# Patient Record
Sex: FEMALE | Race: WHITE | Employment: OTHER | URBAN - METROPOLITAN AREA
[De-identification: names, ages, dates, MRNs, and addresses within clinical notes are randomized per-mention and may not be internally consistent; named-entity substitution may affect disease eponyms.]

---

## 2017-03-26 PROBLEM — M19.011 PRIMARY OSTEOARTHRITIS OF RIGHT SHOULDER: Status: ACTIVE | Noted: 2017-03-26

## 2018-08-22 ENCOUNTER — TELEPHONE (OUTPATIENT)
Dept: ADMINISTRATIVE | Age: 78
End: 2018-08-22

## 2018-09-24 ENCOUNTER — HOSPITAL ENCOUNTER (OUTPATIENT)
Age: 78
Discharge: HOME | End: 2018-09-26
Payer: MEDICARE

## 2018-09-24 ENCOUNTER — OFFICE VISIT (OUTPATIENT)
Dept: FAMILY MEDICINE CLINIC | Age: 78
End: 2018-09-24
Payer: MEDICARE

## 2018-09-24 VITALS
HEIGHT: 66 IN | BODY MASS INDEX: 21.05 KG/M2 | SYSTOLIC BLOOD PRESSURE: 136 MMHG | RESPIRATION RATE: 20 BRPM | WEIGHT: 131 LBS | OXYGEN SATURATION: 99 % | DIASTOLIC BLOOD PRESSURE: 84 MMHG | HEART RATE: 74 BPM

## 2018-09-24 DIAGNOSIS — R20.2 LEFT LEG PARESTHESIAS: ICD-10-CM

## 2018-09-24 DIAGNOSIS — E55.9 VITAMIN D DEFICIENCY: ICD-10-CM

## 2018-09-24 DIAGNOSIS — R20.2 LEFT LEG PARESTHESIAS: Primary | ICD-10-CM

## 2018-09-24 DIAGNOSIS — Z23 NEED FOR IMMUNIZATION AGAINST INFLUENZA: ICD-10-CM

## 2018-09-24 LAB
FOLATE: 8.1 NG/ML (ref 4.8–24.2)
TSH SERPL DL<=0.05 MIU/L-ACNC: 4.24 UIU/ML (ref 0.27–4.2)
VITAMIN B-12: 339 PG/ML (ref 211–946)
VITAMIN D 25-HYDROXY: 46 NG/ML (ref 30–100)

## 2018-09-24 PROCEDURE — G0008 ADMIN INFLUENZA VIRUS VAC: HCPCS | Performed by: FAMILY MEDICINE

## 2018-09-24 PROCEDURE — 1123F ACP DISCUSS/DSCN MKR DOCD: CPT | Performed by: FAMILY MEDICINE

## 2018-09-24 PROCEDURE — 1101F PT FALLS ASSESS-DOCD LE1/YR: CPT | Performed by: FAMILY MEDICINE

## 2018-09-24 PROCEDURE — 82607 VITAMIN B-12: CPT

## 2018-09-24 PROCEDURE — 1036F TOBACCO NON-USER: CPT | Performed by: FAMILY MEDICINE

## 2018-09-24 PROCEDURE — 99213 OFFICE O/P EST LOW 20 MIN: CPT | Performed by: FAMILY MEDICINE

## 2018-09-24 PROCEDURE — 84443 ASSAY THYROID STIM HORMONE: CPT

## 2018-09-24 PROCEDURE — G8420 CALC BMI NORM PARAMETERS: HCPCS | Performed by: FAMILY MEDICINE

## 2018-09-24 PROCEDURE — 82746 ASSAY OF FOLIC ACID SERUM: CPT

## 2018-09-24 PROCEDURE — 4040F PNEUMOC VAC/ADMIN/RCVD: CPT | Performed by: FAMILY MEDICINE

## 2018-09-24 PROCEDURE — G8427 DOCREV CUR MEDS BY ELIG CLIN: HCPCS | Performed by: FAMILY MEDICINE

## 2018-09-24 PROCEDURE — 90662 IIV NO PRSV INCREASED AG IM: CPT | Performed by: FAMILY MEDICINE

## 2018-09-24 PROCEDURE — 82306 VITAMIN D 25 HYDROXY: CPT

## 2018-09-24 PROCEDURE — G8399 PT W/DXA RESULTS DOCUMENT: HCPCS | Performed by: FAMILY MEDICINE

## 2018-09-24 PROCEDURE — 1090F PRES/ABSN URINE INCON ASSESS: CPT | Performed by: FAMILY MEDICINE

## 2018-09-24 RX ORDER — GABAPENTIN 100 MG/1
100 CAPSULE ORAL 3 TIMES DAILY
Qty: 270 CAPSULE | Refills: 1 | Status: SHIPPED | OUTPATIENT
Start: 2018-09-24 | End: 2018-10-02 | Stop reason: SDUPTHER

## 2018-09-24 ASSESSMENT — ENCOUNTER SYMPTOMS
NAUSEA: 0
SHORTNESS OF BREATH: 0
VOMITING: 0
DIARRHEA: 0

## 2018-09-24 ASSESSMENT — PATIENT HEALTH QUESTIONNAIRE - PHQ9
2. FEELING DOWN, DEPRESSED OR HOPELESS: 0
SUM OF ALL RESPONSES TO PHQ QUESTIONS 1-9: 0
1. LITTLE INTEREST OR PLEASURE IN DOING THINGS: 0
SUM OF ALL RESPONSES TO PHQ9 QUESTIONS 1 & 2: 0
SUM OF ALL RESPONSES TO PHQ QUESTIONS 1-9: 0

## 2018-09-24 NOTE — PATIENT INSTRUCTIONS
Patient Education        Neuropathic Pain: Care Instructions  Your Care Instructions    Neuropathic pain is caused by pressure on or damage to your nerves. It's often simply called nerve pain. Some people feel this type of pain all the time. For others, it comes and goes. Diabetes, shingles, or an injury can cause nerve pain. Many people say the pain feels sharp, burning, or stabbing. But some people feel it as a dull ache. In some cases, it makes your skin very sensitive. So touch, pressure, and other sensations that did not hurt before may now cause pain. It's important to know that this kind of pain is real and can affect your quality of life. It's also important to know that treatment can help. Treatment includes pain medicines, exercise, and physical therapy. Medicines can help reduce the number of pain signals that travel over the nerves. This can make the painful areas less sensitive. It can also help you sleep better and improve your mood. But medicines are only one part of successful treatment. Most people do best with more than one kind of treatment. Your doctor may recommend that you try cognitive-behavioral therapy and stress management. Or, if needed, you may decide to try to quit smoking, lower your blood pressure, or better control blood sugar. These kinds of healthy changes can also make a difference. If you feel that your treatment is not working, talk to your doctor. And be sure to tell your doctor if you think you might be depressed or anxious. These are common problems that can also be treated. Follow-up care is a key part of your treatment and safety. Be sure to make and go to all appointments, and call your doctor if you are having problems. It's also a good idea to know your test results and keep a list of the medicines you take. How can you care for yourself at home? · Be safe with medicines. Read and follow all instructions on the label.   ¨ If the doctor gave you a prescription medicine for pain, take it as prescribed. ¨ If you are not taking a prescription pain medicine, ask your doctor if you can take an over-the-counter medicine. · Save hard tasks for days when you have less pain. Follow a hard task with an easy task. And remember to take breaks. · Relax, and reduce stress. You may want to try deep breathing or meditation. These can help. · Keep moving. Gentle, daily exercise can help reduce pain. Your doctor or physical therapist can tell you what type of exercise is best for you. This may include walking, swimming, and stationary biking. It may also include stretches and range-of-motion exercises. · Try heat, cold packs, and massage. · Get enough sleep. Constant pain can make you more tired. If the pain makes it hard to sleep, talk with your doctor. · Think positively. Your thoughts can affect your pain. Do fun things to distract yourself from the pain. See a movie, read a book, listen to music, or spend time with a friend. · Keep a pain diary. Try to write down how strong your pain is and what it feels like. Also try to notice and write down how your moods, thoughts, sleep, activities, and medicine affect your pain. These notes can help you and your doctor find the best ways to treat your pain. Reducing constipation caused by pain medicine  Pain medicines often cause constipation. To reduce constipation:  · Include fruits, vegetables, beans, and whole grains in your diet each day. These foods are high in fiber. · Drink plenty of fluids, enough so that your urine is light yellow or clear like water. If you have kidney, heart, or liver disease and have to limit fluids, talk with your doctor before you increase the amount of fluids you drink. · Get some exercise every day. Build up slowly to 30 to 60 minutes a day on 5 or more days of the week. · Take a fiber supplement, such as Citrucel or Metamucil, every day if needed.  Read and follow all instructions on the

## 2018-09-28 ENCOUNTER — TELEPHONE (OUTPATIENT)
Dept: FAMILY MEDICINE CLINIC | Age: 78
End: 2018-09-28

## 2018-09-28 NOTE — TELEPHONE ENCOUNTER
Patient notified of lab results and change to medication. She was instructed to stop the 50,000 units weekly and to start taking the 100 units daily. Patient had a question regarding the gabapentin prescription that was sent in. She states it was $109 and she felt that was too much for the medication.  Is there anything else you can send in?

## 2018-10-02 DIAGNOSIS — R20.2 LEFT LEG PARESTHESIAS: ICD-10-CM

## 2018-10-03 RX ORDER — GABAPENTIN 100 MG/1
100 CAPSULE ORAL 3 TIMES DAILY
Qty: 90 CAPSULE | Refills: 3 | Status: SHIPPED | OUTPATIENT
Start: 2018-10-03 | End: 2019-02-05 | Stop reason: DRUGHIGH

## 2018-10-04 ENCOUNTER — TELEPHONE (OUTPATIENT)
Dept: FAMILY MEDICINE CLINIC | Age: 78
End: 2018-10-04

## 2018-10-17 ENCOUNTER — OFFICE VISIT (OUTPATIENT)
Dept: PHYSICAL MEDICINE AND REHAB | Age: 78
End: 2018-10-17
Payer: MEDICARE

## 2018-10-17 VITALS — HEIGHT: 64 IN | BODY MASS INDEX: 23.22 KG/M2 | WEIGHT: 136 LBS

## 2018-10-17 DIAGNOSIS — M54.17 RADICULOPATHY, LUMBOSACRAL REGION: Primary | ICD-10-CM

## 2018-10-17 PROCEDURE — 95886 MUSC TEST DONE W/N TEST COMP: CPT | Performed by: PHYSICAL MEDICINE & REHABILITATION

## 2018-10-17 PROCEDURE — 95909 NRV CNDJ TST 5-6 STUDIES: CPT | Performed by: PHYSICAL MEDICINE & REHABILITATION

## 2018-10-17 PROCEDURE — 99202 OFFICE O/P NEW SF 15 MIN: CPT | Performed by: PHYSICAL MEDICINE & REHABILITATION

## 2018-10-17 PROCEDURE — 1090F PRES/ABSN URINE INCON ASSESS: CPT | Performed by: PHYSICAL MEDICINE & REHABILITATION

## 2018-10-17 PROCEDURE — G8427 DOCREV CUR MEDS BY ELIG CLIN: HCPCS | Performed by: PHYSICAL MEDICINE & REHABILITATION

## 2018-10-17 PROCEDURE — 1123F ACP DISCUSS/DSCN MKR DOCD: CPT | Performed by: PHYSICAL MEDICINE & REHABILITATION

## 2018-10-17 PROCEDURE — G8420 CALC BMI NORM PARAMETERS: HCPCS | Performed by: PHYSICAL MEDICINE & REHABILITATION

## 2018-10-17 PROCEDURE — 1101F PT FALLS ASSESS-DOCD LE1/YR: CPT | Performed by: PHYSICAL MEDICINE & REHABILITATION

## 2018-10-17 PROCEDURE — G8399 PT W/DXA RESULTS DOCUMENT: HCPCS | Performed by: PHYSICAL MEDICINE & REHABILITATION

## 2018-10-17 PROCEDURE — G8482 FLU IMMUNIZE ORDER/ADMIN: HCPCS | Performed by: PHYSICAL MEDICINE & REHABILITATION

## 2018-10-17 PROCEDURE — 4040F PNEUMOC VAC/ADMIN/RCVD: CPT | Performed by: PHYSICAL MEDICINE & REHABILITATION

## 2018-10-17 PROCEDURE — 1036F TOBACCO NON-USER: CPT | Performed by: PHYSICAL MEDICINE & REHABILITATION

## 2018-10-17 NOTE — PATIENT INSTRUCTIONS
Electrodiagnotic Laboratory  Accredited by the [facility] with Exemplary status  [facility]  [address] [place]  Phone: [phone]  Fax: [phone]        Today you had an electrodiagnostic exam which included nerve conduction studies (NCS) and electromyography (EMG). This test evaluated the electrical activity of your nerves and muscles to help determine if you have a nerve or muscle disease. This test can help determine the location and type of a nerve or muscle problem. This will help your referring doctor diagnose your condition and determine the appropriate next step in your treatment plan. After your test:    1. There are no long lasting side effects of the test.     2. You may resume your normal activities without restrictions. 3.  Resume any medications that were stopped for the test.     4  If you have sore areas or bruising in your muscles where the needle was placed, apply a cold pack to the sore area for 15-20 minutes three to four times a day as needed for pain. The soreness should go away in about 1-2 days. 5. Your results were provided  Briefly at the end of your test and the final detailed report will be provided to your referring physician, and/or primary care physician and any other parties you requested within 1-2 days of the examination. You may wish to contact your referring provider after a few days to determine what they would like you to do next. 6.  Please call [phone] with any questions or concerns and if you develop increased body temperature/fever, swelling, tenderness, increased pain and/or drainage from the sites where the needle was placed. Thank you for choosing us for your health care needs.

## 2018-10-17 NOTE — PROGRESS NOTES
lumbrical/interossei)  0.4     Combined Sensory Index:   Study Latency Difference (ms)</=   Median to Ulnar Palmar Orthodromic mixed nerve comparison 0.3   Median to Ulnar sensory Ring finger comparison 0.4       Median: Radial sensory digit 1 comparison 0.5     Combined Sensory Index (CSI)  0.9       Motor Nerves Baseline to Peak Amplitude  (mV) Conduction Velocity (m/s) Distal Latency (ms)   Median motor APB  All ages  Men Age23 to 44  Men Age 36 to 52  Men Age 48 to 61  Men Age 61 to 71  Men age 79 to 78  Women Age 23 to 44  Women Age 36 to 52  Women Age 48 to 61  Women Age 61 to 71  Women Age 79 to 78   4.1  5.9  4.2   4.2   3.8  3.8  5.9  4.2  4.2  3.8  3.8   49  49  47  47  47  47  53  51  51  51  51   4.5  4.6  4.6  4.7  4.7  4.7  4.4  4.4  4.4  4.4  4.4   Ulnar motor ADM  All ages  Below elbow  Across elbow  Above elbow  CV drop across elbow  % CV drop across elbow   7.9     52  43  50  15 m/s  23%   3.7   Fibular (peroneal) motor EDB  All ages  Age 23 to 44 <170 cm tall   Age 23 to 44 >170 cm tall  Age 36 to 78 <170 cm tall  Age 36 to 78 >170 cm tall  CV across fibular head  CV drop across fibular head  % CV drop across fibular head  % amplitude drop ankle to below fibula  % amplitude drop across fibular head   1.3  2.6  2.6  1.1  1.1        32%  25%   38  43  37  39  36  42  6m/s  12%     6.5  6.5  6.5  6.5  6.5   Tibial motor AH  All ages  Age 23 to 34 <160 cm tall  Age 30-49 <160 cm tall  Age 48 to 61 <160 cm tall  Age 61 to 78 <160 cm tall  Age 23 to 34, 160-170 cm tall  Age 30-49 160-170 cm tall  Age 48 to 61 160-170 cm tall  Age 61 to 78 160-170 cm tall  Age 23 to 34 >/=170 cm tall  Age 30-49 >/=170 cm tall  Age 48 to 61 >/=170 cm tall  Age 61 to 78 >/=170 cm tall  Amplitude drop from ankle to knee  % amplitude drop ankle to knee   4.4  5.8  5.3  5.3  1.1  5.8  5.3  5.3  1.1  5.8  5.3  5.3  1.1  10.3  71%   39  44  44  40  40  42  42  34  34  37  37  34  34 6.1  6.1  6.1  6.1  6.1  6.1  6.1  6.1  6.1  6.1  6.1  6.1  6.1     Ulnar motor (FDI)  Age <9  Age 7-34  Age 35-46  Age 52-63  Age >57   >8  >8  >7  >7  >7   >51  >51  >50  >50  >50   <3.8  <3.8  <4.3  <4.5  <4.5     Radial motor (EDC)  Age <9  Age 7-34  Age 35-46  Age 52-63  Age >57   >6  >6  >6  >5  >5   >47  >47  >50  >50  >50   <3.0  <3.0  <3.1  <3.1  <3.1   Musculocutaneous motor (Biceps)   Age <9  Age 7-34  Age 35-46  Age 52-63  Age >57   >4  >4  >4  >4  >3    <3.5  <3.5  <3.5  <3.5  <3.8   Axillary motor (Deltoid)  Age <9  Age 7-34  Age 35-46  Age 52-63  Age >57   >4  >4  >4  >4  >3    <4.8  <4.8  <4.8  <4.8  <5     Tibial motor (ADQP)  Age <9  Age 7-34  Age 35-46  Age 52-63  Age >57   >4  >4  >4  >3  >3   >41  >41  >41  >40  >40   <6.0  <6.0  <6.5  <6.5  <6.5   Peroneal motor (TA)  Age <9  Age 7-32>4  Age 35-46  Age 52-63  Age >57   >4  >4  >4  >3  >3   >41  >41  >40  >40  >40   <4  <4  <4  <4.5  <4.5     Femoral motor (RF)  Age <9  Age 7-34  Age 35-46  Age 52-63  Age >57   >4  >4  >4  >3  >3      <6.0  <6.0  <6.5  <6.5  <6.5         Nerve F  Minimal latency (ms)   Median (APB) 32   Ulnar (ADM)  32   Peroneal motor (EDB) <56   Tibial motor (AH) <56     Nerve  H-reflex latency (ms) H reflex- side to side latency  difference (ms)   Tibial  (gatroc-soleus) <34  <1.5       Needle EMG:  Fibrillations, positive sharp waves and fasciculations are graded from none (0) to continuous (4+). The configuration and recruitment pattern of motor unit action potentials under voluntary control are described below.      Cc: Irma Spicer MD

## 2018-10-18 ENCOUNTER — TELEPHONE (OUTPATIENT)
Dept: ADMINISTRATIVE | Age: 78
End: 2018-10-18

## 2018-10-18 NOTE — TELEPHONE ENCOUNTER
Pt's  called to cancel and re schedule pt's Medicare AWV. Pre Service was unable to find an appt  With time frame that pt wanted,  was told office will be contacted, and he will receive a call from office to help schedule an appt. Call was received from [phone].

## 2018-10-18 NOTE — PROGRESS NOTES
Date of Examination: 10/18/18  Patient Name: Omar Varghese  is a 69y.o. year old female who was seen due to complaints of   Chief Complaint   Patient presents with    Leg Pain     Left lower extremity \"burning pain\"     Numbness     Left lower extremity numbness     Extremity Weakness     Left lower extremity weakness    that has been present for several months and started after no injury. Physical Exam: MSK: There is no joint effusion, deformity, instability, swelling, erythema or warmth. AROM is full in the spine and extremities. + SLR left. Neurologic:  No focal sensorimotor deficit. Reflexes 2+ and symmetric. Gait is normal.    Impression:     1. Radiculopathy, lumbosacral region        Plan:   · EMG is indicated to evaluate the above diagnosis. Orders Placed This Encounter   Procedures    FL NEEDLE EMG EA EXTREMTY W/PARASPINL AREA COMPLETE    FL MOTOR &/SENS 5-6 NRV CNDJ PRECONF ELTRODE LIMB     · EMG was done today and showed L5 radiculopathy. The patient was educated about the diagnosis and the prognosis. · Recommend correlation with lumbar MRI to evaluate for cause of radiculopathy  · Advised patient to follow up with referring provider. Thank you for allowing me to participate in the care of your patient.       Sincerely,     Nadine Franklyn

## 2018-10-23 DIAGNOSIS — R20.2 LEFT LEG PARESTHESIAS: ICD-10-CM

## 2018-10-25 ENCOUNTER — OFFICE VISIT (OUTPATIENT)
Dept: FAMILY MEDICINE CLINIC | Age: 78
End: 2018-10-25
Payer: MEDICARE

## 2018-10-25 VITALS
WEIGHT: 133 LBS | HEART RATE: 76 BPM | OXYGEN SATURATION: 95 % | SYSTOLIC BLOOD PRESSURE: 122 MMHG | BODY MASS INDEX: 22.16 KG/M2 | RESPIRATION RATE: 20 BRPM | HEIGHT: 65 IN | DIASTOLIC BLOOD PRESSURE: 64 MMHG

## 2018-10-25 DIAGNOSIS — Z00.00 ROUTINE GENERAL MEDICAL EXAMINATION AT A HEALTH CARE FACILITY: Primary | ICD-10-CM

## 2018-10-25 DIAGNOSIS — Z12.31 ENCOUNTER FOR SCREENING MAMMOGRAM FOR BREAST CANCER: ICD-10-CM

## 2018-10-25 PROCEDURE — G0438 PPPS, INITIAL VISIT: HCPCS | Performed by: FAMILY MEDICINE

## 2018-10-25 PROCEDURE — 4040F PNEUMOC VAC/ADMIN/RCVD: CPT | Performed by: FAMILY MEDICINE

## 2018-10-25 PROCEDURE — G8482 FLU IMMUNIZE ORDER/ADMIN: HCPCS | Performed by: FAMILY MEDICINE

## 2018-10-25 ASSESSMENT — PATIENT HEALTH QUESTIONNAIRE - PHQ9
SUM OF ALL RESPONSES TO PHQ QUESTIONS 1-9: 1
SUM OF ALL RESPONSES TO PHQ QUESTIONS 1-9: 1

## 2018-10-25 NOTE — PROGRESS NOTES
Medicare Annual Wellness Visit  Name: August Puga Date: 10/25/2018   MRN: <[id number]> Sex: Female   Age: 69 y.o. Ethnicity: Non-/Non    : 1940 Race: Mayra Guido is here for Medicare AWV    Screenings for behavioral, psychosocial and functional/safety risks, and cognitive dysfunction are all negative except as indicated below. These results, as well as other patient data from the [address] form, are documented in Flowsheets linked to this Encounter. Allergies   Allergen Reactions    Penicillins Anaphylaxis and Swelling    Dextromethorphan Hives    Factive [Gemifloxacin Mesylate]     Food Hives     shrimp    Mucinex Dm [Dm-Guaifenesin Er] Hives    Other      THORPHAN       Prior to Visit Medications    Medication Sig Taking? Authorizing Provider   gabapentin (NEURONTIN) 100 MG capsule Take 1 capsule by mouth 3 times daily. Yesenia Pappas MD   vitamin D (CHOLECALCIFEROL) 1000 UNIT TABS tablet Take 1,000 Units by mouth daily Yes Historical Provider, MD   diazepam (VALIUM) 5 MG tablet Take 1 tablet by mouth nightly as needed for Sleep Yes Danial Pappas MD   primidone (MYSOLINE) 250 MG tablet Take 250 mg by mouth 3 times daily. Yes Historical Provider, MD   lamotrigine (LAMICTAL) 100 MG tablet Take 100 mg by mouth 3 times daily. Yes Historical Provider, MD       Past Medical History:   Diagnosis Date    Diverticulosis     Hemorrhoids, internal     Irritable bowel syndrome     Osteoarthritis     rt knee    Osteoporosis     Seizures ([address]     complex partial  last seizure ?     Urticaria, chronic      Past Surgical History:   Procedure Laterality Date    APPENDECTOMY      CARPAL TUNNEL RELEASE Left     CHOLECYSTECTOMY      COLONOSCOPY      FINGER TRIGGER RELEASE Left     HERNIA REPAIR Right 10/31/2014    Laparoscopic right inguinal hernia repair with mesh     HYSTERECTOMY      vaginal    KNEE ARTHROSCOPY right    TONSILLECTOMY AND ADENOIDECTOMY         Family History   Problem Relation Age of Onset    Coronary Art Dis Mother     Coronary Art Dis Father     Coronary Art Dis Brother     Cancer Brother         lymph nodes and lung    Diabetes Maternal Aunt        CareTeam (Including outside providers/suppliers regularly involved in providing care):   Patient Care Team:  Maria Isabel Gee MD as PCP - General  Maria Isabel Gee MD as PCP - S Attributed Provider  Karmen Mclaughlin MD as Referring Physician  Veronica Navarro MD as Consulting Physician (Neurology)  Ayesha Dakin, MD as Consulting Physician (Cardiology)    Wt Readings from Last 3 Encounters:   10/25/18 133 lb (60.3 kg)   10/17/18 136 lb (61.7 kg)   09/24/18 131 lb (59.4 kg)     Vitals:    10/25/18 1445   BP: 122/64   Pulse: 76   Resp: 20   SpO2: 95%   Weight: 133 lb (60.3 kg)   Height: 5' 5\" (1.651 m)     Body mass index is 22.13 kg/m². Physical Exam   Constitutional: She is oriented to person, place, and time. She appears well-developed and well-nourished. Cardiovascular: Normal rate, regular rhythm and normal heart sounds. Exam reveals no friction rub. No murmur heard. Pulmonary/Chest: Effort normal and breath sounds normal. No respiratory distress. She has no wheezes. She has no rales. Abdominal: Soft. Bowel sounds are normal. She exhibits no distension. There is no tenderness. There is no rebound and no guarding. Neurological: She is alert and oriented to person, place, and time. Skin: Skin is warm and dry. Vitals reviewed. Patient's complete Health Risk Assessment and screening values have been reviewed and are found in Flowsheets. The following problems were reviewed today and where indicated follow up appointments were made and/or referrals ordered.     Positive Risk Factor Screenings with Interventions:     General Health:  General  In the past 7 days, have you experienced any of the

## 2018-10-25 NOTE — PATIENT INSTRUCTIONS
cholesterol, this type of cholesterol actually carries cholesterol away from your arteries and may, therefore, help lower your risk of having a heart attack. You want this level to be high (ideally greater than 60). It is a risk to have a level less than 40. You can raise this good cholesterol by eating olive oil, canola oil, avocados, or nuts. Exercise raises this level, too. Fat    Fat is calorie dense and packs a lot of calories into a small amount of food. Even though fats should be limited due to their high calorie content, not all fats are bad. In fact, some fats are quite healthful. Fat can be broken down into four main types. The good-for-you fats are:   Monounsaturated fat  found in oils such as olive and canola, avocados, and nuts and natural nut butters; can decrease cholesterol levels, while keeping levels of HDL cholesterol high   Polyunsaturated fat  found in oils such as safflower, sunflower, soybean, corn, and sesame; can decrease total cholesterol and LDL cholesterol   Omega-3 fatty acids  particularly those found in fatty fish (such as salmon, trout, tuna, mackerel, herring, and sardines); can decrease risk of arrhythmias, decrease triglyceride levels, and slightly lower blood pressure   The fats that you want to limit are:   Saturated fat  found in animal products, many fast foods, and a few vegetables; increases total blood cholesterol, including LDL levels   Animal fats that are saturated include: butter, lard, whole-milk dairy products, meat fat, and poultry skin   Vegetable fats that are saturated include: hydrogenated shortening, palm oil, coconut oil, cocoa butter   Hydrogenated or trans fat  found in margarine and vegetable shortening, most shelf stable snack foods, and fried foods; increases LDL and decreases HDL     It is generally recommended that you limit your total fat for the day to less than 30% of your total calories.  If you follow an 1800-calorie heart healthy diet, for memory aids can help:   Calendars and day planners   Electronic organizers to store all sorts of helpful informationThese devices can \"beep\" to remind you of appointments. A book of days to record birthdays, anniversaries, and other occasions that occur on the same date every year   Detailed \"to-do\" lists and strategically placed sticky notes   Quick \"study\" sessionsBefore a gathering, review who will be there so their names will be fresh in your mind. Establish routinesFor example, keep your keys, wallet, and umbrella in the same place all the time or take medicine with your 8:00 AM glass of juice   Live a Healthy Life   Many actions that will keep your body strong will do the same for your mind. For example:   Talk to Your Doctor About Herbs and Supplements    Malnutrition and vitamin deficiencies can impair your mental function. For example, vitamin B12 deficiency can cause a range of symptoms, including confusion. But, what if your nutritional needs are being met? Can herbs and supplements still offer a benefit? Researchers have investigated a range of natural remedies, such as ginkgo , ginseng , and the supplement phosphatidylserine (PS). So far, though, the evidence is inconsistent as to whether these products can improve memory or thinking. If you are interested in taking herbs and supplements, talk to your doctor first because they may interact with other medicines that you are taking. Exercise Regularly    Among the many benefits of regular exercise are increased blood flow to the brain and decreased risk of certain diseases that can interfere with memory function. One study found that even moderate exercise has a beneficial effect. Examples of \"moderate\" exercise include:   Playing 18 holes of golf once a week, without a cart   Playing tennis twice a week   Walking one mile per day   Manage Stress    It can be tough to remember what is important when your mind is cluttered.  Make time for

## 2018-11-19 ENCOUNTER — HOSPITAL ENCOUNTER (OUTPATIENT)
Dept: MAMMOGRAPHY | Age: 78
Discharge: HOME | End: 2018-11-21
Payer: MEDICARE

## 2018-11-19 DIAGNOSIS — Z12.31 ENCOUNTER FOR SCREENING MAMMOGRAM FOR BREAST CANCER: ICD-10-CM

## 2018-11-19 PROCEDURE — 77067 SCR MAMMO BI INCL CAD: CPT

## 2019-01-24 ENCOUNTER — OFFICE VISIT (OUTPATIENT)
Dept: FAMILY MEDICINE CLINIC | Age: 79
End: 2019-01-24
Payer: MEDICARE

## 2019-01-24 VITALS
WEIGHT: 130 LBS | DIASTOLIC BLOOD PRESSURE: 80 MMHG | OXYGEN SATURATION: 98 % | HEIGHT: 66 IN | HEART RATE: 76 BPM | SYSTOLIC BLOOD PRESSURE: 122 MMHG | BODY MASS INDEX: 20.89 KG/M2 | RESPIRATION RATE: 20 BRPM

## 2019-01-24 DIAGNOSIS — M54.16 LUMBAR RADICULOPATHY: Primary | ICD-10-CM

## 2019-01-24 PROCEDURE — 99214 OFFICE O/P EST MOD 30 MIN: CPT | Performed by: FAMILY MEDICINE

## 2019-01-24 PROCEDURE — 1101F PT FALLS ASSESS-DOCD LE1/YR: CPT | Performed by: FAMILY MEDICINE

## 2019-01-24 PROCEDURE — G8427 DOCREV CUR MEDS BY ELIG CLIN: HCPCS | Performed by: FAMILY MEDICINE

## 2019-01-24 PROCEDURE — G8482 FLU IMMUNIZE ORDER/ADMIN: HCPCS | Performed by: FAMILY MEDICINE

## 2019-01-24 PROCEDURE — G8420 CALC BMI NORM PARAMETERS: HCPCS | Performed by: FAMILY MEDICINE

## 2019-01-24 PROCEDURE — G8399 PT W/DXA RESULTS DOCUMENT: HCPCS | Performed by: FAMILY MEDICINE

## 2019-01-24 PROCEDURE — 1123F ACP DISCUSS/DSCN MKR DOCD: CPT | Performed by: FAMILY MEDICINE

## 2019-01-24 PROCEDURE — 1090F PRES/ABSN URINE INCON ASSESS: CPT | Performed by: FAMILY MEDICINE

## 2019-01-24 PROCEDURE — 1036F TOBACCO NON-USER: CPT | Performed by: FAMILY MEDICINE

## 2019-01-24 PROCEDURE — 4040F PNEUMOC VAC/ADMIN/RCVD: CPT | Performed by: FAMILY MEDICINE

## 2019-02-05 ENCOUNTER — OFFICE VISIT (OUTPATIENT)
Dept: PHYSICAL MEDICINE AND REHAB | Age: 79
End: 2019-02-05
Payer: MEDICARE

## 2019-02-05 VITALS
HEIGHT: 64 IN | SYSTOLIC BLOOD PRESSURE: 142 MMHG | BODY MASS INDEX: 21.85 KG/M2 | HEART RATE: 86 BPM | DIASTOLIC BLOOD PRESSURE: 76 MMHG | WEIGHT: 128 LBS

## 2019-02-05 DIAGNOSIS — M54.16 LUMBAR RADICULOPATHY: Primary | ICD-10-CM

## 2019-02-05 DIAGNOSIS — M79.2 NEUROPATHIC PAIN: ICD-10-CM

## 2019-02-05 PROCEDURE — G8399 PT W/DXA RESULTS DOCUMENT: HCPCS | Performed by: PHYSICAL MEDICINE & REHABILITATION

## 2019-02-05 PROCEDURE — 4040F PNEUMOC VAC/ADMIN/RCVD: CPT | Performed by: PHYSICAL MEDICINE & REHABILITATION

## 2019-02-05 PROCEDURE — 1101F PT FALLS ASSESS-DOCD LE1/YR: CPT | Performed by: PHYSICAL MEDICINE & REHABILITATION

## 2019-02-05 PROCEDURE — 1123F ACP DISCUSS/DSCN MKR DOCD: CPT | Performed by: PHYSICAL MEDICINE & REHABILITATION

## 2019-02-05 PROCEDURE — 1036F TOBACCO NON-USER: CPT | Performed by: PHYSICAL MEDICINE & REHABILITATION

## 2019-02-05 PROCEDURE — G8427 DOCREV CUR MEDS BY ELIG CLIN: HCPCS | Performed by: PHYSICAL MEDICINE & REHABILITATION

## 2019-02-05 PROCEDURE — G8420 CALC BMI NORM PARAMETERS: HCPCS | Performed by: PHYSICAL MEDICINE & REHABILITATION

## 2019-02-05 PROCEDURE — 1090F PRES/ABSN URINE INCON ASSESS: CPT | Performed by: PHYSICAL MEDICINE & REHABILITATION

## 2019-02-05 PROCEDURE — G8482 FLU IMMUNIZE ORDER/ADMIN: HCPCS | Performed by: PHYSICAL MEDICINE & REHABILITATION

## 2019-02-05 PROCEDURE — 99214 OFFICE O/P EST MOD 30 MIN: CPT | Performed by: PHYSICAL MEDICINE & REHABILITATION

## 2019-02-05 RX ORDER — PREGABALIN 75 MG/1
75 CAPSULE ORAL 2 TIMES DAILY
Qty: 60 CAPSULE | Refills: 2 | Status: SHIPPED | OUTPATIENT
Start: 2019-02-05 | End: 2019-02-27 | Stop reason: SINTOL

## 2019-02-14 ENCOUNTER — HOSPITAL ENCOUNTER (OUTPATIENT)
Dept: MRI IMAGING | Age: 79
Discharge: HOME | End: 2019-02-16
Payer: MEDICARE

## 2019-02-14 DIAGNOSIS — M54.16 LUMBAR RADICULOPATHY: ICD-10-CM

## 2019-02-14 PROCEDURE — 72148 MRI LUMBAR SPINE W/O DYE: CPT

## 2019-02-18 ENCOUNTER — TELEPHONE (OUTPATIENT)
Dept: PHYSICAL MEDICINE AND REHAB | Age: 79
End: 2019-02-18

## 2019-02-27 ENCOUNTER — OFFICE VISIT (OUTPATIENT)
Dept: PHYSICAL MEDICINE AND REHAB | Age: 79
End: 2019-02-27
Payer: MEDICARE

## 2019-02-27 VITALS
BODY MASS INDEX: 21.83 KG/M2 | HEART RATE: 68 BPM | WEIGHT: 131 LBS | HEIGHT: 65 IN | DIASTOLIC BLOOD PRESSURE: 76 MMHG | SYSTOLIC BLOOD PRESSURE: 142 MMHG

## 2019-02-27 DIAGNOSIS — G89.29 CHRONIC PAIN OF LEFT ANKLE: ICD-10-CM

## 2019-02-27 DIAGNOSIS — M25.572 CHRONIC PAIN OF LEFT ANKLE: ICD-10-CM

## 2019-02-27 DIAGNOSIS — M79.605 LEFT LEG PAIN: Primary | ICD-10-CM

## 2019-02-27 PROCEDURE — 1090F PRES/ABSN URINE INCON ASSESS: CPT | Performed by: PHYSICAL MEDICINE & REHABILITATION

## 2019-02-27 PROCEDURE — 99214 OFFICE O/P EST MOD 30 MIN: CPT | Performed by: PHYSICAL MEDICINE & REHABILITATION

## 2019-02-27 PROCEDURE — G8427 DOCREV CUR MEDS BY ELIG CLIN: HCPCS | Performed by: PHYSICAL MEDICINE & REHABILITATION

## 2019-02-27 PROCEDURE — G8482 FLU IMMUNIZE ORDER/ADMIN: HCPCS | Performed by: PHYSICAL MEDICINE & REHABILITATION

## 2019-02-27 PROCEDURE — 1101F PT FALLS ASSESS-DOCD LE1/YR: CPT | Performed by: PHYSICAL MEDICINE & REHABILITATION

## 2019-02-27 PROCEDURE — 1123F ACP DISCUSS/DSCN MKR DOCD: CPT | Performed by: PHYSICAL MEDICINE & REHABILITATION

## 2019-02-27 PROCEDURE — G8420 CALC BMI NORM PARAMETERS: HCPCS | Performed by: PHYSICAL MEDICINE & REHABILITATION

## 2019-02-27 PROCEDURE — 4040F PNEUMOC VAC/ADMIN/RCVD: CPT | Performed by: PHYSICAL MEDICINE & REHABILITATION

## 2019-02-27 PROCEDURE — G8399 PT W/DXA RESULTS DOCUMENT: HCPCS | Performed by: PHYSICAL MEDICINE & REHABILITATION

## 2019-02-27 PROCEDURE — 1036F TOBACCO NON-USER: CPT | Performed by: PHYSICAL MEDICINE & REHABILITATION

## 2019-03-26 ENCOUNTER — OFFICE VISIT (OUTPATIENT)
Dept: FAMILY MEDICINE CLINIC | Age: 79
End: 2019-03-26
Payer: MEDICARE

## 2019-03-26 VITALS
HEART RATE: 78 BPM | DIASTOLIC BLOOD PRESSURE: 72 MMHG | WEIGHT: 129 LBS | HEIGHT: 64 IN | SYSTOLIC BLOOD PRESSURE: 124 MMHG | OXYGEN SATURATION: 98 % | RESPIRATION RATE: 20 BRPM | BODY MASS INDEX: 22.02 KG/M2

## 2019-03-26 DIAGNOSIS — F51.01 PRIMARY INSOMNIA: ICD-10-CM

## 2019-03-26 DIAGNOSIS — M54.16 LUMBAR RADICULOPATHY: ICD-10-CM

## 2019-03-26 DIAGNOSIS — R00.2 PALPITATIONS: Primary | ICD-10-CM

## 2019-03-26 PROCEDURE — G8420 CALC BMI NORM PARAMETERS: HCPCS | Performed by: FAMILY MEDICINE

## 2019-03-26 PROCEDURE — G8482 FLU IMMUNIZE ORDER/ADMIN: HCPCS | Performed by: FAMILY MEDICINE

## 2019-03-26 PROCEDURE — G8427 DOCREV CUR MEDS BY ELIG CLIN: HCPCS | Performed by: FAMILY MEDICINE

## 2019-03-26 PROCEDURE — 1036F TOBACCO NON-USER: CPT | Performed by: FAMILY MEDICINE

## 2019-03-26 PROCEDURE — 99214 OFFICE O/P EST MOD 30 MIN: CPT | Performed by: FAMILY MEDICINE

## 2019-03-26 PROCEDURE — G8399 PT W/DXA RESULTS DOCUMENT: HCPCS | Performed by: FAMILY MEDICINE

## 2019-03-26 PROCEDURE — 4040F PNEUMOC VAC/ADMIN/RCVD: CPT | Performed by: FAMILY MEDICINE

## 2019-03-26 PROCEDURE — 1123F ACP DISCUSS/DSCN MKR DOCD: CPT | Performed by: FAMILY MEDICINE

## 2019-03-26 PROCEDURE — 93000 ELECTROCARDIOGRAM COMPLETE: CPT | Performed by: FAMILY MEDICINE

## 2019-03-26 PROCEDURE — 1090F PRES/ABSN URINE INCON ASSESS: CPT | Performed by: FAMILY MEDICINE

## 2019-03-26 RX ORDER — TRAZODONE HYDROCHLORIDE 50 MG/1
50 TABLET ORAL NIGHTLY
Qty: 30 TABLET | Refills: 3 | Status: SHIPPED | OUTPATIENT
Start: 2019-03-26 | End: 2019-05-06 | Stop reason: SINTOL

## 2019-03-26 ASSESSMENT — PATIENT HEALTH QUESTIONNAIRE - PHQ9
SUM OF ALL RESPONSES TO PHQ QUESTIONS 1-9: 0
2. FEELING DOWN, DEPRESSED OR HOPELESS: 0
1. LITTLE INTEREST OR PLEASURE IN DOING THINGS: 0
SUM OF ALL RESPONSES TO PHQ9 QUESTIONS 1 & 2: 0
SUM OF ALL RESPONSES TO PHQ QUESTIONS 1-9: 0

## 2019-03-26 ASSESSMENT — ENCOUNTER SYMPTOMS
NAUSEA: 0
VOMITING: 0
ABDOMINAL PAIN: 1
BACK PAIN: 1
WHEEZING: 0
CONSTIPATION: 0
DIARRHEA: 0
SHORTNESS OF BREATH: 0
COUGH: 0

## 2019-04-15 ENCOUNTER — OFFICE VISIT (OUTPATIENT)
Dept: PHYSICAL MEDICINE AND REHAB | Age: 79
End: 2019-04-15
Payer: MEDICARE

## 2019-04-15 VITALS
SYSTOLIC BLOOD PRESSURE: 154 MMHG | HEIGHT: 64 IN | WEIGHT: 130 LBS | HEART RATE: 77 BPM | DIASTOLIC BLOOD PRESSURE: 76 MMHG | BODY MASS INDEX: 22.2 KG/M2

## 2019-04-15 DIAGNOSIS — M79.605 LEFT LEG PAIN: ICD-10-CM

## 2019-04-15 DIAGNOSIS — M19.072 ARTHRITIS OF LEFT ANKLE: Primary | ICD-10-CM

## 2019-04-15 PROCEDURE — 99214 OFFICE O/P EST MOD 30 MIN: CPT | Performed by: PHYSICAL MEDICINE & REHABILITATION

## 2019-04-15 PROCEDURE — G8420 CALC BMI NORM PARAMETERS: HCPCS | Performed by: PHYSICAL MEDICINE & REHABILITATION

## 2019-04-15 PROCEDURE — 1090F PRES/ABSN URINE INCON ASSESS: CPT | Performed by: PHYSICAL MEDICINE & REHABILITATION

## 2019-04-15 PROCEDURE — 4040F PNEUMOC VAC/ADMIN/RCVD: CPT | Performed by: PHYSICAL MEDICINE & REHABILITATION

## 2019-04-15 PROCEDURE — 1123F ACP DISCUSS/DSCN MKR DOCD: CPT | Performed by: PHYSICAL MEDICINE & REHABILITATION

## 2019-04-15 PROCEDURE — G8427 DOCREV CUR MEDS BY ELIG CLIN: HCPCS | Performed by: PHYSICAL MEDICINE & REHABILITATION

## 2019-04-15 PROCEDURE — G8399 PT W/DXA RESULTS DOCUMENT: HCPCS | Performed by: PHYSICAL MEDICINE & REHABILITATION

## 2019-04-15 PROCEDURE — 1036F TOBACCO NON-USER: CPT | Performed by: PHYSICAL MEDICINE & REHABILITATION

## 2019-04-15 PROCEDURE — 20605 DRAIN/INJ JOINT/BURSA W/O US: CPT | Performed by: PHYSICAL MEDICINE & REHABILITATION

## 2019-04-15 RX ORDER — LIDOCAINE HYDROCHLORIDE 10 MG/ML
2 INJECTION, SOLUTION INFILTRATION; PERINEURAL ONCE
Status: COMPLETED | OUTPATIENT
Start: 2019-04-15 | End: 2019-04-15

## 2019-04-15 RX ORDER — TRIAMCINOLONE ACETONIDE 40 MG/ML
40 INJECTION, SUSPENSION INTRA-ARTICULAR; INTRAMUSCULAR ONCE
Status: COMPLETED | OUTPATIENT
Start: 2019-04-15 | End: 2019-04-15

## 2019-04-15 RX ADMIN — LIDOCAINE HYDROCHLORIDE 2 ML: 10 INJECTION, SOLUTION INFILTRATION; PERINEURAL at 11:35

## 2019-04-15 RX ADMIN — TRIAMCINOLONE ACETONIDE 40 MG: 40 INJECTION, SUSPENSION INTRA-ARTICULAR; INTRAMUSCULAR at 11:36

## 2019-04-15 NOTE — PROGRESS NOTES
lamotrigine (LAMICTAL) 100 MG tablet Take 100 mg by mouth 3 times daily. No current facility-administered medications for this visit. Past Medical History:   Diagnosis Date    Diverticulosis     Hemorrhoids, internal     Irritable bowel syndrome     Osteoarthritis     rt knee    Osteoporosis     Seizures ([address]     complex partial  last seizure 2008?  Urticaria, chronic        Past Surgical History:   Procedure Laterality Date    APPENDECTOMY      CARPAL TUNNEL RELEASE Left     CHOLECYSTECTOMY      COLONOSCOPY      FINGER TRIGGER RELEASE Left     HERNIA REPAIR Right 10/31/2014    Laparoscopic right inguinal hernia repair with mesh     HYSTERECTOMY      vaginal    KNEE ARTHROSCOPY      right    TONSILLECTOMY AND ADENOIDECTOMY         Family History   Problem Relation Age of Onset    Coronary Art Dis Mother     Coronary Art Dis Father     Coronary Art Dis Brother     Cancer Brother         lymph nodes and lung    Diabetes Maternal Aunt      Social History     Tobacco Use    Smoking status: Never Smoker    Smokeless tobacco: Never Used   Substance Use Topics    Alcohol use: No     Comment: rarely    Drug use: No       Functional Status: The patient is able to ambulate and perform activities of daily living without the use of an assistive device. ROS:    Constitutional: Denies fevers, chills, night sweats, unintentional weight loss     Skin: Denies rash or skin changes     Eyes: Denies vision changes    Ears/Nose/Throat: Denies nasal congestion or sore throat     Respiratory: Denies SOB or cough     Cardiovascular: Denies CP, palpitations, edema      Gastrointestinal: Denies abdominal pain,  N/V, constipation, or diarrhea    Genitourinary: Denies urinary symptoms    Neurologic: See HPI.     MSK: See HPI.      Psychiatric: + sleep disturbance, anxiety, depression    Hematologic/Lymphatic/Immunologic: Easy bruising       Physical Exam:   Blood pressure (!) 154/76, pulse 77, height 5' 4\" (1.626 m), weight 130 lb (59 kg), not currently breastfeeding. General: well developed and well nourished in no acute distress. Body habitus is thin  HEENT: No rhinorrhea, sneezing, yawning, or lacrimation. No scleral icterus or conjunctival injection. Resp: symmetrical chest expansion, unlabored breathing, respirations unlabored. CV: Heart rate is regular. Peripheral pulses are palpable  Lymph: No visible regional lymphadenopathy. Skin: No rashes or ecchymosis. Normal turgor. Psych: Mood is calm. Affect is normal.   Ext: No edema noted     MSK:   Back/Hip Exam:   Inspection: Pelvis was asymmetric. Lumbar lordotic curvature was decreased. There was no scoliosis. No ecchymoses or erythema. Palpation: Palpatory exam revealed no tenderness along lumbosacral paraspinals, midline spine, SI joint sulcus, greater trochanters and TFL on both side. There was no paraspinal spasms. There were no trigger points. ROM: ROM normal.   Special/provocative testing:   Supine SLR negative     Ankle exam:   No swelling around the medial and lateral malleoli or achilles tendon. Tenderness to palpation over distal tibia and medial malleolus. Tenderness to palpation ankle joint. No pain to palpation over the ATFL, CFL, PTFL, 5th metatarsal base. No pain to palpation over the achilles tendon and retroachilles bursa. ROM normal. Pain with inversion. Negative drawer test.  Negative talar tilt. Negative squeeze test.    Neurological Exam:  Strength:   R  L  Hip Flex  5  5  Knee Ext  5  5  Ankle dorsi  5  5  EHL   5 5  Ankle Plantar  5  5    Sensory:  Decreased sensation LT L4 dermatome on left     Reflexes:   R  L  Patellar  (1+) (1+)  Ankle Jerk  (1+) (1+)      Gait is Antalgic. Imaging: (personally reviewed by me 04/15/19)  EMG- left L5 radiculopathy     MRI L spine      Intact lumbar vertebral height and alignment without acute osseous   signal and mallet.  Multilevel disc space narrowing with degenerative   intrinsic signal and reactive endplate changes. Conus medullaris T12. Nerve roots normally distributed within the thecal sac.       T11-12, broad-based disc bulge and facet arthritis with mild central   spinal canal stenosis. Mild bilateral neural foraminal stenosis.       T12-L1, no disc herniation or significant central spinal canal or   foraminal stenosis.       L1-2, disc bulge and mild facet arthritis with borderline central   spinal canal stenosis. Moderate right and mild left neural foraminal   stenosis.       L2-3, broad-based disc protrusion, facet arthritis and mild   ligamentous hypertrophy with moderate central spinal canal stenosis. Mild right and moderate left neural foraminal stenosis.       L3-4, disc protrusion, facet arthritis and ligamentous hypertrophy   with moderate central spinal canal stenosis and mild bilateral neural   foraminal stenosis.       L4-5, small disc bulge. Mild facet arthritis. No significant central   spinal canal stenosis. Mild bilateral neural foraminal stenosis.       L5-S1, very small disc bulge. Mild facet arthritis. Mild bilateral   neural foraminal stenosis. No significant central spinal canal   stenosis.         Impression   Central spinal canal stenosis is most severe at L1-2 and   L2-3 as discussed. Impression:   Miguelina Vazquez is a 67 y.o. female    1. Arthritis of left ankle    2. Left leg pain        Plan:   · MRI and x-ray ankle reviewed. · Inject left ankle today for diagnostic and therapeutic purposes. Discussed need for diagnostic epidural depending on response to ankle injection. · Continue PT. New referral placed. The patient was educated about the diagnosis, prognosis, indications, risks and benefits of treatment. An opportunity to ask questions was given to the patient and questions were answered. The patient agreed to proceed with the recommended treatment as described above.      Follow up in 4- weeks     Jr Joe Lorena, , [address] [place] [address]   Board Certified Physical Medicine and Rehabilitation        After explaining the indications, risks, benefits and alternatives of a left tibiotalar (ankle) joint injection, the patient agreed to proceed. A permit was signed and scanned into the media. The patient was placed in the supine position. The skin was prepared with chloraprep. Using an aseptic, no touch technique, a 22 gauge, 1.5\" needle with 1 mL of Kenalog 40mg/mL and 2 mL of Xylocaine 1% was directed to the tibiotalar joint using ultrasound guidance. After negative aspiration the medication was injected. Adequate hemostasis was achieved and a bandage was placed over the injection site. The patient was monitored clinically after the injection and left the clinic without incident. The patient tolerated the procedure well and was educated in post injection care. There was post-injection reduction in pain. Images were scanned into the EMR separately.

## 2019-04-15 NOTE — PATIENT INSTRUCTIONS
We appreciate that you chose [facility] to provide your healthcare needs today. We took great pleasure in caring for you. You may receive a survey to help us learn how to make your next visit to a [facility] facility better than the last.   Your feedback is important to help us continually improve the service we can provide you. Please take the time to complete it thoughtfully if you receive one as we value the feedback in every survey. In this survey we would appreciate that you answer \"always\" or \"yes\" for as many questions as possible (this is the answer we get credit for doing a good job). If you feel there is a question you cannot answer \"always\" or \"yes\", please let us know before you leave today so we can remedy the situation right away. We look forward to continuing to provide you with excellent care. Considering the survey questions related to access to care, please keep in mind the urgency of your problem (i.e. was it an emergent or urgent visit or more routine)  and whether the urgency of that problem was handled appropriately by our office. We always do our best to prioritize the patients who need the care most urgently given our specialty is in short supply and there is a high demand for visits. Thank you for your time and consideration.

## 2019-04-28 ENCOUNTER — HOSPITAL ENCOUNTER (EMERGENCY)
Age: 79
Discharge: HOME | End: 2019-04-28
Payer: MEDICARE

## 2019-04-28 VITALS
BODY MASS INDEX: 22.31 KG/M2 | DIASTOLIC BLOOD PRESSURE: 82 MMHG | OXYGEN SATURATION: 97 % | HEART RATE: 97 BPM | SYSTOLIC BLOOD PRESSURE: 152 MMHG | RESPIRATION RATE: 16 BRPM | WEIGHT: 130 LBS | TEMPERATURE: 98.1 F

## 2019-04-28 DIAGNOSIS — R19.7 DIARRHEA, UNSPECIFIED TYPE: Primary | ICD-10-CM

## 2019-04-28 LAB
BASOPHILS ABSOLUTE: 0 E9/L (ref 0–0.2)
BASOPHILS RELATIVE PERCENT: 0 % (ref 0–2)
CO2: 27 MMOL/L (ref 22–29)
EOSINOPHILS ABSOLUTE: 0.08 E9/L (ref 0.05–0.5)
EOSINOPHILS RELATIVE PERCENT: 1.6 % (ref 0–6)
GFR AFRICAN AMERICAN: >60
GFR NON-AFRICAN AMERICAN: >60 ML/MIN/1.73
GLUCOSE BLD-MCNC: 91 MG/DL (ref 74–99)
HCT VFR BLD CALC: 45.5 % (ref 34–48)
HEMOGLOBIN: 15.4 G/DL (ref 11.5–15.5)
IMMATURE GRANULOCYTES #: 0.02 E9/L
IMMATURE GRANULOCYTES %: 0.4 % (ref 0–5)
LYMPHOCYTES ABSOLUTE: 0.79 E9/L (ref 1.5–4)
LYMPHOCYTES RELATIVE PERCENT: 15.6 % (ref 20–42)
MCH RBC QN AUTO: 30.4 PG (ref 26–35)
MCHC RBC AUTO-ENTMCNC: 33.8 % (ref 32–34.5)
MCV RBC AUTO: 89.7 FL (ref 80–99.9)
MONOCYTES ABSOLUTE: 0.36 E9/L (ref 0.1–0.95)
MONOCYTES RELATIVE PERCENT: 7.1 % (ref 2–12)
NEUTROPHILS ABSOLUTE: 3.81 E9/L (ref 1.8–7.3)
NEUTROPHILS RELATIVE PERCENT: 75.3 % (ref 43–80)
PDW BLD-RTO: 13 FL (ref 11.5–15)
PLATELET # BLD: 146 E9/L (ref 130–450)
PMV BLD AUTO: 9.3 FL (ref 7–12)
POC ANION GAP: 7 MMOL/L (ref 7–16)
POC BUN: 9 MG/DL (ref 8–23)
POC CHLORIDE: 99 MMOL/L (ref 100–108)
POC CREATININE: 0.8 MG/DL (ref 0.5–1)
POC POTASSIUM: 4.4 MMOL/L (ref 3.5–5)
POC SODIUM: 133 MMOL/L (ref 132–146)
RBC # BLD: 5.07 E12/L (ref 3.5–5.5)
WBC # BLD: 5.1 E9/L (ref 4.5–11.5)

## 2019-04-28 PROCEDURE — 85025 COMPLETE CBC W/AUTO DIFF WBC: CPT

## 2019-04-28 PROCEDURE — 82565 ASSAY OF CREATININE: CPT

## 2019-04-28 PROCEDURE — 36415 COLL VENOUS BLD VENIPUNCTURE: CPT

## 2019-04-28 PROCEDURE — 99212 OFFICE O/P EST SF 10 MIN: CPT

## 2019-04-28 PROCEDURE — 80051 ELECTROLYTE PANEL: CPT

## 2019-04-28 PROCEDURE — 82947 ASSAY GLUCOSE BLOOD QUANT: CPT

## 2019-04-28 PROCEDURE — 84520 ASSAY OF UREA NITROGEN: CPT

## 2019-04-28 RX ORDER — DIPHENOXYLATE HYDROCHLORIDE AND ATROPINE SULFATE 2.5; .025 MG/1; MG/1
1 TABLET ORAL 4 TIMES DAILY PRN
Qty: 10 TABLET | Refills: 0 | Status: SHIPPED | OUTPATIENT
Start: 2019-04-28 | End: 2019-04-29 | Stop reason: SDUPTHER

## 2019-04-28 NOTE — ED PROVIDER NOTES
Department of Emergency Medicine  [address] [place] [address]  Provider Note  Admit Date/Time: 4/28/2019  9:54 AM  Room: 07/07  MRN: [id number]  Chief Complaint: Other (states she thinks she is dehydrated, she has had diarrhea since yesterday)       History of Present Illness   Source of history provided by:  patient. History/Exam Limitations: none. Roderick Alvarenga is a 67 y.o. female who has a past medical history of:   Past Medical History:   Diagnosis Date    Diverticulosis     Hemorrhoids, internal     Irritable bowel syndrome     Osteoarthritis     rt knee    Osteoporosis     Seizures ([address]     complex partial  last seizure 2008?  Urticaria, chronic     presents to the urgent care center by private car for diarrhea that started yesterday. She states that she has been drinking water, gatoraid,  and she's been eating cheese and bananas trying to \"bind ind herself up \"she said she does not have any abdominal pain or cramping. States she is not weak. Her 's concerned that she is getting dehydrated she has tried some Imodium but it hasn't really stopped her symptoms. She said she hasn't been around anyone with C. diff and she has not been on any recent antibiotics. She does not have a fever. No one around her has similar symptoms. ROS    Pertinent positives and negatives are stated within HPI, all other systems reviewed and are negative. Past Surgical History:   Procedure Laterality Date    APPENDECTOMY      CARPAL TUNNEL RELEASE Left     CHOLECYSTECTOMY      COLONOSCOPY      FINGER TRIGGER RELEASE Left     HERNIA REPAIR Right 10/31/2014    Laparoscopic right inguinal hernia repair with mesh     HYSTERECTOMY      vaginal    KNEE ARTHROSCOPY      right    TONSILLECTOMY AND ADENOIDECTOMY     Social History:  reports that she has never smoked. She has never used smokeless tobacco. She reports that she does not drink alcohol or use drugs.   Family History: family history includes Cancer in her brother; Coronary Art Dis in her brother, father, and mother; Diabetes in her maternal aunt. Allergies: Penicillins; Dextromethorphan; Factive [gemifloxacin mesylate]; Food; Mucinex dm [dm-guaifenesin er]; and Other    Physical Exam   Oxygen Saturation Interpretation: Normal.   ED Triage Vitals [04/28/19 0956]   BP Temp Temp Source Pulse Resp SpO2 Height Weight   (!) 152/82 98.1 °F (36.7 °C) Oral 97 16 97 % -- 130 lb (59 kg)       Physical Exam  · Constitutional/General: Alert and oriented x3, well appearing, non toxic in NAD  · HEENT:  NC/NT. PERRLA,  Airway patent. · Neck: Supple, full ROM, non tender to palpation in the midline, no stridor, no crepitus, no meningeal signs  · Respiratory: Lungs clear to auscultation bilaterally, no wheezes, rales, or rhonchi. Not in respiratory distress  · CV:  Regular rate. Regular rhythm. No murmurs, gallops, or rubs. 2+ distal pulses  · Chest: No chest wall tenderness  · GI:  Abdomen Soft, Non tender, Non distended. +BS. No rebound, guarding, or rigidity. No pulsatile masses. · Musculoskeletal: Moves all extremities x 4. Warm and well perfused, no clubbing, cyanosis, or edema. Capillary refill <3 seconds  · Integument: skin warm and dry. No rashes.    · Lymphatic: no lymphadenopathy noted  · Neurologic: GCS 15, no focal deficits, symmetric strength 5/5 in the upper and lower extremities bilaterally  · Psychiatric: Normal Affect    Lab / Imaging Results   (All laboratory and radiology results have been personally reviewed by myself)  Labs:  Results for orders placed or performed during the hospital encounter of 04/28/19   CBC Auto Differential   Result Value Ref Range    WBC 5.1 4.5 - 11.5 E9/L    RBC 5.07 3.50 - 5.50 E12/L    Hemoglobin 15.4 11.5 - 15.5 g/dL    Hematocrit 45.5 34.0 - 48.0 %    MCV 89.7 80.0 - 99.9 fL    MCH 30.4 26.0 - 35.0 pg    MCHC 33.8 32.0 - 34.5 %    RDW 13.0 11.5 - 15.0 fL    Platelets 043 921 - 638 E9/L    MPV 9.3 7.0 - 12.0 fL    Neutrophils % 75.3 43.0 - 80.0 %    Immature Granulocytes % 0.4 0.0 - 5.0 %    Lymphocytes % 15.6 (L) 20.0 - 42.0 %    Monocytes % 7.1 2.0 - 12.0 %    Eosinophils % 1.6 0.0 - 6.0 %    Basophils % 0.0 0.0 - 2.0 %    Neutrophils # 3.81 1.80 - 7.30 E9/L    Immature Granulocytes # 0.02 E9/L    Lymphocytes # 0.79 (L) 1.50 - 4.00 E9/L    Monocytes # 0.36 0.10 - 0.95 E9/L    Eosinophils # 0.08 0.05 - 0.50 E9/L    Basophils # 0.00 0.00 - 0.20 E9/L   POCT Venous   Result Value Ref Range    POC Sodium 133 132 - 146 mmol/L    POC Potassium 4.4 3.5 - 5.0 mmol/L    POC Chloride 99 (L) 100 - 108 mmol/L    CO2 27 22 - 29 mmol/L    POC Anion Gap 7 7 - 16 mmol/L    POC Glucose 91 74 - 99 mg/dl    POC BUN 9 8 - 23 mg/dL    POC Creatinine 0.8 0.5 - 1.0 mg/dL    GFR Non-African American >60 >=60 mL/min/1.73    GFR  >60      Imaging: All Radiology results interpreted by Radiologist unless otherwise noted. No orders to display       ED Course / Medical Decision Making   Medications - No data to display       Consult(s):   None    Procedure(s):   none    MDM:   Patient stated that she started having diarrhea yesterday and it's persisted this morning. She's been drinking water and Gatorade and eating bananas and cheese trying to \"bind herself up \"she did take some Imodium and also didn't help. She has had no diarrhea since she has been here her exam was negative her abdomen is soft and is not tender anywhere to palpation. Her  is concerned that she is dehydrated. I did tell her and her  that we do not do IV fluids here and she can take in orally as a series. The patients not having any vomiting or nausea. I did check labs on her and they were within normal limits.   I told the patient and her  that if they're concerned with dehydration -- they will have to go to the emergency department, she can continue with oral hydration at home I did order her some Lomotil to take as

## 2019-04-29 ENCOUNTER — OFFICE VISIT (OUTPATIENT)
Dept: FAMILY MEDICINE CLINIC | Age: 79
End: 2019-04-29
Payer: MEDICARE

## 2019-04-29 VITALS
DIASTOLIC BLOOD PRESSURE: 70 MMHG | HEIGHT: 64 IN | RESPIRATION RATE: 20 BRPM | BODY MASS INDEX: 21.34 KG/M2 | SYSTOLIC BLOOD PRESSURE: 132 MMHG | OXYGEN SATURATION: 99 % | WEIGHT: 125 LBS | HEART RATE: 80 BPM

## 2019-04-29 DIAGNOSIS — R19.7 DIARRHEA, UNSPECIFIED TYPE: ICD-10-CM

## 2019-04-29 PROCEDURE — G8427 DOCREV CUR MEDS BY ELIG CLIN: HCPCS | Performed by: FAMILY MEDICINE

## 2019-04-29 PROCEDURE — 99213 OFFICE O/P EST LOW 20 MIN: CPT | Performed by: FAMILY MEDICINE

## 2019-04-29 PROCEDURE — 4040F PNEUMOC VAC/ADMIN/RCVD: CPT | Performed by: FAMILY MEDICINE

## 2019-04-29 PROCEDURE — 1123F ACP DISCUSS/DSCN MKR DOCD: CPT | Performed by: FAMILY MEDICINE

## 2019-04-29 PROCEDURE — 1036F TOBACCO NON-USER: CPT | Performed by: FAMILY MEDICINE

## 2019-04-29 PROCEDURE — 1090F PRES/ABSN URINE INCON ASSESS: CPT | Performed by: FAMILY MEDICINE

## 2019-04-29 PROCEDURE — G8420 CALC BMI NORM PARAMETERS: HCPCS | Performed by: FAMILY MEDICINE

## 2019-04-29 PROCEDURE — G8399 PT W/DXA RESULTS DOCUMENT: HCPCS | Performed by: FAMILY MEDICINE

## 2019-04-29 RX ORDER — DIPHENOXYLATE HYDROCHLORIDE AND ATROPINE SULFATE 2.5; .025 MG/1; MG/1
1 TABLET ORAL 4 TIMES DAILY PRN
Qty: 60 TABLET | Refills: 0 | Status: SHIPPED | OUTPATIENT
Start: 2019-04-29 | End: 2019-05-01

## 2019-04-29 ASSESSMENT — ENCOUNTER SYMPTOMS
SHORTNESS OF BREATH: 0
ABDOMINAL PAIN: 1
BLOOD IN STOOL: 0
DIARRHEA: 1
VOMITING: 0
NAUSEA: 0

## 2019-04-29 NOTE — PATIENT INSTRUCTIONS
Patient Education        Diarrhea: Care Instructions  Your Care Instructions    Diarrhea is loose, watery stools (bowel movements). The exact cause is often hard to find. Sometimes diarrhea is your body's way of getting rid of what caused an upset stomach. Viruses, food poisoning, and many medicines can cause diarrhea. Some people get diarrhea in response to emotional stress, anxiety, or certain foods. Almost everyone has diarrhea now and then. It usually isn't serious, and your stools will return to normal soon. The important thing to do is replace the fluids you have lost, so you can prevent dehydration. The doctor has checked you carefully, but problems can develop later. If you notice any problems or new symptoms, get medical treatment right away. Follow-up care is a key part of your treatment and safety. Be sure to make and go to all appointments, and call your doctor if you are having problems. It's also a good idea to know your test results and keep a list of the medicines you take. How can you care for yourself at home? · Watch for signs of dehydration, which means your body has lost too much water. Dehydration is a serious condition and should be treated right away. Signs of dehydration are:  ? Increasing thirst and dry eyes and mouth. ? Feeling faint or lightheaded. ? Darker urine, and a smaller amount of urine than normal.  · To prevent dehydration, drink plenty of fluids, enough so that your urine is light yellow or clear like water. Choose water and other caffeine-free clear liquids until you feel better. If you have kidney, heart, or liver disease and have to limit fluids, talk with your doctor before you increase the amount of fluids you drink. · Begin eating small amounts of mild foods the next day, if you feel like it. ? Try yogurt that has live cultures of Lactobacillus. (Check the label.)  ?  Avoid spicy foods, fruits, alcohol, and caffeine until 48 hours after all symptoms are gone.  ? Avoid chewing gum that contains sorbitol. ? Avoid dairy products (except for yogurt with Lactobacillus) while you have diarrhea and for 3 days after symptoms are gone. · The doctor may recommend that you take over-the-counter medicine, such as loperamide (Imodium), if you still have diarrhea after 6 hours. Read and follow all instructions on the label. Do not use this medicine if you have bloody diarrhea, a high fever, or other signs of serious illness. Call your doctor if you think you are having a problem with your medicine. When should you call for help? Call 911 anytime you think you may need emergency care. For example, call if:    · You passed out (lost consciousness).     · Your stools are maroon or very bloody.    Call your doctor now or seek immediate medical care if:    · You are dizzy or lightheaded, or you feel like you may faint.     · Your stools are black and look like tar, or they have streaks of blood.     · You have new or worse belly pain.     · You have symptoms of dehydration, such as:  ? Dry eyes and a dry mouth. ? Passing only a little dark urine. ? Feeling thirstier than usual.     · You have a new or higher fever.    Watch closely for changes in your health, and be sure to contact your doctor if:    · Your diarrhea is getting worse.     · You see pus in the diarrhea.     · You are not getting better after 2 days (48 hours). Where can you learn more? Go to https://[employer] org and sign in to your [employer] account. Enter B555 in the [employer] box to learn more about \"Diarrhea: Care Instructions. \"     If you do not have an account, please click on the \"Sign Up Now\" link. Current as of: September 23, 2018  Content Version: 11.9  © 0055-1652 [employer]. Care instructions adapted under license by [facility] ([facility]).  If you have questions about a medical condition or this instruction, always ask your healthcare professional. [employer] disclaims any warranty or liability for your use of this information.

## 2019-04-29 NOTE — PROGRESS NOTES
[address] [facility], [address]   [address] [facility]   Neo Moody MD     Patient: Thao Mauricio Birth: 1940  Visit Date: 4/29/19    Maria G Ayon is a 67y.o. year old female here today for   Chief Complaint   Patient presents with    ED Follow-up    Diarrhea     x 4 days , er gave nothing       HPI  Diarrhea    This is a new problem. The current episode started in the past 7 days. The problem occurs more than 10 times per day. The problem has been waxing and waning. The stool consistency is described as watery. The patient states that diarrhea awakens her from sleep. Associated symptoms include abdominal pain (right lower) and chills. Pertinent negatives include no fever or vomiting. She has tried anti-motility drug for the symptoms. The treatment provided no relief. Patient went to urgent care yesterday, and had normal electrolytes. Review of Systems   Constitutional: Positive for chills. Negative for fever. Respiratory: Negative for shortness of breath. Cardiovascular: Negative for chest pain. Gastrointestinal: Positive for abdominal pain (right lower) and diarrhea. Negative for blood in stool, nausea and vomiting.        +gassiness       Past medical, surgical, social and/or family historyreviewed, updated as needed, and are non-contributory (unless otherwise stated). Medications, allergies, and problem list also reviewed and updated as needed in patient's record. Current Outpatient Medications   Medication Sig Dispense Refill    diphenoxylate-atropine (LOMOTIL) 2.5-0.025 MG per tablet Take 1 tablet by mouth 4 times daily as needed for Diarrhea for up to 10 doses.  60 tablet 0    traZODone (DESYREL) 50 MG tablet Take 1 tablet by mouth nightly 30 tablet 3    vitamin D (CHOLECALCIFEROL) 1000 UNIT TABS tablet Take 1,000 Units by mouth daily      diazepam (VALIUM) 5 MG tablet Take 1 tablet by mouth nightly as needed for Sleep Venous   Result Value Ref Range    POC Sodium 133 132 - 146 mmol/L    POC Potassium 4.4 3.5 - 5.0 mmol/L    POC Chloride 99 (L) 100 - 108 mmol/L    CO2 27 22 - 29 mmol/L    POC Anion Gap 7 7 - 16 mmol/L    POC Glucose 91 74 - 99 mg/dl    POC BUN 9 8 - 23 mg/dL    POC Creatinine 0.8 0.5 - 1.0 mg/dL    GFR Non-African American >60 >=60 mL/min/1.73    GFR  >60        ASSESSMENT/PLAN  Maria G Gabo was seen today for ed follow-up and diarrhea. Diagnoses and all orders for this visit:    Diarrhea, unspecified type  -     diphenoxylate-atropine (LOMOTIL) 2.5-0.025 MG per tablet; Take 1 tablet by mouth 4 times daily as needed for Diarrhea for up to 10 doses. -     CULTURE STOOL; Future  -     FECAL LEUKOCYTES; Future  -     CLOSTRIDIUM DIFFICILE EIA; Future          Phone/MyChart follow up if tests abnormal.    Return for scheduled appointment. or sooner if necessary. I have reviewed my findings and recommendations with Maria G Ayon.      Glenroy Mayo M.D

## 2019-05-01 ENCOUNTER — TELEPHONE (OUTPATIENT)
Dept: PHYSICAL MEDICINE AND REHAB | Age: 79
End: 2019-05-01

## 2019-05-01 NOTE — TELEPHONE ENCOUNTER
Called patient to follow up on left ankle injection. Patient states 70% effectiveness with no side effects. She also states that she has been going to physical therapy and that she feels that has been helping.

## 2019-05-06 ENCOUNTER — OFFICE VISIT (OUTPATIENT)
Dept: FAMILY MEDICINE CLINIC | Age: 79
End: 2019-05-06
Payer: MEDICARE

## 2019-05-06 VITALS
BODY MASS INDEX: 21.34 KG/M2 | DIASTOLIC BLOOD PRESSURE: 60 MMHG | RESPIRATION RATE: 20 BRPM | OXYGEN SATURATION: 99 % | HEIGHT: 64 IN | SYSTOLIC BLOOD PRESSURE: 118 MMHG | HEART RATE: 72 BPM | WEIGHT: 125 LBS

## 2019-05-06 DIAGNOSIS — F51.01 PRIMARY INSOMNIA: Primary | ICD-10-CM

## 2019-05-06 PROCEDURE — 99213 OFFICE O/P EST LOW 20 MIN: CPT | Performed by: FAMILY MEDICINE

## 2019-05-06 PROCEDURE — G8399 PT W/DXA RESULTS DOCUMENT: HCPCS | Performed by: FAMILY MEDICINE

## 2019-05-06 PROCEDURE — G8510 SCR DEP NEG, NO PLAN REQD: HCPCS | Performed by: FAMILY MEDICINE

## 2019-05-06 PROCEDURE — G8420 CALC BMI NORM PARAMETERS: HCPCS | Performed by: FAMILY MEDICINE

## 2019-05-06 PROCEDURE — 1036F TOBACCO NON-USER: CPT | Performed by: FAMILY MEDICINE

## 2019-05-06 PROCEDURE — 1090F PRES/ABSN URINE INCON ASSESS: CPT | Performed by: FAMILY MEDICINE

## 2019-05-06 PROCEDURE — 1123F ACP DISCUSS/DSCN MKR DOCD: CPT | Performed by: FAMILY MEDICINE

## 2019-05-06 PROCEDURE — G8427 DOCREV CUR MEDS BY ELIG CLIN: HCPCS | Performed by: FAMILY MEDICINE

## 2019-05-06 PROCEDURE — 4040F PNEUMOC VAC/ADMIN/RCVD: CPT | Performed by: FAMILY MEDICINE

## 2019-05-06 RX ORDER — ACETAMINOPHEN,DIPHENHYDRAMINE HCL 500; 25 MG/1; MG/1
1 TABLET, FILM COATED ORAL NIGHTLY PRN
Qty: 14 TABLET | Refills: 0 | COMMUNITY
Start: 2019-05-06 | End: 2019-10-04

## 2019-05-06 RX ORDER — DIPHENHYDRAMINE HCL 25 MG
25 TABLET ORAL NIGHTLY PRN
COMMUNITY
Start: 2019-05-06 | End: 2019-06-05

## 2019-05-06 ASSESSMENT — PATIENT HEALTH QUESTIONNAIRE - PHQ9
2. FEELING DOWN, DEPRESSED OR HOPELESS: 0
1. LITTLE INTEREST OR PLEASURE IN DOING THINGS: 0
SUM OF ALL RESPONSES TO PHQ QUESTIONS 1-9: 0
SUM OF ALL RESPONSES TO PHQ QUESTIONS 1-9: 0
SUM OF ALL RESPONSES TO PHQ9 QUESTIONS 1 & 2: 0

## 2019-05-06 ASSESSMENT — ENCOUNTER SYMPTOMS
VOMITING: 0
SHORTNESS OF BREATH: 0
NAUSEA: 0
EYE PAIN: 1
DIARRHEA: 0

## 2019-05-06 NOTE — PROGRESS NOTES
[address] [facility], [address]   [address] [facility]   Leopoldo Unger MD     Patient: Mariel Manzo Birth: 1940  Visit Date: 5/6/19    Luis F Hanley is a 67y.o. year old female here today for   Chief Complaint   Patient presents with    Insomnia       HPI  Patient states she has been taking otc sleep aid as needed. Trazodone caused dizziness so patient not taking. Sleeps about 3-4 hours then wakes up, and falls back to sleep later on. Patient has been under a lot of stress. Review of Systems   Eyes: Positive for pain (left comes and goes) and visual disturbance (occasional). Respiratory: Negative for shortness of breath. Cardiovascular: Negative for chest pain, palpitations and leg swelling. Gastrointestinal: Negative for diarrhea, nausea and vomiting. Genitourinary: Negative for difficulty urinating, dysuria and frequency. Skin: Negative for rash. Psychiatric/Behavioral: Negative for dysphoric mood. Past medical, surgical, social and/or family historyreviewed, updated as needed, and are non-contributory (unless otherwise stated). Medications, allergies, and problem list also reviewed and updated as needed in patient's record. Current Outpatient Medications   Medication Sig Dispense Refill    diphenhydrAMINE-APAP, sleep, (TYLENOL PM EXTRA STRENGTH)  MG tablet Take 1 tablet by mouth nightly as needed for Sleep 14 tablet 0    diphenhydrAMINE (BENADRYL ALLERGY) 25 MG tablet Take 1 tablet by mouth nightly as needed for Itching      vitamin D (CHOLECALCIFEROL) 1000 UNIT TABS tablet Take 1,000 Units by mouth daily      diazepam (VALIUM) 5 MG tablet Take 1 tablet by mouth nightly as needed for Sleep 90 tablet 1    primidone (MYSOLINE) 250 MG tablet Take 250 mg by mouth 3 times daily.  lamotrigine (LAMICTAL) 100 MG tablet Take 100 mg by mouth 3 times daily.        No current facility-administered medications for this visit. Wt Readings from Last 3 Encounters:   05/06/19 125 lb (56.7 kg)   04/29/19 125 lb (56.7 kg)   04/28/19 130 lb (59 kg)                   Vitals:    05/06/19 0833   BP: 118/60   Pulse: 72   Resp: 20   SpO2: 99%        Physical Exam   Constitutional: She is oriented to person, place, and time. She appears well-developed and well-nourished. Cardiovascular: Normal rate, regular rhythm and normal heart sounds. Exam reveals no friction rub. No murmur heard. Pulmonary/Chest: Effort normal and breath sounds normal. No respiratory distress. She has no wheezes. She has no rales. Abdominal: Soft. Bowel sounds are normal. She exhibits no distension. There is no tenderness. There is no rebound and no guarding. Neurological: She is alert and oriented to person, place, and time. Skin: Skin is warm and dry. Vitals reviewed.     Results for orders placed or performed during the hospital encounter of 04/28/19   CBC Auto Differential   Result Value Ref Range    WBC 5.1 4.5 - 11.5 E9/L    RBC 5.07 3.50 - 5.50 E12/L    Hemoglobin 15.4 11.5 - 15.5 g/dL    Hematocrit 45.5 34.0 - 48.0 %    MCV 89.7 80.0 - 99.9 fL    MCH 30.4 26.0 - 35.0 pg    MCHC 33.8 32.0 - 34.5 %    RDW 13.0 11.5 - 15.0 fL    Platelets 696 199 - 787 E9/L    MPV 9.3 7.0 - 12.0 fL    Neutrophils % 75.3 43.0 - 80.0 %    Immature Granulocytes % 0.4 0.0 - 5.0 %    Lymphocytes % 15.6 (L) 20.0 - 42.0 %    Monocytes % 7.1 2.0 - 12.0 %    Eosinophils % 1.6 0.0 - 6.0 %    Basophils % 0.0 0.0 - 2.0 %    Neutrophils # 3.81 1.80 - 7.30 E9/L    Immature Granulocytes # 0.02 E9/L    Lymphocytes # 0.79 (L) 1.50 - 4.00 E9/L    Monocytes # 0.36 0.10 - 0.95 E9/L    Eosinophils # 0.08 0.05 - 0.50 E9/L    Basophils # 0.00 0.00 - 0.20 E9/L   POCT Venous   Result Value Ref Range    POC Sodium 133 132 - 146 mmol/L    POC Potassium 4.4 3.5 - 5.0 mmol/L    POC Chloride 99 (L) 100 - 108 mmol/L    CO2 27 22 - 29 mmol/L    POC Anion Gap 7 7 - 16 mmol/L    POC Glucose 91 74 -

## 2019-05-28 ENCOUNTER — OFFICE VISIT (OUTPATIENT)
Dept: PHYSICAL MEDICINE AND REHAB | Age: 79
End: 2019-05-28
Payer: MEDICARE

## 2019-05-28 VITALS
HEIGHT: 64 IN | HEART RATE: 76 BPM | BODY MASS INDEX: 22.06 KG/M2 | SYSTOLIC BLOOD PRESSURE: 152 MMHG | DIASTOLIC BLOOD PRESSURE: 84 MMHG | WEIGHT: 129.2 LBS

## 2019-05-28 DIAGNOSIS — M19.072 ARTHRITIS OF LEFT ANKLE: Primary | ICD-10-CM

## 2019-05-28 DIAGNOSIS — M25.572 CHRONIC PAIN OF LEFT ANKLE: ICD-10-CM

## 2019-05-28 DIAGNOSIS — G89.29 CHRONIC PAIN OF LEFT ANKLE: ICD-10-CM

## 2019-05-28 PROCEDURE — G8420 CALC BMI NORM PARAMETERS: HCPCS | Performed by: PHYSICAL MEDICINE & REHABILITATION

## 2019-05-28 PROCEDURE — 4040F PNEUMOC VAC/ADMIN/RCVD: CPT | Performed by: PHYSICAL MEDICINE & REHABILITATION

## 2019-05-28 PROCEDURE — 99213 OFFICE O/P EST LOW 20 MIN: CPT | Performed by: PHYSICAL MEDICINE & REHABILITATION

## 2019-05-28 PROCEDURE — 1036F TOBACCO NON-USER: CPT | Performed by: PHYSICAL MEDICINE & REHABILITATION

## 2019-05-28 PROCEDURE — 1090F PRES/ABSN URINE INCON ASSESS: CPT | Performed by: PHYSICAL MEDICINE & REHABILITATION

## 2019-05-28 PROCEDURE — 1123F ACP DISCUSS/DSCN MKR DOCD: CPT | Performed by: PHYSICAL MEDICINE & REHABILITATION

## 2019-05-28 PROCEDURE — G8427 DOCREV CUR MEDS BY ELIG CLIN: HCPCS | Performed by: PHYSICAL MEDICINE & REHABILITATION

## 2019-05-28 PROCEDURE — G8399 PT W/DXA RESULTS DOCUMENT: HCPCS | Performed by: PHYSICAL MEDICINE & REHABILITATION

## 2019-05-28 NOTE — PROGRESS NOTES
[facility], [address] [facility]  [address] [facility] [place]  Phone: [phone]  Fax: [phone]    PCP: Kwabena Ponce MD  Date of visit: 5/28/19    Chief Complaint   Patient presents with    Ankle Pain     6 week f/u of ankle pain and injection     Interval:   Patient presents today for follow up visit. Ankle injection last visit helped for a few days. She continues PT which helps tremendously. They have been working specifically on her foot. She reports continued left ankle and leg pain. The pain is rated Pain Score:   6, is described as burning, and is located in the left shin, medial leg and ankle joint. The pain is better with rest.  The pain is worse with standing and walking. There is associated numbness/tingling. There is weakness. There is no bowel/bladder changes. The prior workup has included: Xray, EMG, MRI.      The prior treatment has included:  PT: current with relief   Chiropractic: none    Modalities: heat with relief   OTC Tylenol: yes with no relief   NSAIDS: yes with no relief   Opioids: none    Membrane stabilizers: neurontin 100mg TID with no relief, lyrica- side effects, did not help   Muscle relaxers: none   Previous injections: left ankle   Previous surgery at this site: none      Allergies   Allergen Reactions    Penicillins Anaphylaxis and Swelling    Dextromethorphan Hives    Factive [Gemifloxacin Mesylate]     Food Hives     shrimp    Mucinex Dm [Dm-Guaifenesin Er] Hives    Other      THORPHAN       Current Outpatient Medications   Medication Sig Dispense Refill    diphenhydrAMINE-APAP, sleep, (TYLENOL PM EXTRA STRENGTH)  MG tablet Take 1 tablet by mouth nightly as needed for Sleep 14 tablet 0    diphenhydrAMINE (BENADRYL ALLERGY) 25 MG tablet Take 1 tablet by mouth nightly as needed for Itching      vitamin D (CHOLECALCIFEROL) 1000 UNIT TABS tablet Take 1,000 Units by mouth daily      diazepam (VALIUM) 5 MG tablet Take 1 tablet by mouth nightly as needed for Sleep 90 tablet 1    primidone (MYSOLINE) 250 MG tablet Take 250 mg by mouth 3 times daily.  lamotrigine (LAMICTAL) 100 MG tablet Take 100 mg by mouth 3 times daily. No current facility-administered medications for this visit. Past Medical History:   Diagnosis Date    Diverticulosis     Hemorrhoids, internal     Irritable bowel syndrome     Osteoarthritis     rt knee    Osteoporosis     Seizures ([address]     complex partial  last seizure 2008?  Urticaria, chronic        Past Surgical History:   Procedure Laterality Date    APPENDECTOMY      CARPAL TUNNEL RELEASE Left     CHOLECYSTECTOMY      COLONOSCOPY      FINGER TRIGGER RELEASE Left     HERNIA REPAIR Right 10/31/2014    Laparoscopic right inguinal hernia repair with mesh     HYSTERECTOMY      vaginal    KNEE ARTHROSCOPY      right    TONSILLECTOMY AND ADENOIDECTOMY         Family History   Problem Relation Age of Onset    Coronary Art Dis Mother     Coronary Art Dis Father     Coronary Art Dis Brother     Cancer Brother         lymph nodes and lung    Diabetes Maternal Aunt      Social History     Tobacco Use    Smoking status: Never Smoker    Smokeless tobacco: Never Used   Substance Use Topics    Alcohol use: No     Comment: rarely    Drug use: No       Functional Status: The patient is able to ambulate and perform activities of daily living without the use of an assistive device. ROS:    Constitutional: Denies fevers, chills, night sweats, unintentional weight loss     Skin: Denies rash or skin changes     Eyes: Denies vision changes    Ears/Nose/Throat: Denies nasal congestion or sore throat     Respiratory: Denies SOB or cough     Cardiovascular: Denies CP, palpitations, edema      Gastrointestinal: Denies abdominal pain,  N/V, constipation, or diarrhea    Genitourinary: Denies urinary symptoms    Neurologic: See HPI. MSK: See HPI. Psychiatric: + sleep disturbance, anxiety, depression    Hematologic/Lymphatic/Immunologic: Easy bruising       Physical Exam:   Blood pressure (!) 152/84, pulse 76, height 5' 4\" (1.626 m), weight 129 lb 3.2 oz (58.6 kg), not currently breastfeeding. General: well developed and well nourished in no acute distress. Body habitus is thin  HEENT: No rhinorrhea, sneezing, yawning, or lacrimation. No scleral icterus or conjunctival injection. Resp: symmetrical chest expansion, unlabored breathing, respirations unlabored. CV: Heart rate is regular. Peripheral pulses are palpable  Lymph: No visible regional lymphadenopathy. Skin: No rashes or ecchymosis. Normal turgor. Psych: Mood is calm. Affect is normal.   Ext: No edema noted     MSK:   Back/Hip Exam:   Inspection: Pelvis was asymmetric. Lumbar lordotic curvature was decreased. There was no scoliosis. No ecchymoses or erythema. Palpation: Palpatory exam revealed no tenderness along lumbosacral paraspinals, midline spine, SI joint sulcus, greater trochanters and TFL on both side. There was no paraspinal spasms. There were no trigger points. ROM: ROM normal.   Special/provocative testing:   Supine SLR negative     Ankle exam:   No swelling around the medial and lateral malleoli or achilles tendon. Tenderness to palpation over distal tibia and medial malleolus. Tenderness to palpation ankle joint. No pain to palpation over the ATFL, CFL, PTFL, 5th metatarsal base. No pain to palpation over the achilles tendon and retroachilles bursa. ROM normal. Pain with inversion. Negative drawer test.  Negative talar tilt. Negative squeeze test.    Neurological Exam:  Strength:   R  L  Hip Flex  5  5  Knee Ext  5  5  Ankle dorsi  5  5  EHL   5 5  Ankle Plantar  5  5    Sensory:  Decreased sensation LT L4 dermatome on left     Reflexes:   R  L  Patellar  (1+) (1+)  Ankle Jerk  (1+) (1+)      Gait is Antalgic.     Imaging: (personally reviewed by me 05/28/19)  EMG- left L5 radiculopathy     MRI L spine      Intact lumbar vertebral height and alignment without acute osseous   signal and mallet. Multilevel disc space narrowing with degenerative   intrinsic signal and reactive endplate changes. Conus medullaris T12. Nerve roots normally distributed within the thecal sac.       T11-12, broad-based disc bulge and facet arthritis with mild central   spinal canal stenosis. Mild bilateral neural foraminal stenosis.       T12-L1, no disc herniation or significant central spinal canal or   foraminal stenosis.       L1-2, disc bulge and mild facet arthritis with borderline central   spinal canal stenosis. Moderate right and mild left neural foraminal   stenosis.       L2-3, broad-based disc protrusion, facet arthritis and mild   ligamentous hypertrophy with moderate central spinal canal stenosis. Mild right and moderate left neural foraminal stenosis.       L3-4, disc protrusion, facet arthritis and ligamentous hypertrophy   with moderate central spinal canal stenosis and mild bilateral neural   foraminal stenosis.       L4-5, small disc bulge. Mild facet arthritis. No significant central   spinal canal stenosis. Mild bilateral neural foraminal stenosis.       L5-S1, very small disc bulge. Mild facet arthritis. Mild bilateral   neural foraminal stenosis. No significant central spinal canal   stenosis.         Impression   Central spinal canal stenosis is most severe at L1-2 and   L2-3 as discussed. Impression:   Lizette Ansari is a 67 y.o. female    1. Arthritis of left ankle    2. Chronic pain of left ankle        Plan:   · In my opinion, her pain does not appear to be radicular from lumbar spine but related to ankle joint itself. Discussed doing a diagnostic lumbar epidural, but she and her  declined. · Continue further treatment and care with podiatry. · Continue PT. .       The patient was educated about the diagnosis, prognosis, indications, risks and benefits of treatment. An opportunity to ask questions was given to the patient and questions were answered. The patient agreed to proceed with the recommended treatment as described above.      Follow up NEAL Jiménez DO, [facility]   Board Certified Physical Medicine and Rehabilitation

## 2019-06-03 ENCOUNTER — OFFICE VISIT (OUTPATIENT)
Dept: FAMILY MEDICINE CLINIC | Age: 79
End: 2019-06-03
Payer: MEDICARE

## 2019-06-03 VITALS
SYSTOLIC BLOOD PRESSURE: 128 MMHG | RESPIRATION RATE: 20 BRPM | WEIGHT: 126 LBS | HEART RATE: 72 BPM | HEIGHT: 64 IN | OXYGEN SATURATION: 98 % | BODY MASS INDEX: 21.51 KG/M2 | DIASTOLIC BLOOD PRESSURE: 72 MMHG

## 2019-06-03 DIAGNOSIS — N64.4 MASTODYNIA OF LEFT BREAST: ICD-10-CM

## 2019-06-03 DIAGNOSIS — R07.9 CHEST PAIN, UNSPECIFIED TYPE: Primary | ICD-10-CM

## 2019-06-03 PROCEDURE — 1123F ACP DISCUSS/DSCN MKR DOCD: CPT | Performed by: FAMILY MEDICINE

## 2019-06-03 PROCEDURE — 1090F PRES/ABSN URINE INCON ASSESS: CPT | Performed by: FAMILY MEDICINE

## 2019-06-03 PROCEDURE — 4040F PNEUMOC VAC/ADMIN/RCVD: CPT | Performed by: FAMILY MEDICINE

## 2019-06-03 PROCEDURE — G8420 CALC BMI NORM PARAMETERS: HCPCS | Performed by: FAMILY MEDICINE

## 2019-06-03 PROCEDURE — G8427 DOCREV CUR MEDS BY ELIG CLIN: HCPCS | Performed by: FAMILY MEDICINE

## 2019-06-03 PROCEDURE — 1036F TOBACCO NON-USER: CPT | Performed by: FAMILY MEDICINE

## 2019-06-03 PROCEDURE — 93000 ELECTROCARDIOGRAM COMPLETE: CPT | Performed by: FAMILY MEDICINE

## 2019-06-03 PROCEDURE — G8399 PT W/DXA RESULTS DOCUMENT: HCPCS | Performed by: FAMILY MEDICINE

## 2019-06-03 PROCEDURE — 99213 OFFICE O/P EST LOW 20 MIN: CPT | Performed by: FAMILY MEDICINE

## 2019-06-03 RX ORDER — VITAMIN E 268 MG
400 CAPSULE ORAL 2 TIMES DAILY
Qty: 30 CAPSULE | Refills: 3 | Status: SHIPPED | OUTPATIENT
Start: 2019-06-03 | End: 2019-12-26

## 2019-06-03 ASSESSMENT — ENCOUNTER SYMPTOMS
VOMITING: 0
SHORTNESS OF BREATH: 0
NAUSEA: 0

## 2019-06-03 NOTE — PATIENT INSTRUCTIONS
Patient Education        Breast Pain: Care Instructions  Your Care Instructions    Breast tenderness and pain may come and go with your monthly periods (cyclic), or it may not follow any pattern (noncyclic). Breast pain is rarely caused by a serious health problem. You may need tests to find the cause. Follow-up care is a key part of your treatment and safety. Be sure to make and go to all appointments, and call your doctor if you are having problems. It's also a good idea to know your test results and keep a list of the medicines you take. How can you care for yourself at home? · If your doctor gave you medicine, take it exactly as prescribed. Call your doctor if you think you are having a problem with your medicine. · Take an over-the-counter pain medicine, such as acetaminophen (Tylenol), ibuprofen (Advil, Motrin), or naproxen (Aleve), to relieve pain and swelling. Read and follow all instructions on the label. · Do not take two or more pain medicines at the same time unless the doctor told you to. Many pain medicines have acetaminophen, which is Tylenol. Too much acetaminophen (Tylenol) can be harmful. · Wear a supportive bra, such as a sports bra or a jog bra. · Cut down on the amount of fat in your diet. If you need help planning healthy meals, see a dietitian. · Get at least 30 minutes of exercise on most days of the week. Walking is a good choice. You also may want to do other activities, such as running, swimming, cycling, or playing tennis or team sports. · Keep a healthy sleep pattern. Go to bed at the same time every night, and get up at the same time every day. When should you call for help? Call your doctor now or seek immediate medical care if:    · You have new changes in a breast, such as:  ? A lump or thickening in your breast or armpit. ? A change in the breast's size or shape. ? Skin changes, such as dimples or puckers. ? Nipple discharge.   ? A change in the color or feel of the

## 2019-06-03 NOTE — PROGRESS NOTES
[address] [facility], [address]   [address] [facility]   Haley Sharp MD     Patient: Arnold Amato Birth: 1940  Visit Date: 6/3/19    Cara Lizarraga is a 67y.o. year old female here today for   Chief Complaint   Patient presents with    Chest Pain     under breast , x 10 days cant fasten bra hurts so bad ,goes in back cant sleep       HPI  Chest Pain    This is a new problem. The current episode started in the past 7 days. The onset quality is sudden. The problem occurs constantly. The problem has been waxing and waning. Pain location: entire left breast, but worse in lower breast. The pain is at a severity of 10/10. The pain is severe. The quality of the pain is described as sharp and stabbing. The pain radiates to the mid back. Associated symptoms include dizziness (mild, intermittent). Pertinent negatives include no fever, headaches, irregular heartbeat, nausea, palpitations, shortness of breath or vomiting. Area is tender to touch. Review of Systems   Constitutional: Negative for fever. Respiratory: Negative for shortness of breath. Cardiovascular: Positive for chest pain. Negative for palpitations. Gastrointestinal: Negative for nausea and vomiting. Skin: Positive for rash (new cherry hemangiomas lower legs). Neurological: Positive for dizziness (mild, intermittent). Negative for headaches. Past medical, surgical, social and/or family historyreviewed, updated as needed, and are non-contributory (unless otherwise stated). Medications, allergies, and problem list also reviewed and updated as needed in patient's record.      Current Outpatient Medications   Medication Sig Dispense Refill    vitamin E 400 UNIT capsule Take 1 capsule by mouth 2 times daily 30 capsule 3    diphenhydrAMINE-APAP, sleep, (TYLENOL PM EXTRA STRENGTH)  MG tablet Take 1 tablet by mouth nightly as needed for Sleep 14 tablet 0    vitamin D (CHOLECALCIFEROL) 1000 UNIT TABS tablet Take 1,000 Units by mouth daily      diazepam (VALIUM) 5 MG tablet Take 1 tablet by mouth nightly as needed for Sleep 90 tablet 1    primidone (MYSOLINE) 250 MG tablet Take 250 mg by mouth 3 times daily.  lamotrigine (LAMICTAL) 100 MG tablet Take 100 mg by mouth 3 times daily. No current facility-administered medications for this visit. Wt Readings from Last 3 Encounters:   06/03/19 126 lb (57.2 kg)   05/28/19 129 lb 3.2 oz (58.6 kg)   05/06/19 125 lb (56.7 kg)                   Vitals:    06/03/19 1120   BP: 128/72   Pulse: 72   Resp: 20   SpO2: 98%        Physical Exam   Constitutional: She is oriented to person, place, and time. She appears well-developed and well-nourished. No distress. Neck: Neck supple. Carotid bruit is not present. Cardiovascular: Normal rate, regular rhythm and normal heart sounds. Exam reveals no gallop. No murmur heard. Pulmonary/Chest: Effort normal and breath sounds normal. She has no wheezes. She has no rales. She exhibits tenderness (inferior aspect of left breast, no mass palpated). Abdominal: Soft. Bowel sounds are normal. She exhibits no distension. There is no tenderness. Musculoskeletal: She exhibits no edema. Neurological: She is alert and oriented to person, place, and time. Skin: Skin is warm and dry. Psychiatric: She has a normal mood and affect. Vitals reviewed.     Results for orders placed or performed during the hospital encounter of 04/28/19   CBC Auto Differential   Result Value Ref Range    WBC 5.1 4.5 - 11.5 E9/L    RBC 5.07 3.50 - 5.50 E12/L    Hemoglobin 15.4 11.5 - 15.5 g/dL    Hematocrit 45.5 34.0 - 48.0 %    MCV 89.7 80.0 - 99.9 fL    MCH 30.4 26.0 - 35.0 pg    MCHC 33.8 32.0 - 34.5 %    RDW 13.0 11.5 - 15.0 fL    Platelets 405 096 - 313 E9/L    MPV 9.3 7.0 - 12.0 fL    Neutrophils % 75.3 43.0 - 80.0 %    Immature Granulocytes % 0.4 0.0 - 5.0 %    Lymphocytes % 15.6 (L) 20.0 - 42.0 %

## 2019-06-06 ENCOUNTER — HOSPITAL ENCOUNTER (OUTPATIENT)
Dept: MAMMOGRAPHY | Age: 79
Discharge: HOME | End: 2019-06-08
Payer: MEDICARE

## 2019-06-06 ENCOUNTER — HOSPITAL ENCOUNTER (OUTPATIENT)
Dept: ULTRASOUND IMAGING | Age: 79
Discharge: HOME | End: 2019-06-06
Payer: MEDICARE

## 2019-06-06 DIAGNOSIS — N64.4 MASTODYNIA OF LEFT BREAST: ICD-10-CM

## 2019-06-06 PROCEDURE — 77065 DX MAMMO INCL CAD UNI: CPT

## 2019-06-06 PROCEDURE — 76641 ULTRASOUND BREAST COMPLETE: CPT

## 2019-06-13 ENCOUNTER — TELEPHONE (OUTPATIENT)
Dept: FAMILY MEDICINE CLINIC | Age: 79
End: 2019-06-13

## 2019-06-17 ENCOUNTER — OFFICE VISIT (OUTPATIENT)
Dept: FAMILY MEDICINE CLINIC | Age: 79
End: 2019-06-17
Payer: MEDICARE

## 2019-06-17 VITALS
RESPIRATION RATE: 20 BRPM | DIASTOLIC BLOOD PRESSURE: 72 MMHG | HEART RATE: 76 BPM | WEIGHT: 126 LBS | HEIGHT: 64 IN | BODY MASS INDEX: 21.51 KG/M2 | SYSTOLIC BLOOD PRESSURE: 124 MMHG

## 2019-06-17 DIAGNOSIS — N64.4 MASTODYNIA OF LEFT BREAST: Primary | ICD-10-CM

## 2019-06-17 DIAGNOSIS — M54.6 ACUTE LEFT-SIDED THORACIC BACK PAIN: ICD-10-CM

## 2019-06-17 DIAGNOSIS — M25.512 ACUTE PAIN OF LEFT SHOULDER: ICD-10-CM

## 2019-06-17 PROCEDURE — G8427 DOCREV CUR MEDS BY ELIG CLIN: HCPCS | Performed by: FAMILY MEDICINE

## 2019-06-17 PROCEDURE — 99214 OFFICE O/P EST MOD 30 MIN: CPT | Performed by: FAMILY MEDICINE

## 2019-06-17 PROCEDURE — 1036F TOBACCO NON-USER: CPT | Performed by: FAMILY MEDICINE

## 2019-06-17 PROCEDURE — 1090F PRES/ABSN URINE INCON ASSESS: CPT | Performed by: FAMILY MEDICINE

## 2019-06-17 PROCEDURE — G8399 PT W/DXA RESULTS DOCUMENT: HCPCS | Performed by: FAMILY MEDICINE

## 2019-06-17 PROCEDURE — 1123F ACP DISCUSS/DSCN MKR DOCD: CPT | Performed by: FAMILY MEDICINE

## 2019-06-17 PROCEDURE — 4040F PNEUMOC VAC/ADMIN/RCVD: CPT | Performed by: FAMILY MEDICINE

## 2019-06-17 PROCEDURE — G8420 CALC BMI NORM PARAMETERS: HCPCS | Performed by: FAMILY MEDICINE

## 2019-06-17 ASSESSMENT — ENCOUNTER SYMPTOMS
NAUSEA: 0
SHORTNESS OF BREATH: 0
VOMITING: 0

## 2019-06-17 NOTE — PATIENT INSTRUCTIONS
Patient Education        Shoulder Arthritis: Exercises  Your Care Instructions  Here are some examples of typical rehabilitation exercises for your condition. Start each exercise slowly. Ease off the exercise if you start to have pain. Your doctor or physical therapist will tell you when you can start these exercises and which ones will work best for you. How to do the exercises  Shoulder flexion (lying down)    1. Lie on your back, holding a wand with both hands. Your palms should face down as you hold the wand. 2. Keeping your elbows straight, slowly raise your arms over your head. Raise them until you feel a stretch in your shoulders, upper back, and chest.  3. Hold for 15 to 30 seconds. 4. Repeat 2 to 4 times. Shoulder rotation (lying down)    1. Lie on your back. Hold a wand with both hands with your elbows bent and palms up. 2. Keep your elbows close to your body, and move the wand across your body toward the sore arm. 3. Hold for 8 to 12 seconds. 4. Repeat 2 to 4 times. Shoulder internal rotation with towel    1. Hold a towel above and behind your head with the arm that is not sore. 2. With your sore arm, reach behind your back and grasp the towel. 3. With the arm above your head, pull the towel upward. Do this until you feel a stretch on the front and outside of your sore shoulder. 4. Hold 15 to 30 seconds. 5. Repeat 2 to 4 times. Shoulder blade squeeze    1. Stand with your arms at your sides, and squeeze your shoulder blades together. Do not raise your shoulders up as you squeeze. 2. Hold 6 seconds. 3. Repeat 8 to 12 times. Resisted rows    1. Put the band around a solid object at about waist level. (A bedpost will work well.) Each hand should hold an end of the band. 2. With your elbows at your sides and bent to 90 degrees, pull the band back. Your shoulder blades should move toward each other. Return to the starting position. 3. Repeat 8 to 12 times.     External rotator strengthening exercise    1. Start by tying a piece of elastic exercise material to a doorknob. You can use surgical tubing or Thera-Band. (You may also hold one end of the band in each hand.)  2. Stand or sit with your shoulder relaxed and your elbow bent 90 degrees. Your upper arm should rest comfortably against your side. Squeeze a rolled towel between your elbow and your body for comfort. This will help keep your arm at your side. 3. Hold one end of the elastic band with the hand of the painful arm. 4. Start with your forearm across your belly. Slowly rotate the forearm out away from your body. Keep your elbow and upper arm tucked against the towel roll or the side of your body until you begin to feel tightness in your shoulder. Slowly move your arm back to where you started. 5. Repeat 8 to 12 times. Internal rotator strengthening exercise    1. Start by tying a piece of elastic exercise material to a doorknob. You can use surgical tubing or Thera-Band. 2. Stand or sit with your shoulder relaxed and your elbow bent 90 degrees. Your upper arm should rest comfortably against your side. Squeeze a rolled towel between your elbow and your body for comfort. This will help keep your arm at your side. 3. Hold one end of the elastic band in the hand of the painful arm. 4. Slowly rotate your forearm toward your body until it touches your belly. Slowly move it back to where you started. 5. Keep your elbow and upper arm firmly tucked against the towel roll or at your side. 6. Repeat 8 to 12 times. Pendulum swing    1. Hold on to a table or the back of a chair with your good arm. Then bend forward a little and let your sore arm hang straight down. This exercise does not use the arm muscles. Rather, use your legs and your hips to create movement that makes your arm swing freely. 2. Use the movement from your hips and legs to guide the slightly swinging arm back and forth like a pendulum (or elephant trunk). Then guide it in circles that start small (about the size of a dinner plate). Make the circles a bit larger each day, as your pain allows. 3. Do this exercise for 5 minutes, 5 to 7 times each day. 4. As you have less pain, try bending over a little farther to do this exercise. This will increase the amount of movement at your shoulder. Follow-up care is a key part of your treatment and safety. Be sure to make and go to all appointments, and call your doctor if you are having problems. It's also a good idea to know your test results and keep a list of the medicines you take. Where can you learn more? Go to https://Wengo.Shopo. org and sign in to your [employer] account. Enter H562 in the [employer] box to learn more about \"Shoulder Arthritis: Exercises. \"     If you do not have an account, please click on the \"Sign Up Now\" link. Current as of: September 20, 2018  Content Version: 12.0  © 6966-2757 [employer], [employer]. Care instructions adapted under license by [facility] [address] If you have questions about a medical condition or this instruction, always ask your healthcare professional. [place] [address] any warranty or liability for your use of this information.

## 2019-06-17 NOTE — PROGRESS NOTES
[address] [facility], [address]   [address] [facility]   Ansley Linares MD     Patient: Abdoulaye Shipley Birth: 1940  Visit Date: 6/17/19    Calderon Beltrán is a 67y.o. year old female here today for   Chief Complaint   Patient presents with    Breast Pain    Results       HPI  Patient still has left lower breast pain. Patient states pain is intermittent. Pain is worse with raising left arm. Patient had normal left breast ultrasound and diagnostic mammogram.  states she had a grey bruise under left breast after last appointment but has gone away. Patient complaining of left shoulder and shoulder blade pain. Started recently. Review of Systems   Respiratory: Negative for shortness of breath. Cardiovascular: Negative for chest pain. Gastrointestinal: Negative for nausea and vomiting. Musculoskeletal: Positive for arthralgias. Hematological: Does not bruise/bleed easily. Past medical, surgical, social and/or family historyreviewed, updated as needed, and are non-contributory (unless otherwise stated). Medications, allergies, and problem list also reviewed and updated as needed in patient's record. Current Outpatient Medications   Medication Sig Dispense Refill    vitamin E 400 UNIT capsule Take 1 capsule by mouth 2 times daily 30 capsule 3    diphenhydrAMINE-APAP, sleep, (TYLENOL PM EXTRA STRENGTH)  MG tablet Take 1 tablet by mouth nightly as needed for Sleep 14 tablet 0    vitamin D (CHOLECALCIFEROL) 1000 UNIT TABS tablet Take 1,000 Units by mouth daily      diazepam (VALIUM) 5 MG tablet Take 1 tablet by mouth nightly as needed for Sleep 90 tablet 1    primidone (MYSOLINE) 250 MG tablet Take 250 mg by mouth 3 times daily.  lamotrigine (LAMICTAL) 100 MG tablet Take 100 mg by mouth 3 times daily. No current facility-administered medications for this visit.         Wt Readings from Last 3 Encounters: mmol/L    POC Anion Gap 7 7 - 16 mmol/L    POC Glucose 91 74 - 99 mg/dl    POC BUN 9 8 - 23 mg/dL    POC Creatinine 0.8 0.5 - 1.0 mg/dL    GFR Non-African American >60 >=60 mL/min/1.73    GFR  >60        ASSESSMENT/PLAN  Marta Celeste was seen today for breast pain and results. Diagnoses and all orders for this visit:    Mastodynia of left breast        -     Recommended reducing caffeine intake, wear comfortable bras and otc vitamin D    Acute pain of left shoulder  -     XR SHOULDER LEFT (MIN 2 VIEWS); Future    Acute left-sided thoracic back pain  -     XR THORACIC SPINE (MIN 4 VIEWS); Future        Phone/MyChart follow up if tests abnormal.    Return for scheduled appointment. or sooner if necessary. I have reviewed my findings and recommendations with Marta Celeste.      Ruby Leyden, M.D

## 2019-08-19 ENCOUNTER — HOSPITAL ENCOUNTER (OUTPATIENT)
Age: 79
Discharge: HOME | End: 2019-08-19
Payer: MEDICARE

## 2019-08-19 ENCOUNTER — INITIAL CONSULT (OUTPATIENT)
Dept: SURGERY | Age: 79
End: 2019-08-19
Payer: MEDICARE

## 2019-08-19 VITALS
WEIGHT: 135 LBS | BODY MASS INDEX: 23.05 KG/M2 | DIASTOLIC BLOOD PRESSURE: 82 MMHG | HEIGHT: 64 IN | HEART RATE: 72 BPM | SYSTOLIC BLOOD PRESSURE: 124 MMHG

## 2019-08-19 DIAGNOSIS — R10.31 RIGHT GROIN PAIN: Primary | ICD-10-CM

## 2019-08-19 DIAGNOSIS — R10.31 RIGHT GROIN PAIN: ICD-10-CM

## 2019-08-19 LAB
ANION GAP SERPL CALCULATED.3IONS-SCNC: 10 MMOL/L (ref 7–16)
BUN BLDV-MCNC: 13 MG/DL (ref 8–23)
CALCIUM SERPL-MCNC: 9.1 MG/DL (ref 8.6–10.2)
CHLORIDE BLD-SCNC: 103 MMOL/L (ref 98–107)
CO2: 29 MMOL/L (ref 22–29)
CREAT SERPL-MCNC: 0.7 MG/DL (ref 0.5–1)
GFR AFRICAN AMERICAN: >60
GFR NON-AFRICAN AMERICAN: >60 ML/MIN/1.73
GLUCOSE BLD-MCNC: 92 MG/DL (ref 74–99)
POTASSIUM SERPL-SCNC: 4.4 MMOL/L (ref 3.5–5)
SODIUM BLD-SCNC: 142 MMOL/L (ref 132–146)

## 2019-08-19 PROCEDURE — 80048 BASIC METABOLIC PNL TOTAL CA: CPT

## 2019-08-19 PROCEDURE — G8399 PT W/DXA RESULTS DOCUMENT: HCPCS | Performed by: SURGERY

## 2019-08-19 PROCEDURE — 4040F PNEUMOC VAC/ADMIN/RCVD: CPT | Performed by: SURGERY

## 2019-08-19 PROCEDURE — 1036F TOBACCO NON-USER: CPT | Performed by: SURGERY

## 2019-08-19 PROCEDURE — G8420 CALC BMI NORM PARAMETERS: HCPCS | Performed by: SURGERY

## 2019-08-19 PROCEDURE — 36415 COLL VENOUS BLD VENIPUNCTURE: CPT

## 2019-08-19 PROCEDURE — 1090F PRES/ABSN URINE INCON ASSESS: CPT | Performed by: SURGERY

## 2019-08-19 PROCEDURE — 1123F ACP DISCUSS/DSCN MKR DOCD: CPT | Performed by: SURGERY

## 2019-08-19 PROCEDURE — G8427 DOCREV CUR MEDS BY ELIG CLIN: HCPCS | Performed by: SURGERY

## 2019-08-19 PROCEDURE — 99204 OFFICE O/P NEW MOD 45 MIN: CPT | Performed by: SURGERY

## 2019-08-20 ENCOUNTER — HOSPITAL ENCOUNTER (OUTPATIENT)
Dept: CT IMAGING | Age: 79
Discharge: HOME | End: 2019-08-20
Payer: MEDICARE

## 2019-08-20 DIAGNOSIS — R10.31 RIGHT GROIN PAIN: ICD-10-CM

## 2019-08-20 PROCEDURE — 6360000004 HC RX CONTRAST MEDICATION: Performed by: RADIOLOGY

## 2019-08-20 PROCEDURE — 74177 CT ABD & PELVIS W/CONTRAST: CPT

## 2019-08-20 RX ADMIN — IOPAMIDOL 80 ML: 755 INJECTION, SOLUTION INTRAVENOUS at 15:17

## 2019-08-20 RX ADMIN — IOHEXOL 50 ML: 240 INJECTION, SOLUTION INTRATHECAL; INTRAVASCULAR; INTRAVENOUS; ORAL at 15:16

## 2019-08-26 ENCOUNTER — OFFICE VISIT (OUTPATIENT)
Dept: SURGERY | Age: 79
End: 2019-08-26
Payer: MEDICARE

## 2019-08-26 VITALS
WEIGHT: 135 LBS | HEIGHT: 64 IN | HEART RATE: 87 BPM | TEMPERATURE: 98.6 F | BODY MASS INDEX: 23.05 KG/M2 | SYSTOLIC BLOOD PRESSURE: 163 MMHG | DIASTOLIC BLOOD PRESSURE: 91 MMHG

## 2019-08-26 DIAGNOSIS — R10.31 RIGHT GROIN PAIN: Primary | ICD-10-CM

## 2019-08-26 PROCEDURE — G8399 PT W/DXA RESULTS DOCUMENT: HCPCS | Performed by: SURGERY

## 2019-08-26 PROCEDURE — G8427 DOCREV CUR MEDS BY ELIG CLIN: HCPCS | Performed by: SURGERY

## 2019-08-26 PROCEDURE — 99212 OFFICE O/P EST SF 10 MIN: CPT | Performed by: SURGERY

## 2019-08-26 PROCEDURE — 1036F TOBACCO NON-USER: CPT | Performed by: SURGERY

## 2019-08-26 PROCEDURE — 1090F PRES/ABSN URINE INCON ASSESS: CPT | Performed by: SURGERY

## 2019-08-26 PROCEDURE — 1123F ACP DISCUSS/DSCN MKR DOCD: CPT | Performed by: SURGERY

## 2019-08-26 PROCEDURE — 4040F PNEUMOC VAC/ADMIN/RCVD: CPT | Performed by: SURGERY

## 2019-08-26 PROCEDURE — G8420 CALC BMI NORM PARAMETERS: HCPCS | Performed by: SURGERY

## 2019-08-26 RX ORDER — GABAPENTIN 300 MG/1
300 CAPSULE ORAL 3 TIMES DAILY
Qty: 90 CAPSULE | Refills: 0 | Status: SHIPPED | OUTPATIENT
Start: 2019-08-26 | End: 2019-12-26

## 2019-08-26 RX ORDER — GABAPENTIN 300 MG/1
300 CAPSULE ORAL 3 TIMES DAILY
Qty: 90 CAPSULE | Refills: 0 | Status: SHIPPED | OUTPATIENT
Start: 2019-08-26 | End: 2019-09-06

## 2019-08-28 DIAGNOSIS — R10.31 RIGHT GROIN PAIN: Primary | ICD-10-CM

## 2019-09-06 ENCOUNTER — PREP FOR PROCEDURE (OUTPATIENT)
Dept: PAIN MANAGEMENT | Age: 79
End: 2019-09-06

## 2019-09-06 ENCOUNTER — OFFICE VISIT (OUTPATIENT)
Dept: PAIN MANAGEMENT | Age: 79
End: 2019-09-06
Payer: MEDICARE

## 2019-09-06 VITALS
HEIGHT: 64 IN | DIASTOLIC BLOOD PRESSURE: 82 MMHG | OXYGEN SATURATION: 97 % | HEART RATE: 76 BPM | BODY MASS INDEX: 23.05 KG/M2 | RESPIRATION RATE: 16 BRPM | TEMPERATURE: 97.8 F | SYSTOLIC BLOOD PRESSURE: 142 MMHG | WEIGHT: 135 LBS

## 2019-09-06 DIAGNOSIS — M16.11 PRIMARY OSTEOARTHRITIS OF RIGHT HIP: ICD-10-CM

## 2019-09-06 DIAGNOSIS — M51.9 LUMBAR DISC DISORDER: ICD-10-CM

## 2019-09-06 DIAGNOSIS — G89.4 CHRONIC PAIN SYNDROME: ICD-10-CM

## 2019-09-06 DIAGNOSIS — M53.3 DISORDER OF SACRUM: Primary | ICD-10-CM

## 2019-09-06 DIAGNOSIS — M47.816 LUMBAR FACET ARTHROPATHY: ICD-10-CM

## 2019-09-06 DIAGNOSIS — M48.061 SPINAL STENOSIS OF LUMBAR REGION WITHOUT NEUROGENIC CLAUDICATION: ICD-10-CM

## 2019-09-06 DIAGNOSIS — M54.16 LUMBAR RADICULOPATHY: ICD-10-CM

## 2019-09-06 PROCEDURE — G8427 DOCREV CUR MEDS BY ELIG CLIN: HCPCS | Performed by: PAIN MEDICINE

## 2019-09-06 PROCEDURE — G8399 PT W/DXA RESULTS DOCUMENT: HCPCS | Performed by: PAIN MEDICINE

## 2019-09-06 PROCEDURE — 1090F PRES/ABSN URINE INCON ASSESS: CPT | Performed by: PAIN MEDICINE

## 2019-09-06 PROCEDURE — 4040F PNEUMOC VAC/ADMIN/RCVD: CPT | Performed by: PAIN MEDICINE

## 2019-09-06 PROCEDURE — 1123F ACP DISCUSS/DSCN MKR DOCD: CPT | Performed by: PAIN MEDICINE

## 2019-09-06 PROCEDURE — G8420 CALC BMI NORM PARAMETERS: HCPCS | Performed by: PAIN MEDICINE

## 2019-09-06 PROCEDURE — 1036F TOBACCO NON-USER: CPT | Performed by: PAIN MEDICINE

## 2019-09-06 PROCEDURE — 99204 OFFICE O/P NEW MOD 45 MIN: CPT | Performed by: PAIN MEDICINE

## 2019-09-19 ENCOUNTER — HOSPITAL ENCOUNTER (OUTPATIENT)
Dept: OPERATING ROOM | Age: 79
Setting detail: OUTPATIENT SURGERY
Discharge: HOME | End: 2019-09-19
Attending: PAIN MEDICINE
Payer: MEDICARE

## 2019-09-19 ENCOUNTER — HOSPITAL ENCOUNTER (OUTPATIENT)
Age: 79
Setting detail: OUTPATIENT SURGERY
Discharge: HOME | End: 2019-09-19
Attending: PAIN MEDICINE | Admitting: PAIN MEDICINE
Payer: MEDICARE

## 2019-09-19 VITALS
OXYGEN SATURATION: 96 % | DIASTOLIC BLOOD PRESSURE: 89 MMHG | SYSTOLIC BLOOD PRESSURE: 173 MMHG | RESPIRATION RATE: 16 BRPM | HEART RATE: 89 BPM | TEMPERATURE: 98 F

## 2019-09-19 DIAGNOSIS — M46.1 SACROILIITIS (HCC): ICD-10-CM

## 2019-09-19 PROCEDURE — 7100000010 HC PHASE II RECOVERY - FIRST 15 MIN: Performed by: PAIN MEDICINE

## 2019-09-19 PROCEDURE — 7100000011 HC PHASE II RECOVERY - ADDTL 15 MIN: Performed by: PAIN MEDICINE

## 2019-09-19 PROCEDURE — 2709999900 HC NON-CHARGEABLE SUPPLY: Performed by: PAIN MEDICINE

## 2019-09-19 PROCEDURE — 3600000015 HC SURGERY LEVEL 5 ADDTL 15MIN: Performed by: PAIN MEDICINE

## 2019-09-19 PROCEDURE — 27096 INJECT SACROILIAC JOINT: CPT | Performed by: PAIN MEDICINE

## 2019-09-19 PROCEDURE — A9579 GAD-BASE MR CONTRAST NOS,1ML: HCPCS | Performed by: PAIN MEDICINE

## 2019-09-19 PROCEDURE — 2500000003 HC RX 250 WO HCPCS: Performed by: PAIN MEDICINE

## 2019-09-19 PROCEDURE — 6360000002 HC RX W HCPCS: Performed by: PAIN MEDICINE

## 2019-09-19 PROCEDURE — 6360000004 HC RX CONTRAST MEDICATION: Performed by: PAIN MEDICINE

## 2019-09-19 PROCEDURE — 3600000005 HC SURGERY LEVEL 5 BASE: Performed by: PAIN MEDICINE

## 2019-09-19 PROCEDURE — 3209999900 FLUORO FOR SURGICAL PROCEDURES

## 2019-09-19 RX ORDER — LIDOCAINE HYDROCHLORIDE 5 MG/ML
INJECTION, SOLUTION INFILTRATION; INTRAVENOUS PRN
Status: DISCONTINUED | OUTPATIENT
Start: 2019-09-19 | End: 2019-09-19 | Stop reason: ALTCHOICE

## 2019-09-19 ASSESSMENT — PAIN - FUNCTIONAL ASSESSMENT: PAIN_FUNCTIONAL_ASSESSMENT: 0-10

## 2019-09-19 ASSESSMENT — PAIN SCALES - GENERAL: PAINLEVEL_OUTOF10: 3

## 2019-09-19 ASSESSMENT — PAIN DESCRIPTION - DESCRIPTORS: DESCRIPTORS: ACHING;DISCOMFORT

## 2019-09-19 NOTE — OP NOTE
2019    Patient: Johnny Hickman  :  1940  Age:  67 y.o. Sex:  female     PRE-OPERATIVE DIAGNOSIS: Right   Sacroiliitis, somatic dysfunction of the lumbosacral spine. POST-OPERATIVE DIAGNOSIS: Same. PROCEDURE:  Fluoroscopic guided Right   sacroiliac joint injection with steroid (#1). SURGEON:  DELROY Jimenez M.D. ANESTHESIA: Local    ESTIMATED BLOOD LOSS: None.  ______________________________________________________________________  BRIEF HISTORY:  Johnny Hickman comes in today for first Right sacroiliac joint injection under fluoroscopic guidance. The potential complications as well as the procedure in detail were explained to her today. She has elected to undergo the aforementioned procedure. Courtney's complete History & Physical examination were reviewed in depth, a copy of which is in the chart. DESCRIPTION OF PROCEDURE:    After confirming written and informed consent, a time-out was performed and T[place] name and date of birth, the procedure to be performed as well as the plan for the location of the needle insertion were confirmed. The patient was brought into the procedure room and placed in the prone position on the fluoroscopy table. Standard monitors were placed and vital signs were observed throughout the procedure. The low back and upper buttocks area was prepped with chloraprep and draped in a sterile manner. AP fluoroscopy was used to visualize the sacroiliac joint. The fluoroscopic beam was then obliqued until the anterior and posterior margins of the joint were aligned. The inferior margin of the joint was identified and marked. The skin and subcutaneous tissue about this identified point were anesthestized with 0.5% lidocaine. A 22 gauge 3 1/2 spinal needle was advanced toward the the identified point under fluoroscopic guidance.  Once the targeted point was reached and the joint space was entered, negative aspiration was confirmed, and 0.5 cc of 240 omnipaque was injected. The  Joint space was appropriately outlined. Then, after negative aspiration, a solution consisiting of 0.25% marcaine 2 cc and 40 mg DepoMedrol was easily injected. The needle was then removed and the needle insertion site was covered with Band-Aid. Disposition the patient tolerated the procedure well and there were no complications . Vital signs remained stable throughout the procedure. The patient was escorted to the recovery area where they remained until discharge and written discharge instructions for the procedure were given. Plan: Jelena Beltran will return to our pain management center as scheduled.      Josh Pollock MD

## 2019-09-19 NOTE — H&P
[address]  [address] [facility], [address] [place] [address] [place]  [phone]    Procedure History & Physical      Pietro Escobedo     HPI:    Patient  is here for right buttocks pain for Right SIJ injection  Labs/imaging studies reviewed   All question and concerns addressed including R/B/A associated with the procedure    Past Medical History:   Diagnosis Date    Diverticulosis     Hemorrhoids, internal     Irritable bowel syndrome     Osteoarthritis     rt knee    Osteoporosis     Seizures ([address]     complex partial  last seizure 2008?  Urticaria, chronic        Past Surgical History:   Procedure Laterality Date    APPENDECTOMY      CARPAL TUNNEL RELEASE Left     CHOLECYSTECTOMY      COLONOSCOPY      FINGER TRIGGER RELEASE Left     HERNIA REPAIR Right 10/31/2014    Laparoscopic right inguinal hernia repair with mesh     HYSTERECTOMY      vaginal    KNEE ARTHROSCOPY      right    TONSILLECTOMY AND ADENOIDECTOMY         Prior to Admission medications    Medication Sig Start Date End Date Taking? Authorizing Provider   gabapentin (NEURONTIN) 300 MG capsule Take 1 capsule by mouth 3 times daily for 30 days. 8/26/19 9/25/19 Yes Petar Siddiqui MD   vitamin E 400 UNIT capsule Take 1 capsule by mouth 2 times daily 6/3/19  Yes Carlos Eduardo Koenig MD   diphenhydrAMINE-APAP, sleep, (TYLENOL PM EXTRA STRENGTH)  MG tablet Take 1 tablet by mouth nightly as needed for Sleep 5/6/19  Yes Carlos Eduardo Koenig MD   vitamin D (CHOLECALCIFEROL) 1000 UNIT TABS tablet Take 1,000 Units by mouth daily   Yes Historical Provider, MD   diazepam (VALIUM) 5 MG tablet Take 1 tablet by mouth nightly as needed for Sleep 8/8/17  Yes Carlos Eduardo Koenig MD   primidone (MYSOLINE) 250 MG tablet Take 250 mg by mouth 3 times daily. Yes Historical Provider, MD   lamotrigine (LAMICTAL) 100 MG tablet Take 100 mg by mouth 3 times daily.  2/14/11  Yes Historical Provider, MD

## 2019-09-26 ENCOUNTER — OFFICE VISIT (OUTPATIENT)
Dept: PAIN MANAGEMENT | Age: 79
End: 2019-09-26
Payer: MEDICARE

## 2019-09-26 VITALS
OXYGEN SATURATION: 98 % | RESPIRATION RATE: 18 BRPM | HEART RATE: 80 BPM | DIASTOLIC BLOOD PRESSURE: 86 MMHG | SYSTOLIC BLOOD PRESSURE: 140 MMHG | TEMPERATURE: 98.5 F

## 2019-09-26 DIAGNOSIS — M48.061 SPINAL STENOSIS OF LUMBAR REGION WITHOUT NEUROGENIC CLAUDICATION: ICD-10-CM

## 2019-09-26 DIAGNOSIS — G89.4 CHRONIC PAIN SYNDROME: ICD-10-CM

## 2019-09-26 DIAGNOSIS — M51.9 LUMBAR DISC DISORDER: Primary | ICD-10-CM

## 2019-09-26 DIAGNOSIS — M54.16 LUMBAR RADICULOPATHY: ICD-10-CM

## 2019-09-26 DIAGNOSIS — M16.11 PRIMARY OSTEOARTHRITIS OF RIGHT HIP: ICD-10-CM

## 2019-09-26 DIAGNOSIS — M47.816 LUMBAR FACET ARTHROPATHY: ICD-10-CM

## 2019-09-26 DIAGNOSIS — M53.3 DISORDER OF SACRUM: ICD-10-CM

## 2019-09-26 DIAGNOSIS — M87.051 AVASCULAR NECROSIS OF BONE OF RIGHT HIP (HCC): ICD-10-CM

## 2019-09-26 PROCEDURE — 1090F PRES/ABSN URINE INCON ASSESS: CPT | Performed by: PAIN MEDICINE

## 2019-09-26 PROCEDURE — 1123F ACP DISCUSS/DSCN MKR DOCD: CPT | Performed by: PAIN MEDICINE

## 2019-09-26 PROCEDURE — 1036F TOBACCO NON-USER: CPT | Performed by: PAIN MEDICINE

## 2019-09-26 PROCEDURE — G8427 DOCREV CUR MEDS BY ELIG CLIN: HCPCS | Performed by: PAIN MEDICINE

## 2019-09-26 PROCEDURE — G8399 PT W/DXA RESULTS DOCUMENT: HCPCS | Performed by: PAIN MEDICINE

## 2019-09-26 PROCEDURE — 99214 OFFICE O/P EST MOD 30 MIN: CPT | Performed by: PAIN MEDICINE

## 2019-09-26 PROCEDURE — 99213 OFFICE O/P EST LOW 20 MIN: CPT

## 2019-09-26 PROCEDURE — 4040F PNEUMOC VAC/ADMIN/RCVD: CPT | Performed by: PAIN MEDICINE

## 2019-09-26 PROCEDURE — G8420 CALC BMI NORM PARAMETERS: HCPCS | Performed by: PAIN MEDICINE

## 2019-09-26 NOTE — PROGRESS NOTES
[address]  [address] [facility], [address] [place] [address] [place]  [phone]    Follow up Note    Carleene Sever     Date of Visit:  9/26/2019    CC:  Patient presents for follow up   Chief Complaint   Patient presents with    Groin Pain     right groin       HPI:    Pain is better. right buttocks  Change in quality of symptoms:no. Medication side effects:not applicable . Recent diagnostic testing:right hip Xray. Recent interventional procedures:Right SIJ injection. good. Imaging:  Lumbar spine MRI 02/2019  Central spinal canal stenosis is most severe at L1-2 and   L2-3 as discussed. EMG Left lower extremity 2019  left L5 radiculopathy    Right hip Xray 09/2019  Sclerotic changes in the right hip may be due to AVN. Consider MR   correlation.      Previous treatments: Physical Therapy and medications. .         Potential Aberrant Drug-Related Behavior:  No    Urine Drug Screening:  None, no opioids started     OARRS report:  09/2019 consistent     Past Medical History:   Diagnosis Date    Diverticulosis     Hemorrhoids, internal     Irritable bowel syndrome     Osteoarthritis     rt knee    Osteoporosis     Seizures ([address]     complex partial  last seizure 2008?  Urticaria, chronic      Past Surgical History:   Procedure Laterality Date    APPENDECTOMY      CARPAL TUNNEL RELEASE Left     CHOLECYSTECTOMY      COLONOSCOPY      FINGER TRIGGER RELEASE Left     HC INJECTION PROCEDURE FOR SACROILIAC JOINT Right 9/19/2019    RIGHT SACROILIAC JOINT INJECTION performed by Wolfgang Serra MD at [address] [facility] Right 10/31/2014    Laparoscopic right inguinal hernia repair with mesh     HYSTERECTOMY      vaginal    KNEE ARTHROSCOPY      right    NERVE BLOCK Right 09/19/2019    sacroiliac joint    TONSILLECTOMY AND ADENOIDECTOMY       Prior to Admission medications    Medication Sig Start Date End Date Taking?  Authorizing Provider   vitamin E 400 UNIT Talks on phone: Not on file     Gets together: Not on file     Attends Caodaism service: Not on file     Active member of club or organization: Not on file     Attends meetings of clubs or organizations: Not on file     Relationship status: Not on file    Intimate partner violence:     Fear of current or ex partner: Not on file     Emotionally abused: Not on file     Physically abused: Not on file     Forced sexual activity: Not on file   Other Topics Concern    Not on file   Social History Narrative    Not on file     Family History   Problem Relation Age of Onset    Coronary Art Dis Mother     Coronary Art Dis Father     Coronary Art Dis Brother     Cancer Brother         lymph nodes and lung    Diabetes Maternal Aunt      REVIEW OF SYSTEMS:     Farzana Martinez denies fever/chills, chest pain, shortness of breath, new bowel or bladder complaints. All other review of systems was negative. PHYSICAL EXAMINATION:      BP (!) 140/86   Pulse 80   Temp 98.5 °F (36.9 °C)   Resp 18   SpO2 98%     .   General:       General appearance:   elderly, pleasant and well-hydrated. , in mild to moderate discomfort and A & O x3  Build:Normal Weight     HEENT:     Head:normocephalic and atraumatic  Pupils:regular, round and equal.  Sclera: icterus absent,      Lungs:     Breathing:Breathing Pattern: regular, no distress     Abdomen:     Shape:non-distended and normal  Tenderness:none     Lumbar spine:     Spine inspection:normal   CVA tenderness:No   Palpation:tenderness paravertebral muscles, facet loading, right, positive and tenderness.   Range of motion:abnormal mildly Lateral bending, flexion, extension rotation right and is  painful.     Musculoskeletal:     Trigger points in Paraveteral:absent bilaterally  SI joint tenderness:positive right, negative left              STEFANI test:positive right, negative Left              Positive thigh thrust Right   Piriformis tenderness:negative right, negative left  Trochanteric bursa tenderness:negative right, negative left  SLR:negative right, negative left, sitting      Extremities:     Tremors:None bilaterally upper and lower  Range of motion: pain with internal rotation of hips positive right   Intact:Yes  Edema:Normal  Severe osteoarthritis bilateral hands      Knee:     Inspection:symmetric, swelling none bilaterally  Tenderness of Bony Landmarks:Lateral and Medial, bilateral  Drawer Test:negative  Effusion:absent bilaterally  Crepitus:absent bilaterally  ROM:Left limited by pain  Right limited by pain      Neurological:     Sensory:normal to light touch bilateral lower extremities  Motor:                         Right Quadriceps5/5          Left Quadriceps5/5           Right Gastrocnemius5/5    Left Gastrocnemius5/5  Right Ant Tibialis5/5  Left Ant Tibialis5/5  Reflexes:    Right Quadriceps reflex0  Left Quadriceps reflex0  Right Achilles reflex0  Left Achilles reflex0  Gait:antalgic  Right leg is shorter than Left     Dermatology:     Skin:no unusual rashes and no skin lesions     Impression:  Right hip and groin pain with SIJ tenderness on exam with bilateral knee osteoarthritis  Lumbar spine MRI central spinal stenosis worst at [facility]:  Follow up on her right groin pain  Patient is s/p Right SIJ injection with good relief in her Right buttocks pain  Her right groin pain got better for 2 days and it started coming back, results of Right hip Xray were discussed with the patient, ?? Right hip AVN  Will schedule patient for Right hip MRI  Continue with OTC pain medications   OARRS report reviewed  Patient encouraged to stay active  Treatment plan discussed with the patient including medications side effects     We discussed with the patient that combining opioids, benzodiazepines, alcohol, illicit drugs or sleep aids increases the risk of respiratory depression including death.  We discussed that these medications may cause drowsiness, sedation or dizziness and have counseled

## 2019-09-26 NOTE — PROGRESS NOTES
Gracie Osuna presents to the [address] [address] on 9/26/2019. Farzana Martinez is complaining of pain in her right groin. The pain is constant. The pain is described as pinching. Pain is rated on her best day at a 8, on her worst day at a 10, and on average at a 8 on the VAS scale. She took her last dose of Tylenol yesterday. Any procedures since your last visit: Yes, right sacroiliac joint injection  with 90 % relief for 2 days with the groin pain, but the burning in the back has remained decreased. She is not on NSAIDS and  is not on anticoagulation medications. Pacemaker or defibrilator: No Physician managing device is N/A.       BP (!) 140/86   Pulse 80   Temp 98.5 °F (36.9 °C)   Resp 18   SpO2 98%      No LMP recorded.  Patient is postmenopausal.

## 2019-09-30 ENCOUNTER — HOSPITAL ENCOUNTER (OUTPATIENT)
Dept: MRI IMAGING | Age: 79
Discharge: HOME | End: 2019-10-02
Payer: MEDICARE

## 2019-09-30 DIAGNOSIS — M87.051 AVASCULAR NECROSIS OF BONE OF RIGHT HIP (HCC): ICD-10-CM

## 2019-09-30 PROCEDURE — 73721 MRI JNT OF LWR EXTRE W/O DYE: CPT

## 2019-10-03 ENCOUNTER — PREP FOR PROCEDURE (OUTPATIENT)
Dept: PAIN MANAGEMENT | Age: 79
End: 2019-10-03

## 2019-10-03 DIAGNOSIS — M87.051 AVASCULAR NECROSIS OF BONE OF RIGHT HIP (HCC): Primary | ICD-10-CM

## 2019-10-04 ENCOUNTER — TELEPHONE (OUTPATIENT)
Dept: PAIN MANAGEMENT | Age: 79
End: 2019-10-04

## 2019-10-08 ENCOUNTER — HOSPITAL ENCOUNTER (OUTPATIENT)
Age: 79
Setting detail: OUTPATIENT SURGERY
Discharge: HOME | End: 2019-10-08
Attending: PAIN MEDICINE | Admitting: PAIN MEDICINE
Payer: MEDICARE

## 2019-10-08 ENCOUNTER — HOSPITAL ENCOUNTER (OUTPATIENT)
Dept: OPERATING ROOM | Age: 79
Discharge: HOME | End: 2019-10-08
Payer: MEDICARE

## 2019-10-08 VITALS
OXYGEN SATURATION: 98 % | RESPIRATION RATE: 14 BRPM | HEART RATE: 76 BPM | TEMPERATURE: 98 F | DIASTOLIC BLOOD PRESSURE: 75 MMHG | SYSTOLIC BLOOD PRESSURE: 181 MMHG

## 2019-10-08 DIAGNOSIS — M25.551 PAIN OF RIGHT HIP JOINT: ICD-10-CM

## 2019-10-08 PROCEDURE — 6360000002 HC RX W HCPCS: Performed by: PAIN MEDICINE

## 2019-10-08 PROCEDURE — 2709999900 HC NON-CHARGEABLE SUPPLY: Performed by: PAIN MEDICINE

## 2019-10-08 PROCEDURE — 7100000011 HC PHASE II RECOVERY - ADDTL 15 MIN: Performed by: PAIN MEDICINE

## 2019-10-08 PROCEDURE — 20610 DRAIN/INJ JOINT/BURSA W/O US: CPT | Performed by: PAIN MEDICINE

## 2019-10-08 PROCEDURE — 77002 NEEDLE LOCALIZATION BY XRAY: CPT | Performed by: PAIN MEDICINE

## 2019-10-08 PROCEDURE — 3600000005 HC SURGERY LEVEL 5 BASE: Performed by: PAIN MEDICINE

## 2019-10-08 PROCEDURE — 7100000010 HC PHASE II RECOVERY - FIRST 15 MIN: Performed by: PAIN MEDICINE

## 2019-10-08 PROCEDURE — 2500000003 HC RX 250 WO HCPCS: Performed by: PAIN MEDICINE

## 2019-10-08 PROCEDURE — A9579 GAD-BASE MR CONTRAST NOS,1ML: HCPCS | Performed by: PAIN MEDICINE

## 2019-10-08 PROCEDURE — 6360000004 HC RX CONTRAST MEDICATION: Performed by: PAIN MEDICINE

## 2019-10-08 PROCEDURE — 3209999900 FLUORO FOR SURGICAL PROCEDURES

## 2019-10-08 RX ORDER — LIDOCAINE HYDROCHLORIDE 5 MG/ML
INJECTION, SOLUTION INFILTRATION; INTRAVENOUS PRN
Status: DISCONTINUED | OUTPATIENT
Start: 2019-10-08 | End: 2019-10-08 | Stop reason: ALTCHOICE

## 2019-10-08 ASSESSMENT — PAIN SCALES - GENERAL
PAINLEVEL_OUTOF10: 0
PAINLEVEL_OUTOF10: 0

## 2019-10-08 ASSESSMENT — PAIN DESCRIPTION - DESCRIPTORS: DESCRIPTORS: ACHING;DISCOMFORT

## 2019-10-08 ASSESSMENT — PAIN - FUNCTIONAL ASSESSMENT: PAIN_FUNCTIONAL_ASSESSMENT: 0-10

## 2019-10-24 ENCOUNTER — OFFICE VISIT (OUTPATIENT)
Dept: FAMILY MEDICINE CLINIC | Age: 79
End: 2019-10-24
Payer: MEDICARE

## 2019-10-24 VITALS
SYSTOLIC BLOOD PRESSURE: 122 MMHG | WEIGHT: 127 LBS | OXYGEN SATURATION: 96 % | DIASTOLIC BLOOD PRESSURE: 64 MMHG | HEIGHT: 64 IN | BODY MASS INDEX: 21.68 KG/M2 | RESPIRATION RATE: 20 BRPM | HEART RATE: 79 BPM

## 2019-10-24 DIAGNOSIS — S80.12XA CONTUSION OF LEFT KNEE AND LOWER LEG, INITIAL ENCOUNTER: Primary | ICD-10-CM

## 2019-10-24 DIAGNOSIS — M87.051 AVASCULAR NECROSIS OF BONE OF RIGHT HIP (HCC): ICD-10-CM

## 2019-10-24 DIAGNOSIS — Z23 NEED FOR PROPHYLACTIC VACCINATION AND INOCULATION AGAINST INFLUENZA: ICD-10-CM

## 2019-10-24 DIAGNOSIS — S80.02XA CONTUSION OF LEFT KNEE AND LOWER LEG, INITIAL ENCOUNTER: Primary | ICD-10-CM

## 2019-10-24 PROCEDURE — 1123F ACP DISCUSS/DSCN MKR DOCD: CPT | Performed by: FAMILY MEDICINE

## 2019-10-24 PROCEDURE — 99213 OFFICE O/P EST LOW 20 MIN: CPT | Performed by: FAMILY MEDICINE

## 2019-10-24 PROCEDURE — 1090F PRES/ABSN URINE INCON ASSESS: CPT | Performed by: FAMILY MEDICINE

## 2019-10-24 PROCEDURE — 90653 IIV ADJUVANT VACCINE IM: CPT | Performed by: FAMILY MEDICINE

## 2019-10-24 PROCEDURE — G8427 DOCREV CUR MEDS BY ELIG CLIN: HCPCS | Performed by: FAMILY MEDICINE

## 2019-10-24 PROCEDURE — G8420 CALC BMI NORM PARAMETERS: HCPCS | Performed by: FAMILY MEDICINE

## 2019-10-24 PROCEDURE — G0008 ADMIN INFLUENZA VIRUS VAC: HCPCS | Performed by: FAMILY MEDICINE

## 2019-10-24 PROCEDURE — 4040F PNEUMOC VAC/ADMIN/RCVD: CPT | Performed by: FAMILY MEDICINE

## 2019-10-24 PROCEDURE — G8399 PT W/DXA RESULTS DOCUMENT: HCPCS | Performed by: FAMILY MEDICINE

## 2019-10-24 PROCEDURE — 1036F TOBACCO NON-USER: CPT | Performed by: FAMILY MEDICINE

## 2019-10-24 PROCEDURE — G8482 FLU IMMUNIZE ORDER/ADMIN: HCPCS | Performed by: FAMILY MEDICINE

## 2019-10-24 ASSESSMENT — ENCOUNTER SYMPTOMS
NAUSEA: 0
DIARRHEA: 0
SHORTNESS OF BREATH: 0
VOMITING: 0

## 2019-10-25 ENCOUNTER — OFFICE VISIT (OUTPATIENT)
Dept: PAIN MANAGEMENT | Age: 79
End: 2019-10-25
Payer: MEDICARE

## 2019-10-25 VITALS
RESPIRATION RATE: 16 BRPM | DIASTOLIC BLOOD PRESSURE: 76 MMHG | TEMPERATURE: 98.1 F | SYSTOLIC BLOOD PRESSURE: 138 MMHG | HEART RATE: 84 BPM

## 2019-10-25 DIAGNOSIS — M16.11 PRIMARY OSTEOARTHRITIS OF RIGHT HIP: ICD-10-CM

## 2019-10-25 DIAGNOSIS — M48.061 SPINAL STENOSIS OF LUMBAR REGION WITHOUT NEUROGENIC CLAUDICATION: Primary | ICD-10-CM

## 2019-10-25 DIAGNOSIS — M54.16 LUMBAR RADICULOPATHY: ICD-10-CM

## 2019-10-25 DIAGNOSIS — M51.9 LUMBAR DISC DISORDER: ICD-10-CM

## 2019-10-25 DIAGNOSIS — M47.816 LUMBAR FACET ARTHROPATHY: ICD-10-CM

## 2019-10-25 DIAGNOSIS — G89.4 CHRONIC PAIN SYNDROME: ICD-10-CM

## 2019-10-25 DIAGNOSIS — M87.051 AVASCULAR NECROSIS OF BONE OF RIGHT HIP (HCC): ICD-10-CM

## 2019-10-25 DIAGNOSIS — M53.3 DISORDER OF SACRUM: ICD-10-CM

## 2019-10-25 PROCEDURE — G8482 FLU IMMUNIZE ORDER/ADMIN: HCPCS | Performed by: PAIN MEDICINE

## 2019-10-25 PROCEDURE — 1036F TOBACCO NON-USER: CPT | Performed by: PAIN MEDICINE

## 2019-10-25 PROCEDURE — G8399 PT W/DXA RESULTS DOCUMENT: HCPCS | Performed by: PAIN MEDICINE

## 2019-10-25 PROCEDURE — G8427 DOCREV CUR MEDS BY ELIG CLIN: HCPCS | Performed by: PAIN MEDICINE

## 2019-10-25 PROCEDURE — 97113 AQUATIC THERAPY/EXERCISES: CPT | Performed by: PAIN MEDICINE

## 2019-10-25 PROCEDURE — G8420 CALC BMI NORM PARAMETERS: HCPCS | Performed by: PAIN MEDICINE

## 2019-10-25 PROCEDURE — 1090F PRES/ABSN URINE INCON ASSESS: CPT | Performed by: PAIN MEDICINE

## 2019-10-25 PROCEDURE — 99214 OFFICE O/P EST MOD 30 MIN: CPT | Performed by: PAIN MEDICINE

## 2019-10-25 PROCEDURE — 4040F PNEUMOC VAC/ADMIN/RCVD: CPT | Performed by: PAIN MEDICINE

## 2019-10-25 PROCEDURE — 1123F ACP DISCUSS/DSCN MKR DOCD: CPT | Performed by: PAIN MEDICINE

## 2019-10-25 PROCEDURE — 99213 OFFICE O/P EST LOW 20 MIN: CPT

## 2019-12-12 ENCOUNTER — TELEPHONE (OUTPATIENT)
Dept: FAMILY MEDICINE CLINIC | Age: 79
End: 2019-12-12

## 2019-12-13 ENCOUNTER — OFFICE VISIT (OUTPATIENT)
Dept: PAIN MANAGEMENT | Age: 79
End: 2019-12-13
Payer: MEDICARE

## 2019-12-13 VITALS
HEART RATE: 90 BPM | OXYGEN SATURATION: 96 % | HEIGHT: 64 IN | TEMPERATURE: 98.2 F | BODY MASS INDEX: 23.05 KG/M2 | WEIGHT: 135 LBS | SYSTOLIC BLOOD PRESSURE: 138 MMHG | DIASTOLIC BLOOD PRESSURE: 78 MMHG | RESPIRATION RATE: 16 BRPM

## 2019-12-13 DIAGNOSIS — M54.16 LUMBAR RADICULOPATHY: ICD-10-CM

## 2019-12-13 DIAGNOSIS — M53.3 DISORDER OF SACRUM: ICD-10-CM

## 2019-12-13 DIAGNOSIS — G89.4 CHRONIC PAIN SYNDROME: ICD-10-CM

## 2019-12-13 DIAGNOSIS — M87.051 AVASCULAR NECROSIS OF BONE OF RIGHT HIP (HCC): ICD-10-CM

## 2019-12-13 DIAGNOSIS — M47.816 LUMBAR FACET ARTHROPATHY: ICD-10-CM

## 2019-12-13 DIAGNOSIS — M48.061 SPINAL STENOSIS OF LUMBAR REGION WITHOUT NEUROGENIC CLAUDICATION: ICD-10-CM

## 2019-12-13 DIAGNOSIS — M51.9 LUMBAR DISC DISORDER: ICD-10-CM

## 2019-12-13 DIAGNOSIS — M16.11 PRIMARY OSTEOARTHRITIS OF RIGHT HIP: Primary | ICD-10-CM

## 2019-12-13 PROCEDURE — 1090F PRES/ABSN URINE INCON ASSESS: CPT | Performed by: PAIN MEDICINE

## 2019-12-13 PROCEDURE — 1123F ACP DISCUSS/DSCN MKR DOCD: CPT | Performed by: PAIN MEDICINE

## 2019-12-13 PROCEDURE — 1036F TOBACCO NON-USER: CPT | Performed by: PAIN MEDICINE

## 2019-12-13 PROCEDURE — G8427 DOCREV CUR MEDS BY ELIG CLIN: HCPCS | Performed by: PAIN MEDICINE

## 2019-12-13 PROCEDURE — 99213 OFFICE O/P EST LOW 20 MIN: CPT | Performed by: PAIN MEDICINE

## 2019-12-13 PROCEDURE — 4040F PNEUMOC VAC/ADMIN/RCVD: CPT | Performed by: PAIN MEDICINE

## 2019-12-13 PROCEDURE — G8420 CALC BMI NORM PARAMETERS: HCPCS | Performed by: PAIN MEDICINE

## 2019-12-13 PROCEDURE — G8482 FLU IMMUNIZE ORDER/ADMIN: HCPCS | Performed by: PAIN MEDICINE

## 2019-12-13 PROCEDURE — G8399 PT W/DXA RESULTS DOCUMENT: HCPCS | Performed by: PAIN MEDICINE

## 2019-12-13 RX ORDER — ACETAMINOPHEN AND CODEINE PHOSPHATE 300; 30 MG/1; MG/1
1 TABLET ORAL 2 TIMES DAILY PRN
Qty: 60 TABLET | Refills: 0 | Status: SHIPPED | OUTPATIENT
Start: 2019-12-13 | End: 2020-01-12

## 2019-12-25 ASSESSMENT — ENCOUNTER SYMPTOMS
SINUS PRESSURE: 0
NAUSEA: 0
EYE PAIN: 0
EYE REDNESS: 0
SORE THROAT: 0
DIARRHEA: 0
VOMITING: 0
SHORTNESS OF BREATH: 0
COUGH: 0
EYE DISCHARGE: 0
ABDOMINAL PAIN: 0

## 2019-12-26 ENCOUNTER — OFFICE VISIT (OUTPATIENT)
Dept: FAMILY MEDICINE CLINIC | Age: 79
End: 2019-12-26
Payer: MEDICARE

## 2019-12-26 VITALS
HEART RATE: 78 BPM | SYSTOLIC BLOOD PRESSURE: 160 MMHG | WEIGHT: 134 LBS | HEIGHT: 64 IN | BODY MASS INDEX: 22.88 KG/M2 | OXYGEN SATURATION: 96 % | DIASTOLIC BLOOD PRESSURE: 64 MMHG

## 2019-12-26 DIAGNOSIS — G40.209 PARTIAL SYMPTOMATIC EPILEPSY WITH COMPLEX PARTIAL SEIZURES, NOT INTRACTABLE, WITHOUT STATUS EPILEPTICUS (HCC): ICD-10-CM

## 2019-12-26 DIAGNOSIS — F41.9 ANXIETY DISORDER, UNSPECIFIED TYPE: ICD-10-CM

## 2019-12-26 DIAGNOSIS — Z01.818 PRE-OPERATIVE CLEARANCE: Primary | ICD-10-CM

## 2019-12-26 DIAGNOSIS — Z23 NEED FOR TETANUS BOOSTER: ICD-10-CM

## 2019-12-26 PROCEDURE — 1036F TOBACCO NON-USER: CPT | Performed by: PHYSICIAN ASSISTANT

## 2019-12-26 PROCEDURE — 4040F PNEUMOC VAC/ADMIN/RCVD: CPT | Performed by: PHYSICIAN ASSISTANT

## 2019-12-26 PROCEDURE — G8420 CALC BMI NORM PARAMETERS: HCPCS | Performed by: PHYSICIAN ASSISTANT

## 2019-12-26 PROCEDURE — G8399 PT W/DXA RESULTS DOCUMENT: HCPCS | Performed by: PHYSICIAN ASSISTANT

## 2019-12-26 PROCEDURE — 1090F PRES/ABSN URINE INCON ASSESS: CPT | Performed by: PHYSICIAN ASSISTANT

## 2019-12-26 PROCEDURE — 99214 OFFICE O/P EST MOD 30 MIN: CPT | Performed by: PHYSICIAN ASSISTANT

## 2019-12-26 PROCEDURE — G8482 FLU IMMUNIZE ORDER/ADMIN: HCPCS | Performed by: PHYSICIAN ASSISTANT

## 2019-12-26 PROCEDURE — 93000 ELECTROCARDIOGRAM COMPLETE: CPT | Performed by: PHYSICIAN ASSISTANT

## 2019-12-26 PROCEDURE — 1123F ACP DISCUSS/DSCN MKR DOCD: CPT | Performed by: PHYSICIAN ASSISTANT

## 2019-12-26 PROCEDURE — G8427 DOCREV CUR MEDS BY ELIG CLIN: HCPCS | Performed by: PHYSICIAN ASSISTANT

## 2019-12-26 RX ORDER — DIAZEPAM 5 MG/1
5 TABLET ORAL NIGHTLY PRN
Qty: 30 TABLET | Refills: 0 | Status: SHIPPED
Start: 2019-12-26 | End: 2020-05-27 | Stop reason: SDUPTHER

## 2019-12-26 RX ORDER — LAMOTRIGINE 100 MG/1
100 TABLET ORAL 3 TIMES DAILY
Qty: 90 TABLET | Refills: 0 | Status: SHIPPED | OUTPATIENT
Start: 2019-12-26

## 2019-12-26 ASSESSMENT — ENCOUNTER SYMPTOMS
BACK PAIN: 1
WHEEZING: 0
CHEST TIGHTNESS: 0

## 2020-01-02 ENCOUNTER — OFFICE VISIT (OUTPATIENT)
Dept: FAMILY MEDICINE CLINIC | Age: 80
End: 2020-01-02
Payer: MEDICARE

## 2020-01-02 VITALS
HEIGHT: 64 IN | HEART RATE: 73 BPM | WEIGHT: 131 LBS | DIASTOLIC BLOOD PRESSURE: 84 MMHG | OXYGEN SATURATION: 98 % | SYSTOLIC BLOOD PRESSURE: 150 MMHG | BODY MASS INDEX: 22.36 KG/M2

## 2020-01-02 PROCEDURE — G8482 FLU IMMUNIZE ORDER/ADMIN: HCPCS | Performed by: FAMILY MEDICINE

## 2020-01-02 PROCEDURE — 4040F PNEUMOC VAC/ADMIN/RCVD: CPT | Performed by: FAMILY MEDICINE

## 2020-01-02 PROCEDURE — 1123F ACP DISCUSS/DSCN MKR DOCD: CPT | Performed by: FAMILY MEDICINE

## 2020-01-02 PROCEDURE — G0439 PPPS, SUBSEQ VISIT: HCPCS | Performed by: FAMILY MEDICINE

## 2020-01-02 ASSESSMENT — PATIENT HEALTH QUESTIONNAIRE - PHQ9
SUM OF ALL RESPONSES TO PHQ QUESTIONS 1-9: 0
SUM OF ALL RESPONSES TO PHQ QUESTIONS 1-9: 0

## 2020-01-02 NOTE — PROGRESS NOTES
Medicare Annual Wellness Visit  Name: Dieudonne Vásquez Date: 2020   MRN: <[id number]> Sex: Female   Age: 67 y.o. 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 Will be done at [facility] by Dr. Kaitlin Hill from [facility]. Patient doing well on current regimen for epilepsy. Follows with neurology. Screenings for behavioral, psychosocial and functional/safety risks, and cognitive dysfunction are all negative except as indicated below. These results, as well as other patient data from the [address] form, are documented in Flowsheets linked to this Encounter. Allergies   Allergen Reactions    Penicillins Anaphylaxis and Swelling    Dextromethorphan Hives    Factive [Gemifloxacin Mesylate]     Food Hives     shrimp    Mucinex Dm [Dm-Guaifenesin Er] Hives    Other      THORPHAN    Shellfish-Derived Products Swelling         Prior to Visit Medications    Medication Sig Taking? Authorizing Provider   diazepam (VALIUM) 5 MG tablet Take 1 tablet by mouth nightly as needed for Sleep for up to 30 days. Yes Luis Manuel Cabral PA-C   lamoTRIgine (LAMICTAL) 100 MG tablet Take 1 tablet by mouth 3 times daily Yes Luis Manuel Cabral PA-C   Acetaminophen (TYLENOL ARTHRITIS PAIN PO) Take by mouth Yes Historical Provider, MD   acetaminophen-codeine (TYLENOL/CODEINE #3) 300-30 MG per tablet Take 1 tablet by mouth 2 times daily as needed for Pain for up to 30 days. Yes Halley Galvez MD   primidone (MYSOLINE) 250 MG tablet Take 250 mg by mouth 3 times daily. Historical Provider, MD         Past Medical History:   Diagnosis Date    Diverticulosis     Hemorrhoids, internal     Irritable bowel syndrome     Osteoarthritis     rt knee    Osteoporosis     Se[address] ([facility] [address]     complex partial  last seizure ?     Urticaria, chronic        Past Surgical History:   Procedure Laterality Date    APPENDECTOMY      CARPAL TUNNEL RELEASE Left     CHOLECYSTECTOMY      COLONOSCOPY      FINGER TRIGGER RELEASE Left     HC INJECT OTHER PERPHRL NERV Right 10/8/2019    RIGHT HIP INJECTION (CPT 87672) performed by Reshma Bush MD at [facility] Right 9/19/2019    RIGHT SACROILIAC JOINT INJECTION performed by Reshma Bush MD at [address] Right 10/31/2014    Laparoscopic right inguinal hernia repair with mesh     HYSTERECTOMY      vaginal    KNEE ARTHROSCOPY      right    NERVE BLOCK Right 09/19/2019    sacroiliac joint    NERVE BLOCK Right 10/08/2019    hip injection    TONSILLECTOMY AND ADENOIDECTOMY           Family History   Problem Relation Age of Onset    Coronary Art Dis Mother     Coronary Art Dis Father     Coronary Art Dis Brother     Cancer Brother         lymph nodes and lung    Diabetes Maternal Aunt        CareTeam (Including outside providers/suppliers regularly involved in providing care):   Patient Care Team:  Shaun Bautista MD as PCP - General  Shaun Bautista MD as PCP - [facility] Empaneled Provider  Saulo Trammell MD as Referring Physician  Agatha Carrel, MD as Consulting Physician (Neurology)  Jessica Oakley MD as Consulting Physician (Cardiology)  Yolanda Gomez MD as Referring Physician (Pain Management)    Wt Readings from Last 3 Encounters:   01/02/20 131 lb (59.4 kg)   12/26/19 134 lb (60.8 kg)   12/13/19 135 lb (61.2 kg)     Vitals:    01/02/20 1428 01/02/20 1436   BP: (!) 150/84 (!) 150/84   Pulse: 73    SpO2: 98%    Weight: 131 lb (59.4 kg)    Height: 5' 4\" (1.626 m)      Body mass index is 22.49 kg/m². Based upon direct observation of the patient, evaluation of cognition reveals recent and remote memory intact. Physical Exam  Vitals signs reviewed. Constitutional:       Appearance: She is well-developed.    Cardiovascular:      Rate and Rhythm: Normal rate and regular rhythm. Heart sounds: Normal heart sounds. No murmur. No friction rub. Pulmonary:      Effort: Pulmonary effort is normal. No respiratory distress. Breath sounds: Normal breath sounds. No wheezing or rales. Abdominal:      General: Bowel sounds are normal. There is no distension. Palpations: Abdomen is soft. Tenderness: There is no tenderness. There is no guarding or rebound. Skin:     General: Skin is warm and dry. Neurological:      Mental Status: She is alert and oriented to person, place, and time. Patient's complete Health Risk Assessment and screening values have been reviewed and are found in Flowsheets. The following problems were reviewed today and where indicated follow up appointments were made and/or referrals ordered. Positive Risk Factor Screenings with Interventions:     Fall Risk:  Timed Up and Go Test > 12 seconds? (Complete if either Fall Risk answers are Yes): no  2 or more falls in past year?: (!) yes  Fall with injury in past year?: (!) yes  Fall Risk Interventions:    · Home safety tips provided    General Health:  General  In general, how would you say your health is?: Good  In the past 7 days, have you experienced any of the following?  New or Increased Pain, New or Increased Fatigue, Loneliness, Social Isolation, Stress or Anger?: (!) New or Increased Pain  Do you get the social and emotional support that you need?: Yes  Do you have a Living Will?: Yes  General Health Risk Interventions:  · Pain issues: will have right hip replacement    Personalized Preventive Plan   Current Health Maintenance Status  Immunization History   Administered Date(s) Administered    Influenza, High Dose (Fluzone 65 yrs and older) 09/26/2017, 09/24/2018    Influenza, Triv, inactivated, subunit, adjuvanted, IM (Fluad 65 yrs and older) 10/24/2019    Pneumococcal Conjugate 13-valent ([id number]) 03/23/2017    Pneumococcal Polysaccharide

## 2020-01-02 NOTE — PATIENT INSTRUCTIONS
Personalized Preventive Plan for Geoff Pear - 1/2/2020  Medicare offers a range of preventive health benefits. Some of the tests and screenings are paid in full while other may be subject to a deductible, co-insurance, and/or copay. Some of these benefits include a comprehensive review of your medical history including lifestyle, illnesses that may run in your family, and various assessments and screenings as appropriate. After reviewing your medical record and screening and assessments performed today your provider may have ordered immunizations, labs, imaging, and/or referrals for you. A list of these orders (if applicable) as well as your Preventive Care list are included within your After Visit Summary for your review. Other Preventive Recommendations:    · A preventive eye exam performed by an eye specialist is recommended every 1-2 years to screen for glaucoma; cataracts, macular degeneration, and other eye disorders. · A preventive dental visit is recommended every 6 months. · Try to get at least 150 minutes of exercise per week or 10,000 steps per day on a pedometer . · Order or download the FREE \"Exercise & Physical Activity: Your Everyday Guide\" from [employer]. Call 6-140.500.1233 or search [employer] online. · You need 1016-7912 mg of calcium and 4828-3679 IU of vitamin D per day. It is possible to meet your calcium requirement with diet alone, but a vitamin D supplement is usually necessary to meet this goal.  · When exposed to the sun, use a sunscreen that protects against both UVA and UVB radiation with an SPF of 30 or greater. Reapply every 2 to 3 hours or after sweating, drying off with a towel, or swimming. · Always wear a seat belt when traveling in a car. Always wear a helmet when riding a bicycle or motorcycle. Heart-Healthy Diet   Sodium, Fat, and Cholesterol Controlled Diet       What Is a Heart Healthy Diet?    A heart-healthy diet is one that limits sodium , certain types of fat , and cholesterol . This type of diet is recommended for:   People with any form of cardiovascular disease (eg, coronary heart disease , peripheral vascular disease , previous heart attack , previous stroke )   People with risk factors for cardiovascular disease, such as high blood pressure , high cholesterol , or diabetes   Anyone who wants to lower their risk of developing cardiovascular disease   Sodium    Sodium is a mineral found in many foods. In general, most people consume much more sodium than they need. Diets high in sodium can increase blood pressure and lead to edema (water retention). On a heart-healthy diet, you should consume no more than 2,300 mg (milligrams) of sodium per dayabout the amount in one teaspoon of table salt. The foods highest in sodium include table salt (about 50% sodium), processed foods, convenience foods, and preserved foods. Cholesterol    Cholesterol is a fat-like, waxy substance in your blood. Our bodies make some cholesterol. It is also found in animal products, with the highest amounts in fatty meat, egg yolks, whole milk, cheese, shellfish, and organ meats. On a heart-healthy diet, you should limit your cholesterol intake to less than 200 mg per day. It is normal and important to have some cholesterol in your bloodstream. But too much cholesterol can cause plaque to build up within your arteries, which can eventually lead to a heart attack or stroke. The two types of cholesterol that are most commonly referred to are:   Low-density lipoprotein (LDL) cholesterol  Also known as bad cholesterol, this is the cholesterol that tends to build up along your arteries. Bad cholesterol levels are increased by eating fats that are saturated or hydrogenated. Optimal level of this cholesterol is less than 100. Over 130 starts to get risky for heart disease.    High-density lipoprotein (HDL) cholesterol  Also known as good cholesterol, this type of cholesterol actually carries cholesterol away from your arteries and may, therefore, help lower your risk of having a heart attack. You want this level to be high (ideally greater than 60). It is a risk to have a level less than 40. You can raise this good cholesterol by eating olive oil, canola oil, avocados, or nuts. Exercise raises this level, too. Fat    Fat is calorie dense and packs a lot of calories into a small amount of food. Even though fats should be limited due to their high calorie content, not all fats are bad. In fact, some fats are quite healthful. Fat can be broken down into four main types. The good-for-you fats are:   Monounsaturated fat  found in oils such as olive and canola, avocados, and nuts and natural nut butters; can decrease cholesterol levels, while keeping levels of HDL cholesterol high   Polyunsaturated fat  found in oils such as safflower, sunflower, soybean, corn, and sesame; can decrease total cholesterol and LDL cholesterol   Omega-3 fatty acids  particularly those found in fatty fish (such as salmon, trout, tuna, mackerel, herring, and sardines); can decrease risk of arrhythmias, decrease triglyceride levels, and slightly lower blood pressure   The fats that you want to limit are:   Saturated fat  found in animal products, many fast foods, and a few vegetables; increases total blood cholesterol, including LDL levels   Animal fats that are saturated include: butter, lard, whole-milk dairy products, meat fat, and poultry skin   Vegetable fats that are saturated include: hydrogenated shortening, palm oil, coconut oil, cocoa butter   Hydrogenated or trans fat  found in margarine and vegetable shortening, most shelf stable snack foods, and fried foods; increases LDL and decreases HDL     It is generally recommended that you limit your total fat for the day to less than 30% of your total calories.  If you follow an 1800-calorie heart healthy diet, for example, this would mean 60 grams of fat or less per day. Saturated fat and trans fat in your diet raises your blood cholesterol the most, much more than dietary cholesterol does. For this reason, on a heart-healthy diet, less than 7% of your calories should come from saturated fat and ideally 0% from trans fat. On an 1800-calorie diet, this translates into less than 14 grams of saturated fat per day, leaving 46 grams of fat to come from mono- and polyunsaturated fats.    Food Choices on a Heart Healthy Diet   Food Category   Foods Recommended   Foods to Avoid   Grains   Breads and rolls without salted tops Most dry and cooked cereals Unsalted crackers and breadsticks Low-sodium or homemade breadcrumbs or stuffing All rice and pastas   Breads, rolls, and crackers with salted tops High-fat baked goods (eg, muffins, donuts, pastries) Quick breads, self-rising flour, and biscuit mixes Regular bread crumbs Instant hot cereals Commercially prepared rice, pasta, or stuffing mixes   Vegetables   Most fresh, frozen, and low-sodium canned vegetables Low-sodium and salt-free vegetable juices Canned vegetables if unsalted or rinsed   Regular canned vegetables and juices, including sauerkraut and pickled vegetables Frozen vegetables with sauces Commercially prepared potato and vegetable mixes   Fruits   Most fresh, frozen, and canned fruits All fruit juices   Fruits processed with salt or sodium   Milk   Nonfat or low-fat (1%) milk Nonfat or low-fat yogurt Cottage cheese, low-fat ricotta, cheeses labeled as low-fat and low-sodium   Whole milk Reduced-fat (2%) milk Malted and chocolate milk Full fat yogurt Most cheeses (unless low-fat and low salt) Buttermilk (no more than 1 cup per week)   Meats and Beans   Lean cuts of fresh or frozen beef, veal, lamb, or pork (look for the word loin) Fresh or frozen poultry without the skin Fresh or frozen fish and some shellfish Egg whites and egg substitutes (Limit whole eggs to three per week) Tofu Nuts or seeds (unsalted, dry-roasted), low-sodium peanut butter Dried peas, beans, and lentils   Any smoked, cured, salted, or canned meat, fish, or poultry (including gunn, chipped beef, cold cuts, hot dogs, sausages, sardines, and anchovies) Poultry skins Breaded and/or fried fish or meats Canned peas, beans, and lentils Salted nuts   Fats and Oils   Olive oil and canola oil Low-sodium, low-fat salad dressings and mayonnaise   Butter, margarine, coconut and palm oils, gunn fat   Snacks, Sweets, and Condiments   Low-sodium or unsalted versions of broths, soups, soy sauce, and condiments Pepper, herbs, and spices; vinegar, lemon, or lime juice Low-fat frozen desserts (yogurt, sherbet, fruit bars) Sugar, cocoa powder, honey, syrup, jam, and preserves Low-fat, trans-fat free cookies, cakes, and pies Randolph and animal crackers, fig bars, seema snaps   High-fat desserts Broth, soups, gravies, and sauces, made from instant mixes or other high-sodium ingredients Salted snack foods Canned olives Meat tenderizers, seasoning salt, and most flavored vinegars   Beverages   Low-sodium carbonated beverages Tea and coffee in moderation Soy milk   Commercially softened water   Suggestions   Make whole grains, fruits, and vegetables the base of your diet. Choose heart-healthy fats such as canola, olive, and flaxseed oil, and foods high in heart-healthy fats, such as nuts, seeds, soybeans, tofu, and fish. Eat fish at least twice per week; the fish highest in omega-3 fatty acids and lowest in mercury include salmon, herring, mackerel, sardines, and canned chunk light tuna. If you eat fish less than twice per week or have high triglycerides, talk to your doctor about taking fish oil supplements. Read food labels.    For products low in fat and cholesterol, look for fat free, low-fat, cholesterol free, saturated fat free, and trans fat freeAlso scan the Nutrition Facts Label, which lists saturated fat, trans fat, memory aids can help:   Calendars and day planners   Electronic organizers to store all sorts of helpful informationThese devices can \"beep\" to remind you of appointments. A book of days to record birthdays, anniversaries, and other occasions that occur on the same date every year   Detailed \"to-do\" lists and strategically placed sticky notes   Quick \"study\" sessionsBefore a gathering, review who will be there so their names will be fresh in your mind. Establish routinesFor example, keep your keys, wallet, and umbrella in the same place all the time or take medicine with your 8:00 AM glass of juice   Live a Healthy Life   Many actions that will keep your body strong will do the same for your mind. For example:   Talk to Your Doctor About Herbs and Supplements    Malnutrition and vitamin deficiencies can impair your mental function. For example, vitamin B12 deficiency can cause a range of symptoms, including confusion. But, what if your nutritional needs are being met? Can herbs and supplements still offer a benefit? Researchers have investigated a range of natural remedies, such as ginkgo , ginseng , and the supplement phosphatidylserine (PS). So far, though, the evidence is inconsistent as to whether these products can improve memory or thinking. If you are interested in taking herbs and supplements, talk to your doctor first because they may interact with other medicines that you are taking. Exercise Regularly    Among the many benefits of regular exercise are increased blood flow to the brain and decreased risk of certain diseases that can interfere with memory function. One study found that even moderate exercise has a beneficial effect. Examples of \"moderate\" exercise include:   Playing 18 holes of golf once a week, without a cart   Playing tennis twice a week   Walking one mile per day   Manage Stress    It can be tough to remember what is important when your mind is cluttered.  Make time for relaxation. Choose activities that calm you down, and make it routine. Manage Chronic Conditions    Side effects of high blood pressure , diabetes, and heart disease can interfere with mental function. Many of the lifestyle steps discussed here can help manage these conditions. Strive to eat a healthy diet, exercise regularly, get stress under control, and follow your doctor's advice for your condition. Minimize Medications    Talk to your doctor about the medicines that you take. Some may be unnecessary. Also, healthy lifestyle habits may lower the need for certain drugs. Last Reviewed: April 2010 Riva Hashimoto, MD   Updated: 4/13/2010   ·     [address]       As we get older, changes in balance, gait, strength, vision, hearing, and cognition make even the most youthful senior more prone to accidents. Falls are one of the leading health risks for older people. This increased risk of falling is related to:   Aging process (eg, decreased muscle strength, slowed reflexes)   Higher incidence of chronic health problems (eg, arthritis, diabetes) that may limit mobility, agility or sensory awareness   Side effects of medicine (eg, dizziness, blurred vision)especially medicines like prescription pain medicines and drugs used to treat mental health conditions   Depending on the brittleness of your bones, the consequences of a fall can be serious and long lasting. Home Life   Research by the Association of Aging [facility]) shows that some home accidents among older adults can be prevented by making simple lifestyle changes and basic modifications and repairs to the home environment. Here are some lifestyle changes that experts recommend:   Have your hearing and vision checked regularly. Be sure to wear prescription glasses that are right for you. Speak to your doctor or pharmacist about the possible side effects of your medicines. A number of medicines can cause dizziness.    If you have problems with wood floors can be particularly slippery. Stairs should always have handrails and be carpeted or fitted with a non-skid tread. Is your telephone easily reachable. Is the cord safely tucked away? Room by Room   According to the Association of Aging, bathrooms and brown are the two most potentially hazardous rooms in your home. In the Kitchen    Be sure your stove is in proper working order and always make sure burners and the oven are off before you go out or go to sleep. Keep pots on the back burners, turn handles away from the front of the stove, and keep stove clean and free of grease build-up. Kitchen ventilation systems and range exhausts should be working properly. Keep flammable objects such as towels and pot holders away from the cooking area except when in use. Make sure kitchen curtains are tied back. Move cords and appliances away from the sink and hot surfaces. If extension cords are needed, install wiring guides so they do not hang over the sink, range, or working areas. Look for coffee pots, kettles and toaster ovens with automatic shut-offs. Keep a mop handy in the kitchen so you can wipe up spills instantly. You should also have a small fire extinguisher. Arrange your kitchen with frequently used items on lower shelves to avoid the need to stand on a stepstool to reach them. Make sure countertops are well-lit to avoid injuries while cutting and preparing food. In the Bathroom    Use a non-slip mat or decals in the tub and shower, since wet, soapy tile or porcelain surfaces are extremely slippery. Make sure bathroom rugs are non-skid or tape them firmly to the floor. Bathtubs should have at least one, preferably two, grab bars, firmly attached to structural supports in the wall. (Do not use built-in soap holders or glass shower doors as grab bars.)    Tub seats fitted with non-slip material on the legs allow you to wash sitting down.  For people with limited mobility, bathtub transfer benches allow you to slide safely into the tub. Raised toilet seats and toilet safety rails are helpful for those with knee or hip problems. In the [facility]    Make sure you use a nightlight and that the area around your bed is clear of potential obstacles. Be careful with electric blankets and never go to sleep with a heating pad, which can cause serious burns even if on a low setting. Use fire-resistant mattress covers and pillows, and NEVER smoke in bed. Keep a phone next to the bed that is programmed to dial 911 at the push of a button. If you have a chronic condition, you may want to sign on with an automatic call-in service. Typically the system includes a small pendant that connects directly to an emergency medical voice-response system. You should also make arrangements to stay in contact with someonefriend, neighbor, family memberon a regular schedule. Fire Prevention   According to the [employer]. (Smoke Alarms for Every) [address] [place] [address], senior citizens are one of the two highest risk groups for death and serious injuries due to residential fires. When cooking, wear short-sleeved items, never a bulky long-sleeved robe. The Psychiatric's Safety Checklist for Older Consumers emphasizes the importance of checking basements, garages, workshops and storage areas for fire hazards, such as volatile liquids, piles of old rags or clothing and overloaded circuits. Never smoke in bed or when lying down on a couch or recliner chair. Small portable electric or kerosene heaters are responsible for many home fires and should be used cautiously if at all. If you do use one, be sure to keep them away from flammable materials. In case of fire, make sure you have a pre-established emergency exit plan. Have a professional check your fireplace and other fuel-burning appliances yearly.     Helping Hands   Baby boomers entering the valle years will continue to see else?  Would you prefer to be buried or cremated? Do you want your organs to be donated after you die? What should you do with your living will? Make sure that your family members and your health care agent have copies of your living will. Give your doctor a copy of your living will to keep in your medical record. If you have more than one doctor, make sure that each one has a copy. You may want to put a copy of your living will where it can be easily found. Where can you learn more? Go to [email or web address] [email or web address]. org and sign in to your [employer] account. Enter G628 in the [employer] box to learn more about \"Learning About Living Perroy. \"     If you do not have an account, please click on the \"Sign Up Now\" link. Current as of: April 1, 2019  Content Version: 12.1  © 8282-9340 Health[employer], [employer]. Care instructions adapted under license by [facility] ([facility]). If you have questions about a medical condition or this instruction, always ask your healthcare professional. [address] any warranty or liability for your use of this information.     ·

## 2020-01-23 ENCOUNTER — APPOINTMENT (OUTPATIENT)
Dept: CT IMAGING | Age: 80
End: 2020-01-23
Payer: MEDICARE

## 2020-01-23 ENCOUNTER — APPOINTMENT (OUTPATIENT)
Dept: GENERAL RADIOLOGY | Age: 80
End: 2020-01-23
Payer: MEDICARE

## 2020-01-23 ENCOUNTER — HOSPITAL ENCOUNTER (EMERGENCY)
Age: 80
Discharge: HOME | End: 2020-01-23
Attending: EMERGENCY MEDICINE
Payer: MEDICARE

## 2020-01-23 ENCOUNTER — TELEPHONE (OUTPATIENT)
Dept: FAMILY MEDICINE CLINIC | Age: 80
End: 2020-01-23

## 2020-01-23 VITALS
WEIGHT: 130 LBS | HEIGHT: 64 IN | SYSTOLIC BLOOD PRESSURE: 152 MMHG | BODY MASS INDEX: 22.2 KG/M2 | OXYGEN SATURATION: 97 % | DIASTOLIC BLOOD PRESSURE: 69 MMHG | TEMPERATURE: 98.4 F | HEART RATE: 73 BPM | RESPIRATION RATE: 17 BRPM

## 2020-01-23 LAB
ALBUMIN SERPL-MCNC: 4 G/DL (ref 3.5–5.2)
ALP BLD-CCNC: 94 U/L (ref 35–104)
ALT SERPL-CCNC: 25 U/L (ref 0–32)
ANION GAP SERPL CALCULATED.3IONS-SCNC: 11 MMOL/L (ref 7–16)
APTT: 30.4 SEC (ref 24.5–35.1)
AST SERPL-CCNC: 36 U/L (ref 0–31)
BASOPHILS ABSOLUTE: 0.01 E9/L (ref 0–0.2)
BASOPHILS RELATIVE PERCENT: 0.1 % (ref 0–2)
BILIRUB SERPL-MCNC: 0.3 MG/DL (ref 0–1.2)
BUN BLDV-MCNC: 9 MG/DL (ref 8–23)
CALCIUM SERPL-MCNC: 8.8 MG/DL (ref 8.6–10.2)
CHLORIDE BLD-SCNC: 92 MMOL/L (ref 98–107)
CO2: 32 MMOL/L (ref 22–29)
CREAT SERPL-MCNC: 0.6 MG/DL (ref 0.5–1)
EOSINOPHILS ABSOLUTE: 0.07 E9/L (ref 0.05–0.5)
EOSINOPHILS RELATIVE PERCENT: 0.8 % (ref 0–6)
GFR AFRICAN AMERICAN: >60
GFR NON-AFRICAN AMERICAN: >60 ML/MIN/1.73
GLUCOSE BLD-MCNC: 101 MG/DL (ref 74–99)
HCT VFR BLD CALC: 35.5 % (ref 34–48)
HEMOGLOBIN: 11.5 G/DL (ref 11.5–15.5)
IMMATURE GRANULOCYTES #: 0.05 E9/L
IMMATURE GRANULOCYTES %: 0.5 % (ref 0–5)
INR BLD: 1.1
LYMPHOCYTES ABSOLUTE: 1.23 E9/L (ref 1.5–4)
LYMPHOCYTES RELATIVE PERCENT: 13.3 % (ref 20–42)
MCH RBC QN AUTO: 30.2 PG (ref 26–35)
MCHC RBC AUTO-ENTMCNC: 32.4 % (ref 32–34.5)
MCV RBC AUTO: 93.2 FL (ref 80–99.9)
MONOCYTES ABSOLUTE: 0.85 E9/L (ref 0.1–0.95)
MONOCYTES RELATIVE PERCENT: 9.2 % (ref 2–12)
NEUTROPHILS ABSOLUTE: 7.07 E9/L (ref 1.8–7.3)
NEUTROPHILS RELATIVE PERCENT: 76.1 % (ref 43–80)
PDW BLD-RTO: 13.2 FL (ref 11.5–15)
PLATELET # BLD: 306 E9/L (ref 130–450)
PMV BLD AUTO: 9.1 FL (ref 7–12)
POTASSIUM SERPL-SCNC: 3.8 MMOL/L (ref 3.5–5)
PROTHROMBIN TIME: 12.4 SEC (ref 9.3–12.4)
RBC # BLD: 3.81 E12/L (ref 3.5–5.5)
SODIUM BLD-SCNC: 135 MMOL/L (ref 132–146)
TOTAL PROTEIN: 6.9 G/DL (ref 6.4–8.3)
TROPONIN: <0.01 NG/ML (ref 0–0.03)
WBC # BLD: 9.3 E9/L (ref 4.5–11.5)

## 2020-01-23 PROCEDURE — 85025 COMPLETE CBC W/AUTO DIFF WBC: CPT

## 2020-01-23 PROCEDURE — 85610 PROTHROMBIN TIME: CPT

## 2020-01-23 PROCEDURE — 85730 THROMBOPLASTIN TIME PARTIAL: CPT

## 2020-01-23 PROCEDURE — 6360000004 HC RX CONTRAST MEDICATION: Performed by: RADIOLOGY

## 2020-01-23 PROCEDURE — 70498 CT ANGIOGRAPHY NECK: CPT

## 2020-01-23 PROCEDURE — 71275 CT ANGIOGRAPHY CHEST: CPT

## 2020-01-23 PROCEDURE — 71045 X-RAY EXAM CHEST 1 VIEW: CPT

## 2020-01-23 PROCEDURE — 36415 COLL VENOUS BLD VENIPUNCTURE: CPT

## 2020-01-23 PROCEDURE — 99285 EMERGENCY DEPT VISIT HI MDM: CPT

## 2020-01-23 PROCEDURE — 93005 ELECTROCARDIOGRAM TRACING: CPT | Performed by: STUDENT IN AN ORGANIZED HEALTH CARE EDUCATION/TRAINING PROGRAM

## 2020-01-23 PROCEDURE — 84484 ASSAY OF TROPONIN QUANT: CPT

## 2020-01-23 PROCEDURE — 80053 COMPREHEN METABOLIC PANEL: CPT

## 2020-01-23 RX ORDER — DIPHENHYDRAMINE HYDROCHLORIDE 50 MG/ML
50 INJECTION INTRAMUSCULAR; INTRAVENOUS ONCE
Status: DISCONTINUED | OUTPATIENT
Start: 2020-01-23 | End: 2020-01-23

## 2020-01-23 RX ORDER — METHYLPREDNISOLONE SODIUM SUCCINATE 125 MG/2ML
125 INJECTION, POWDER, LYOPHILIZED, FOR SOLUTION INTRAMUSCULAR; INTRAVENOUS ONCE
Status: DISCONTINUED | OUTPATIENT
Start: 2020-01-23 | End: 2020-01-23

## 2020-01-23 RX ADMIN — IOPAMIDOL 80 ML: 755 INJECTION, SOLUTION INTRAVENOUS at 15:48

## 2020-01-23 ASSESSMENT — ENCOUNTER SYMPTOMS
TROUBLE SWALLOWING: 0
FACIAL SWELLING: 0
NAUSEA: 0
BACK PAIN: 0
SORE THROAT: 0
COUGH: 0
SHORTNESS OF BREATH: 0
DIARRHEA: 0
ABDOMINAL PAIN: 0
COLOR CHANGE: 0
VOMITING: 0
VOICE CHANGE: 0

## 2020-01-23 NOTE — ED PROVIDER NOTES
Constitutional:       General: She is not in acute distress. Appearance: She is well-developed and normal weight. She is not ill-appearing, toxic-appearing or diaphoretic. HENT:      Head: Normocephalic and atraumatic. Right Ear: External ear normal.      Left Ear: External ear normal.      Nose: Nose normal. No congestion or rhinorrhea. Mouth/Throat:      Pharynx: No oropharyngeal exudate or posterior oropharyngeal erythema. Eyes:      General: No visual field deficit or scleral icterus. Right eye: No discharge. Left eye: No discharge. Extraocular Movements: Extraocular movements intact. Conjunctiva/sclera: Conjunctivae normal.      Pupils: Pupils are equal, round, and reactive to light. Neck:      Musculoskeletal: Full passive range of motion without pain, normal range of motion and neck supple. Normal range of motion. No edema, erythema, neck rigidity, crepitus, injury, pain with movement, spinous process tenderness or muscular tenderness. Thyroid: No thyroid mass, thyromegaly or thyroid tenderness. Vascular: No carotid bruit or JVD. Trachea: Trachea and phonation normal. No tracheal deviation. Comments: Pulsatile mass at right lower neck  Cardiovascular:      Rate and Rhythm: Normal rate and regular rhythm. Pulses: Normal pulses. Radial pulses are 2+ on the right side and 2+ on the left side. Heart sounds: S1 normal. Heart sounds not distant. No murmur. No friction rub. No gallop. No S3 or S4 sounds. Pulmonary:      Effort: Pulmonary effort is normal. No tachypnea, accessory muscle usage or respiratory distress. Breath sounds: Normal breath sounds. No stridor, decreased air movement or transmitted upper airway sounds. No decreased breath sounds, wheezing, rhonchi or rales. Chest:      Chest wall: No tenderness. Abdominal:      General: Abdomen is flat. Bowel sounds are normal. There is no distension. the neck and chest as well as check labs and EKG. [LS]   [place]:     I have personally performed and/or participated in the history, exam, medical decision making, and procedures and agree with all pertinent clinical information unless otherwise noted. I have also reviewed and agree with the past medical, family and social history unless otherwise noted. I have discussed this patient in detail with the resident, and provided the instruction and education regarding patient here for evaluation of a pulsatile mass in the neck for the last 10 days. It is nonpainful but she can feel it pulsating she states. First noticed about 10 days ago. No preceding trauma or injury and no associated headache, neck pain or difficulty breathing or swallowing. No chest pain or shortness of breath. .  My findings/plan: Patient with no skin erythema, rashes or lesions noted to the neck. Has a visible and palpable pulsating mass to the right suprasternal notch region into the neck. No tracheal deviation or tenderness. Normal voice. No focal neurologic deficit. Heart rate regular, lungs are clear and equal.          [NC]      ED Course User Index  [LS] Donnie Brandon, DO  [NC] Karey Sportsman, DO      EKG: This EKG is signed and interpreted by me. Rate: 71  Rhythm: Sinus  Interpretation: sinus rhythm with supraventricular complexes, minimal voltage criteria for LVH, may be normal variant, nonspecific ST abnormality  Comparison: stable as compared to patient's most recent EKG on 12-    ED Course as of Jan 23 1632   Thu Jan 23, 2020   1330 Case is been discussed with the attending physician and he has evaluated the patient. Are planning a CTA of the neck and chest as well as check labs and EKG.     [LS]   [place]:     I have personally performed and/or participated in the history, exam, medical decision making, and procedures and agree with all pertinent clinical information unless otherwise noted. I have also reviewed and agree with the past medical, family and social history unless otherwise noted. I have discussed this patient in detail with the resident, and provided the instruction and education regarding patient here for evaluation of a pulsatile mass in the neck for the last 10 days. It is nonpainful but she can feel it pulsating she states. First noticed about 10 days ago. No preceding trauma or injury and no associated headache, neck pain or difficulty breathing or swallowing. No chest pain or shortness of breath. .  My findings/plan: Patient with no skin erythema, rashes or lesions noted to the neck. Has a visible and palpable pulsating mass to the right suprasternal notch region into the neck. No tracheal deviation or tenderness. Normal voice. No focal neurologic deficit. Heart rate regular, lungs are clear and equal.          [NC]      ED Course User Index  [LS] Jose Goodwin DO  [NC] Pepe Crockett DO       --------------------------------------------- PAST HISTORY ---------------------------------------------  Past Medical History:  has a past medical history of Diverticulosis, Hemorrhoids, internal, Irritable bowel syndrome, Osteoarthritis, Osteoporosis, Seizures ([facility] [address] and Urticaria, chronic. Past Surgical History:  has a past surgical history that includes Knee arthroscopy; Cholecystectomy; Tonsillectomy and adenoidectomy; Hysterectomy; Appendectomy; Colonoscopy; Carpal tunnel release (Left); Finger trigger release (Left); hernia repair (Right, 10/31/2014); Nerve Block (Right, 09/19/2019); Injection Procedure For Sacroiliac Joint (Right, 9/19/2019); Nerve Block (Right, 10/08/2019); and hc inject other perphrl nerv (Right, 10/8/2019). Social History:  reports that she has never smoked. She has never used smokeless tobacco. She reports that she does not drink alcohol or use drugs.     Family History: family history includes Cancer in her brother; Coronary Art Dis in her brother, father, and mother; Diabetes in her maternal aunt. The patients home medications have been reviewed. Allergies: Penicillins; Dextromethorphan; Factive [gemifloxacin mesylate]; Food; Mucinex dm [dm-guaifenesin er];  Other; and Shellfish-derived products    -------------------------------------------------- RESULTS -------------------------------------------------  Labs:  Results for orders placed or performed during the hospital encounter of 01/23/20   Protime-INR   Result Value Ref Range    Protime 12.4 9.3 - 12.4 sec    INR 1.1    APTT   Result Value Ref Range    aPTT 30.4 24.5 - 35.1 sec   CBC auto differential   Result Value Ref Range    WBC 9.3 4.5 - 11.5 E9/L    RBC 3.81 3.50 - 5.50 E12/L    Hemoglobin 11.5 11.5 - 15.5 g/dL    Hematocrit 35.5 34.0 - 48.0 %    MCV 93.2 80.0 - 99.9 fL    MCH 30.2 26.0 - 35.0 pg    MCHC 32.4 32.0 - 34.5 %    RDW 13.2 11.5 - 15.0 fL    Platelets 705 512 - 057 E9/L    MPV 9.1 7.0 - 12.0 fL    Neutrophils % 76.1 43.0 - 80.0 %    Immature Granulocytes % 0.5 0.0 - 5.0 %    Lymphocytes % 13.3 (L) 20.0 - 42.0 %    Monocytes % 9.2 2.0 - 12.0 %    Eosinophils % 0.8 0.0 - 6.0 %    Basophils % 0.1 0.0 - 2.0 %    Neutrophils Absolute 7.07 1.80 - 7.30 E9/L    Immature Granulocytes # 0.05 E9/L    Lymphocytes Absolute 1.23 (L) 1.50 - 4.00 E9/L    Monocytes Absolute 0.85 0.10 - 0.95 E9/L    Eosinophils Absolute 0.07 0.05 - 0.50 E9/L    Basophils Absolute 0.01 0.00 - 0.20 E9/L   Comprehensive Metabolic Panel   Result Value Ref Range    Sodium 135 132 - 146 mmol/L    Potassium 3.8 3.5 - 5.0 mmol/L    Chloride 92 (L) 98 - 107 mmol/L    CO2 32 (H) 22 - 29 mmol/L    Anion Gap 11 7 - 16 mmol/L    Glucose 101 (H) 74 - 99 mg/dL    BUN 9 8 - 23 mg/dL    CREATININE 0.6 0.5 - 1.0 mg/dL    GFR Non-African American >60 >=60 mL/min/1.73    GFR African American >60     Calcium 8.8 8.6 - 10.2 mg/dL    Total Protein 6.9 6.4 - 8.3 g/dL    Alb 4.0 3.5 - MULTIPLE SOLID NODULES      Nodule size                         Low-Risk Patient (t)                       High-Risk Patient (#)          (mm)*   __________________________________________________________________                                         <6 mm (<0.1 ml)              No routine follow up                      Optional CT at 12 months         6-8 mm (0.1-0.25 ml)       CT at 3-6 months, then                 CT   at 3-6 months, then                                             consider CT at 18-24 months             at 18-24 months      >8mm (>0.25 ml)               CT at 3-6 months, then                    CT at 3-6 months, then                                            consider CT at 18-24 months               at 18-24 months      SUBSOLID NODULES - Ground Glass      Nodule size (mm)*                Nodule Type                                Recommendation   __________________________________________________________________      <6 mm (<0.1 ml)               Single:  Ground Glass             No   routine follow up                                                                                             Single:  Part Solid                     No routine follow up                                                   Multiple                                 Ct at 3-6 months. If stable                                                                                              consider CT at 6-12 months      >6 mm (>0.1 ml)             Single:  Ground Glass              CT at   6-12 months                                                  Single:  Part Solid                 CT at 3-6 months to confirm                                                                                             persistence.   If unchanged                                                                                            and solid component remains <6 mm, annual CT should be                                                                                            performed for 5 years                                                   Multiple                                    CT at 3-6 months. Subsequent management                                                                                             based on the most                                                                                             suspicious nodule(s)    __________________________________________________________________   Note - Newly detected indeterminate nodule in persons 28years of age   or older. *   Average of length and width.   t   Minimal or absent history of smoking and of other known risk   factors. #  History of smoking or of other known risk factors. XR CHEST PORTABLE   Final Result   No acute cardiopulmonary disease. Note: Possible faint 6 mm nodule laterally in the right midlung.          ------------------------- NURSING NOTES AND VITALS REVIEWED ---------------------------  Date / Time Roomed:  1/23/2020 12:47 PM  ED Bed Assignment:  16/16    The nursing notes within the ED encounter and vital signs as below have been reviewed. BP (!) 152/69   Pulse 73   Temp 98.4 °F (36.9 °C) (Oral)   Resp 17   Ht 5' 4\" (1.626 m)   Wt 130 lb (59 kg)   SpO2 97%   BMI 22.31 kg/m²   Oxygen Saturation Interpretation: Normal      ------------------------------------------ PROGRESS NOTES ------------------------------------------  4:32 PM  I have spoken with the patient and discussed todays results, in addition to providing specific details for the plan of care and counseling regarding the diagnosis and prognosis. Their questions are answered at this time and they are agreeable with the plan.  I discussed at length with them reasons for immediate return here for re evaluation. They will followup with their primary care physician by calling their office tomorrow. --------------------------------- ADDITIONAL PROVIDER NOTES ---------------------------------  At this time the patient is without objective evidence of an acute process requiring hospitalization or inpatient management. They have remained hemodynamically stable throughout their entire ED visit and are stable for discharge with outpatient follow-up. The plan has been discussed in detail and they are aware of the specific conditions for emergent return, as well as the importance of follow-up. Current Discharge Medication List          Diagnosis:  1. Palpable mass of neck        Disposition:  Patient's disposition: Discharge to home  Patient's condition is stable.               Donnell Thao DO  Resident  01/23/20 9250 home

## 2020-01-24 ENCOUNTER — TELEPHONE (OUTPATIENT)
Dept: ADMINISTRATIVE | Age: 80
End: 2020-01-24

## 2020-01-24 LAB
EKG ATRIAL RATE: 71 BPM
EKG P AXIS: 61 DEGREES
EKG P-R INTERVAL: 176 MS
EKG Q-T INTERVAL: 354 MS
EKG QRS DURATION: 92 MS
EKG QTC CALCULATION (BAZETT): 384 MS
EKG R AXIS: 74 DEGREES
EKG T AXIS: 48 DEGREES
EKG VENTRICULAR RATE: 71 BPM

## 2020-01-24 NOTE — TELEPHONE ENCOUNTER
Pt's  called in. The Pt was seen yesterday (1/23/20) at the [facility] ED for throat issues. He states Dr told him to take her there. The Pt now needs a f/u for the visit and there are currently no opening for the upcoming week. Please advise.

## 2020-02-05 ENCOUNTER — OFFICE VISIT (OUTPATIENT)
Dept: FAMILY MEDICINE CLINIC | Age: 80
End: 2020-02-05
Payer: MEDICARE

## 2020-02-05 VITALS
HEART RATE: 78 BPM | SYSTOLIC BLOOD PRESSURE: 124 MMHG | HEIGHT: 64 IN | RESPIRATION RATE: 20 BRPM | DIASTOLIC BLOOD PRESSURE: 72 MMHG | BODY MASS INDEX: 22.31 KG/M2 | OXYGEN SATURATION: 98 %

## 2020-02-05 PROCEDURE — G8420 CALC BMI NORM PARAMETERS: HCPCS | Performed by: FAMILY MEDICINE

## 2020-02-05 PROCEDURE — G8427 DOCREV CUR MEDS BY ELIG CLIN: HCPCS | Performed by: FAMILY MEDICINE

## 2020-02-05 PROCEDURE — 1090F PRES/ABSN URINE INCON ASSESS: CPT | Performed by: FAMILY MEDICINE

## 2020-02-05 PROCEDURE — 1123F ACP DISCUSS/DSCN MKR DOCD: CPT | Performed by: FAMILY MEDICINE

## 2020-02-05 PROCEDURE — 99213 OFFICE O/P EST LOW 20 MIN: CPT | Performed by: FAMILY MEDICINE

## 2020-02-05 PROCEDURE — 1036F TOBACCO NON-USER: CPT | Performed by: FAMILY MEDICINE

## 2020-02-05 PROCEDURE — G8399 PT W/DXA RESULTS DOCUMENT: HCPCS | Performed by: FAMILY MEDICINE

## 2020-02-05 PROCEDURE — G8482 FLU IMMUNIZE ORDER/ADMIN: HCPCS | Performed by: FAMILY MEDICINE

## 2020-02-05 PROCEDURE — 4040F PNEUMOC VAC/ADMIN/RCVD: CPT | Performed by: FAMILY MEDICINE

## 2020-02-05 ASSESSMENT — ENCOUNTER SYMPTOMS
NAUSEA: 0
VOMITING: 0
SHORTNESS OF BREATH: 0
DIARRHEA: 0

## 2020-02-05 NOTE — PROGRESS NOTES
[address] [facility], [address]   [address] [facility]   Yuni Gresham MD     Patient: So Segura Birth: 1940  Visit Date: 2/5/20    Mariia Anglees is a 67y.o. year old female here today for   Chief Complaint   Patient presents with    Neck Swelling    ED Follow-up       HPI  Patient noticed swelling on front of the neck a few days after having right hip replacement surgery. Patient felt swelling and pain in anterior neck. 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 Review of Systems   Respiratory: Negative for shortness of breath. Cardiovascular: Negative for chest pain. Gastrointestinal: Negative for diarrhea, nausea and vomiting. Past medical, surgical, social and/or family historyreviewed, updated as needed, and are non-contributory (unless otherwise stated). Medications, allergies, and problem list also reviewed and updated as needed in patient's record. Current Outpatient Medications   Medication Sig Dispense Refill    lamoTRIgine (LAMICTAL) 100 MG tablet Take 1 tablet by mouth 3 times daily 90 tablet 0    Acetaminophen (TYLENOL ARTHRITIS PAIN PO) Take by mouth      primidone (MYSOLINE) 250 MG tablet Take 250 mg by mouth 3 times daily. No current facility-administered medications for this visit. Wt Readings from Last 3 Encounters:   01/23/20 130 lb (59 kg)   01/02/20 131 lb (59.4 kg)   12/26/19 134 lb (60.8 kg)                   Vitals:    02/05/20 1329   BP: 124/72   Pulse: 78   Resp: 20   SpO2: 98%        Physical Exam  Vitals signs reviewed. Constitutional:       Appearance: She is well-developed. Neck:      Comments: +visible right anterior lower neck pulsation, nontender  Cardiovascular:      Rate and Rhythm: Normal rate and regular rhythm. Heart sounds: Normal heart sounds. No murmur. No friction rub.    Pulmonary:      Effort: Pulmonary effort is

## 2020-05-21 ENCOUNTER — TELEPHONE (OUTPATIENT)
Dept: ADMINISTRATIVE | Age: 80
End: 2020-05-21

## 2020-05-21 NOTE — TELEPHONE ENCOUNTER
Pt states she is scheduled to undergo knee replacement surgery with Dr. Tisha Joya on 6/15/20. She states he is requesting surgical assessment prior to surgery. No appts available in doctor's schedule. Please call pt.

## 2020-05-27 ENCOUNTER — OFFICE VISIT (OUTPATIENT)
Dept: FAMILY MEDICINE CLINIC | Age: 80
End: 2020-05-27
Payer: MEDICARE

## 2020-05-27 VITALS
RESPIRATION RATE: 20 BRPM | SYSTOLIC BLOOD PRESSURE: 124 MMHG | WEIGHT: 137 LBS | OXYGEN SATURATION: 98 % | HEART RATE: 72 BPM | HEIGHT: 64 IN | BODY MASS INDEX: 23.39 KG/M2 | DIASTOLIC BLOOD PRESSURE: 78 MMHG

## 2020-05-27 PROCEDURE — 99213 OFFICE O/P EST LOW 20 MIN: CPT | Performed by: FAMILY MEDICINE

## 2020-05-27 PROCEDURE — G8420 CALC BMI NORM PARAMETERS: HCPCS | Performed by: FAMILY MEDICINE

## 2020-05-27 PROCEDURE — 4040F PNEUMOC VAC/ADMIN/RCVD: CPT | Performed by: FAMILY MEDICINE

## 2020-05-27 PROCEDURE — 1036F TOBACCO NON-USER: CPT | Performed by: FAMILY MEDICINE

## 2020-05-27 PROCEDURE — 93000 ELECTROCARDIOGRAM COMPLETE: CPT | Performed by: FAMILY MEDICINE

## 2020-05-27 PROCEDURE — G8399 PT W/DXA RESULTS DOCUMENT: HCPCS | Performed by: FAMILY MEDICINE

## 2020-05-27 PROCEDURE — 1090F PRES/ABSN URINE INCON ASSESS: CPT | Performed by: FAMILY MEDICINE

## 2020-05-27 PROCEDURE — G8427 DOCREV CUR MEDS BY ELIG CLIN: HCPCS | Performed by: FAMILY MEDICINE

## 2020-05-27 PROCEDURE — 1123F ACP DISCUSS/DSCN MKR DOCD: CPT | Performed by: FAMILY MEDICINE

## 2020-05-27 RX ORDER — DIAZEPAM 5 MG/1
5 TABLET ORAL NIGHTLY PRN
Qty: 90 TABLET | Refills: 1 | Status: SHIPPED
Start: 2020-05-27 | End: 2020-11-10 | Stop reason: SDUPTHER

## 2020-05-27 ASSESSMENT — ENCOUNTER SYMPTOMS
SHORTNESS OF BREATH: 0
VOMITING: 0
NAUSEA: 0
DIARRHEA: 0

## 2020-05-27 NOTE — PROGRESS NOTES
[address] [facility], [address]   [address] [facility]   Ravi Gutierrez MD     Patient: Reshma Eye Birth: 1940  Visit Date: 5/27/20    Brennen Funes is a 79y.o. year old female here today for   Chief Complaint   Patient presents with    Pre-op Exam       HPI  Patient here for surgery clearance right knee replacement surgery. Will be at [facility] by Dr. Vince Singh from [facility]. Denies anesthesia complications in the past--recently had right hip replacement. Denies chest pain or shortness of breath. Patient doing well on current regimen for anxiety. Review of Systems   Eyes: Positive for visual disturbance (left eye blurry vision comes and goes). Respiratory: Negative for shortness of breath. Cardiovascular: Negative for chest pain, palpitations and leg swelling. Gastrointestinal: Negative for diarrhea, nausea and vomiting. Genitourinary: Negative for difficulty urinating, dysuria and frequency. Skin: Negative for rash. Psychiatric/Behavioral: Negative for dysphoric mood. Past medical, surgical, social and/or family historyreviewed, updated as needed, and are non-contributory (unless otherwise stated). Medications, allergies, and problem list also reviewed and updated as needed in patient's record. Current Outpatient Medications   Medication Sig Dispense Refill    diazePAM (VALIUM) 5 MG tablet Take 1 tablet by mouth nightly as needed for Sleep. 90 tablet 1    lamoTRIgine (LAMICTAL) 100 MG tablet Take 1 tablet by mouth 3 times daily 90 tablet 0    Acetaminophen (TYLENOL ARTHRITIS PAIN PO) Take by mouth      primidone (MYSOLINE) 250 MG tablet Take 250 mg by mouth 3 times daily. No current facility-administered medications for this visit.         Wt Readings from Last 3 Encounters:   05/27/20 137 lb (62.1 kg)   01/23/20 130 lb (59 kg)   01/02/20 131 lb (59.4 kg)                   Vitals:    05/27/20 1607

## 2020-08-22 NOTE — PATIENT INSTRUCTIONS
Patient Education        Preventing Falls: Care Instructions  Your Care Instructions    Getting around your home safely can be a challenge if you have injuries or health problems that make it easy for you to fall. Loose rugs and furniture in walkways are among the dangers for many older people who have problems walking or who have poor eyesight. People who have conditions such as arthritis, osteoporosis, or dementia also have to be careful not to fall. You can make your home safer with a few simple measures. Follow-up care is a key part of your treatment and safety. Be sure to make and go to all appointments, and call your doctor if you are having problems. It's also a good idea to know your test results and keep a list of the medicines you take. How can you care for yourself at home? Taking care of yourself  · You may get dizzy if you do not drink enough water. To prevent dehydration, drink plenty of fluids, enough so that your urine is light yellow or clear like water. Choose water and other caffeine-free clear liquids. If you have kidney, heart, or liver disease and have to limit fluids, talk with your doctor before you increase the amount of fluids you drink. · Exercise regularly to improve your strength, muscle tone, and balance. Walk if you can. Swimming may be a good choice if you cannot walk easily. · Have your vision and hearing checked each year or any time you notice a change. If you have trouble seeing and hearing, you might not be able to avoid objects and could lose your balance. · Know the side effects of the medicines you take. Ask your doctor or pharmacist whether the medicines you take can affect your balance. Sleeping pills or sedatives can affect your balance. · Limit the amount of alcohol you drink. Alcohol can impair your balance and other senses. · Ask your doctor whether calluses or corns on your feet need to be removed.  If you wear loose-fitting shoes because of calluses or corns, that will allow you to sit while showering. · Get into a tub or shower by putting the weaker leg in first. Get out of a tub or shower with your strong side first.  · Repair loose toilet seats and consider installing a raised toilet seat to make getting on and off the toilet easier. · Keep your bathroom door unlocked while you are in the shower. Where can you learn more? Go to https://Pa-Go Mobilepepiceweb.[employer]. org and sign in to your [employer] account. Enter [phone] in the [employer] box to learn more about \"Preventing Falls: Care Instructions. \"     If you do not have an account, please click on the \"Sign Up Now\" link. Current as of: August 6, 2019  Content Version: 12.3  © 8734-0511 [employer], Incorporated. Care instructions adapted under license by [facility] ([facility]). If you have questions about a medical condition or this instruction, always ask your healthcare professional. [address] any warranty or liability for your use of this information. 22-Aug-2020 16:16

## 2020-10-08 ENCOUNTER — NURSE ONLY (OUTPATIENT)
Dept: FAMILY MEDICINE CLINIC | Age: 80
End: 2020-10-08
Payer: MEDICARE

## 2020-10-08 PROCEDURE — 90694 VACC AIIV4 NO PRSRV 0.5ML IM: CPT | Performed by: FAMILY MEDICINE

## 2020-10-08 PROCEDURE — G0008 ADMIN INFLUENZA VIRUS VAC: HCPCS | Performed by: FAMILY MEDICINE

## 2020-11-10 ENCOUNTER — OFFICE VISIT (OUTPATIENT)
Dept: FAMILY MEDICINE CLINIC | Age: 80
End: 2020-11-10
Payer: MEDICARE

## 2020-11-10 VITALS
SYSTOLIC BLOOD PRESSURE: 138 MMHG | DIASTOLIC BLOOD PRESSURE: 78 MMHG | WEIGHT: 135 LBS | BODY MASS INDEX: 23.05 KG/M2 | RESPIRATION RATE: 20 BRPM | HEIGHT: 64 IN | HEART RATE: 79 BPM | OXYGEN SATURATION: 97 %

## 2020-11-10 PROCEDURE — 1123F ACP DISCUSS/DSCN MKR DOCD: CPT | Performed by: FAMILY MEDICINE

## 2020-11-10 PROCEDURE — 1036F TOBACCO NON-USER: CPT | Performed by: FAMILY MEDICINE

## 2020-11-10 PROCEDURE — G8427 DOCREV CUR MEDS BY ELIG CLIN: HCPCS | Performed by: FAMILY MEDICINE

## 2020-11-10 PROCEDURE — 1090F PRES/ABSN URINE INCON ASSESS: CPT | Performed by: FAMILY MEDICINE

## 2020-11-10 PROCEDURE — G8484 FLU IMMUNIZE NO ADMIN: HCPCS | Performed by: FAMILY MEDICINE

## 2020-11-10 PROCEDURE — 99213 OFFICE O/P EST LOW 20 MIN: CPT | Performed by: FAMILY MEDICINE

## 2020-11-10 PROCEDURE — 4040F PNEUMOC VAC/ADMIN/RCVD: CPT | Performed by: FAMILY MEDICINE

## 2020-11-10 PROCEDURE — G8399 PT W/DXA RESULTS DOCUMENT: HCPCS | Performed by: FAMILY MEDICINE

## 2020-11-10 PROCEDURE — G8420 CALC BMI NORM PARAMETERS: HCPCS | Performed by: FAMILY MEDICINE

## 2020-11-10 RX ORDER — DIAZEPAM 5 MG/1
5 TABLET ORAL NIGHTLY PRN
Qty: 90 TABLET | Refills: 1 | Status: SHIPPED
Start: 2020-11-10 | End: 2022-07-11 | Stop reason: SDUPTHER

## 2020-11-10 ASSESSMENT — ENCOUNTER SYMPTOMS
DIARRHEA: 0
SHORTNESS OF BREATH: 0
VOMITING: 0
NAUSEA: 0

## 2020-11-10 NOTE — PATIENT INSTRUCTIONS
Patient Education        Learning About Sleeping Well  What does sleeping well mean? Sleeping well means getting enough sleep. How much sleep is enough varies among people. The number of hours you sleep is not as important as how you feel when you wake up. If you do not feel refreshed, you probably need more sleep. Another sign of not getting enough sleep is feeling tired during the day. The average total nightly sleep time is 7½ to 8 hours. Healthy adults may need a little more or a little less than this. Why is getting enough sleep important? Getting enough quality sleep is a basic part of good health. When your sleep suffers, your mood and your thoughts can suffer too. You may find yourself feeling more grumpy or stressed. Not getting enough sleep also can lead to serious problems, including injury, accidents, anxiety, and depression. What might cause poor sleeping? Many things can cause sleep problems, including:  · Stress. Stress can be caused by fear about a single event, such as giving a speech. Or you may have ongoing stress, such as worry about work or school. · Depression, anxiety, and other mental or emotional conditions. · Changes in your sleep habits or surroundings. This includes changes that happen where you sleep, such as noise, light, or sleeping in a different bed. It also includes changes in your sleep pattern, such as having jet lag or working a late shift. · Health problems, such as pain, breathing problems, and restless legs syndrome. · Lack of regular exercise. How can you help yourself? Here are some tips that may help you sleep more soundly and wake up feeling more refreshed. Your sleeping area   · Use your bedroom only for sleeping and sex. A bit of light reading may help you fall asleep. But if it doesn't, do your reading elsewhere in the house. Don't watch TV in bed.   · Be sure your bed is big enough to stretch out comfortably, especially if you have a sleep 2020               Content Version: 12.6  © 6145-4718 [employer]. Care instructions adapted under license by [facility] ([facility]). If you have questions about a medical condition or this instruction, always ask your healthcare professional. [address] any warranty or liability for your use of this information.

## 2020-11-10 NOTE — PROGRESS NOTES
[address] [facility], [address]   [address] [facility]   Saritha Gates MD     Patient: Mallika Deleon Birth: 1940  Visit Date: 11/10/20    Frederick Lyn is a 79y.o. year old female here today for   Chief Complaint   Patient presents with    Insomnia       HPI  Patient states insomnia has worsened since having knee surgery. Patient states pain is causing her to have too much discomfort to fall asleep. Patient states she had right knee replacement surgery in June. Still has pain and swelling. Patient has swelling in right knee. Patient to follow up with orthopedics. Review of Systems   Respiratory: Negative for shortness of breath. Cardiovascular: Negative for chest pain, palpitations and leg swelling. Gastrointestinal: Negative for diarrhea, nausea and vomiting. Genitourinary: Negative for difficulty urinating, dysuria and frequency. Psychiatric/Behavioral: Negative for dysphoric mood. Past medical, surgical, social and/or family historyreviewed, updated as needed, and are non-contributory (unless otherwise stated). Medications, allergies, and problem list also reviewed and updated as needed in patient's record. Current Outpatient Medications   Medication Sig Dispense Refill    diazePAM (VALIUM) 5 MG tablet Take 1 tablet by mouth nightly as needed for Sleep. 90 tablet 1    lamoTRIgine (LAMICTAL) 100 MG tablet Take 1 tablet by mouth 3 times daily 90 tablet 0    Acetaminophen (TYLENOL ARTHRITIS PAIN PO) Take by mouth      primidone (MYSOLINE) 250 MG tablet Take 250 mg by mouth 3 times daily. No current facility-administered medications for this visit. Wt Readings from Last 3 Encounters:   11/10/20 135 lb (61.2 kg)   05/27/20 137 lb (62.1 kg)   01/23/20 130 lb (59 kg)                   Vitals:    11/10/20 1601   BP: 138/78   Pulse: 79   Resp: 20   SpO2: 97%        Physical Exam  Vitals signs reviewed. Constitutional:       Appearance: She is well-developed. Cardiovascular:      Rate and Rhythm: Normal rate and regular rhythm. Heart sounds: Normal heart sounds. No murmur. No friction rub. Pulmonary:      Effort: Pulmonary effort is normal. No respiratory distress. Breath sounds: Normal breath sounds. No wheezing or rales. Abdominal:      General: Bowel sounds are normal. There is no distension. Palpations: Abdomen is soft. Tenderness: There is no abdominal tenderness. There is no guarding or rebound. Skin:     General: Skin is warm and dry. Neurological:      Mental Status: She is alert and oriented to person, place, and time.        Results for orders placed or performed during the hospital encounter of 01/23/20   Protime-INR   Result Value Ref Range    Protime 12.4 9.3 - 12.4 sec    INR 1.1    APTT   Result Value Ref Range    aPTT 30.4 24.5 - 35.1 sec   CBC auto differential   Result Value Ref Range    WBC 9.3 4.5 - 11.5 E9/L    RBC 3.81 3.50 - 5.50 E12/L    Hemoglobin 11.5 11.5 - 15.5 g/dL    Hematocrit 35.5 34.0 - 48.0 %    MCV 93.2 80.0 - 99.9 fL    MCH 30.2 26.0 - 35.0 pg    MCHC 32.4 32.0 - 34.5 %    RDW 13.2 11.5 - 15.0 fL    Platelets 966 615 - 659 E9/L    MPV 9.1 7.0 - 12.0 fL    Neutrophils % 76.1 43.0 - 80.0 %    Immature Granulocytes % 0.5 0.0 - 5.0 %    Lymphocytes % 13.3 (L) 20.0 - 42.0 %    Monocytes % 9.2 2.0 - 12.0 %    Eosinophils % 0.8 0.0 - 6.0 %    Basophils % 0.1 0.0 - 2.0 %    Neutrophils Absolute 7.07 1.80 - 7.30 E9/L    Immature Granulocytes # 0.05 E9/L    Lymphocytes Absolute 1.23 (L) 1.50 - 4.00 E9/L    Monocytes Absolute 0.85 0.10 - 0.95 E9/L    Eosinophils Absolute 0.07 0.05 - 0.50 E9/L    Basophils Absolute 0.01 0.00 - 0.20 E9/L   Comprehensive Metabolic Panel   Result Value Ref Range    Sodium 135 132 - 146 mmol/L    Potassium 3.8 3.5 - 5.0 mmol/L    Chloride 92 (L) 98 - 107 mmol/L    CO2 32 (H) 22 - 29 mmol/L    Anion Gap 11 7 - 16 mmol/L    Glucose 101 (H) 74 - 99 mg/dL    BUN 9 8 - 23 mg/dL    CREATININE 0.6 0.5 - 1.0 mg/dL    GFR Non-African American >60 >=60 mL/min/1.73    GFR African American >60     Calcium 8.8 8.6 - 10.2 mg/dL    Total Protein 6.9 6.4 - 8.3 g/dL    Alb 4.0 3.5 - 5.2 g/dL    Total Bilirubin 0.3 0.0 - 1.2 mg/dL    Alkaline Phosphatase 94 35 - 104 U/L    ALT 25 0 - 32 U/L    AST 36 (H) 0 - 31 U/L   Troponin   Result Value Ref Range    Troponin <0.01 0.00 - 0.03 ng/mL   EKG 12 Lead   Result Value Ref Range    Ventricular Rate 71 BPM    Atrial Rate 71 BPM    P-R Interval 176 ms    QRS Duration 92 ms    Q-T Interval 354 ms    QTc Calculation (Bazett) 384 ms    P Axis 61 degrees    R Axis 74 degrees    T Axis 48 degrees       ASSESSMENT/PLAN  Edouard Wren was seen today for insomnia. Diagnoses and all orders for this visit:    Primary insomnia  -     diazePAM (VALIUM) 5 MG tablet; Take 1 tablet by mouth nightly as needed for Sleep. S/P TKR (total knee replacement), right        -     Patient encouraged to continue follow up with orthopedics          Phone/MyChart follow up if tests abnormal.    Return in about 6 months (around 5/10/2021) for Annual Medicare Wellness Visit--30 minutes. or sooner if necessary. I have reviewed my findings and recommendations with Edouard Wren.      Domitila Bonner M.D

## 2021-02-01 ENCOUNTER — OFFICE VISIT (OUTPATIENT)
Dept: FAMILY MEDICINE CLINIC | Age: 81
End: 2021-02-01
Payer: MEDICARE

## 2021-02-01 VITALS
BODY MASS INDEX: 23.22 KG/M2 | HEART RATE: 80 BPM | SYSTOLIC BLOOD PRESSURE: 138 MMHG | WEIGHT: 136 LBS | DIASTOLIC BLOOD PRESSURE: 72 MMHG | OXYGEN SATURATION: 97 % | RESPIRATION RATE: 20 BRPM | HEIGHT: 64 IN

## 2021-02-01 DIAGNOSIS — G40.209 PARTIAL SYMPTOMATIC EPILEPSY WITH COMPLEX PARTIAL SEIZURES, NOT INTRACTABLE, WITHOUT STATUS EPILEPTICUS (HCC): ICD-10-CM

## 2021-02-01 DIAGNOSIS — Z01.810 PREOP CARDIOVASCULAR EXAM: ICD-10-CM

## 2021-02-01 DIAGNOSIS — Z01.810 PREOP CARDIOVASCULAR EXAM: Primary | ICD-10-CM

## 2021-02-01 LAB
ALBUMIN SERPL-MCNC: 4.7 G/DL (ref 3.5–5.2)
ALP BLD-CCNC: 88 U/L (ref 35–104)
ALT SERPL-CCNC: 9 U/L (ref 0–32)
ANION GAP SERPL CALCULATED.3IONS-SCNC: 9 MMOL/L (ref 7–16)
AST SERPL-CCNC: 17 U/L (ref 0–31)
BILIRUB SERPL-MCNC: <0.2 MG/DL (ref 0–1.2)
BUN BLDV-MCNC: 15 MG/DL (ref 8–23)
CALCIUM SERPL-MCNC: 9.2 MG/DL (ref 8.6–10.2)
CHLORIDE BLD-SCNC: 101 MMOL/L (ref 98–107)
CO2: 29 MMOL/L (ref 22–29)
CREAT SERPL-MCNC: 0.7 MG/DL (ref 0.5–1)
GFR AFRICAN AMERICAN: >60
GFR NON-AFRICAN AMERICAN: >60 ML/MIN/1.73
GLUCOSE BLD-MCNC: 95 MG/DL (ref 74–99)
HCT VFR BLD CALC: 44.3 % (ref 34–48)
HEMOGLOBIN: 14.5 G/DL (ref 11.5–15.5)
MCH RBC QN AUTO: 29.8 PG (ref 26–35)
MCHC RBC AUTO-ENTMCNC: 32.7 % (ref 32–34.5)
MCV RBC AUTO: 91 FL (ref 80–99.9)
PDW BLD-RTO: 12.4 FL (ref 11.5–15)
PLATELET # BLD: 181 E9/L (ref 130–450)
PMV BLD AUTO: 10.4 FL (ref 7–12)
POTASSIUM SERPL-SCNC: 3.9 MMOL/L (ref 3.5–5)
RBC # BLD: 4.87 E12/L (ref 3.5–5.5)
SODIUM BLD-SCNC: 139 MMOL/L (ref 132–146)
TOTAL PROTEIN: 7.3 G/DL (ref 6.4–8.3)
WBC # BLD: 7 E9/L (ref 4.5–11.5)

## 2021-02-01 PROCEDURE — G8427 DOCREV CUR MEDS BY ELIG CLIN: HCPCS | Performed by: FAMILY MEDICINE

## 2021-02-01 PROCEDURE — G8484 FLU IMMUNIZE NO ADMIN: HCPCS | Performed by: FAMILY MEDICINE

## 2021-02-01 PROCEDURE — 99213 OFFICE O/P EST LOW 20 MIN: CPT | Performed by: FAMILY MEDICINE

## 2021-02-01 PROCEDURE — 1036F TOBACCO NON-USER: CPT | Performed by: FAMILY MEDICINE

## 2021-02-01 PROCEDURE — 4040F PNEUMOC VAC/ADMIN/RCVD: CPT | Performed by: FAMILY MEDICINE

## 2021-02-01 PROCEDURE — 1123F ACP DISCUSS/DSCN MKR DOCD: CPT | Performed by: FAMILY MEDICINE

## 2021-02-01 PROCEDURE — G8399 PT W/DXA RESULTS DOCUMENT: HCPCS | Performed by: FAMILY MEDICINE

## 2021-02-01 PROCEDURE — G8420 CALC BMI NORM PARAMETERS: HCPCS | Performed by: FAMILY MEDICINE

## 2021-02-01 PROCEDURE — 1090F PRES/ABSN URINE INCON ASSESS: CPT | Performed by: FAMILY MEDICINE

## 2021-02-01 PROCEDURE — 93000 ELECTROCARDIOGRAM COMPLETE: CPT | Performed by: FAMILY MEDICINE

## 2021-02-01 ASSESSMENT — ENCOUNTER SYMPTOMS
SHORTNESS OF BREATH: 0
VOMITING: 0
TROUBLE SWALLOWING: 1
DIARRHEA: 0
NAUSEA: 0

## 2021-02-01 ASSESSMENT — PATIENT HEALTH QUESTIONNAIRE - PHQ9
SUM OF ALL RESPONSES TO PHQ9 QUESTIONS 1 & 2: 0
2. FEELING DOWN, DEPRESSED OR HOPELESS: 0
SUM OF ALL RESPONSES TO PHQ QUESTIONS 1-9: 0
SUM OF ALL RESPONSES TO PHQ QUESTIONS 1-9: 0
1. LITTLE INTEREST OR PLEASURE IN DOING THINGS: 0

## 2021-02-01 NOTE — PROGRESS NOTES
[address] [facility], [address]   [address] [facility]   Butch Dupont MD     Patient: Don Mix Birth: 1940  Visit Date: 2/1/21    Rakesh Elder is a 79y.o. year old female here today for   Chief Complaint   Patient presents with    Pre-op Exam     knee replacment left        HPI  Patient here for surgery clearance for left knee replacement. Denies anesthesia complications in the past. Denies chest pain or shortness of breath. Patient doing well on current regimen for epilepsy. No recent seizures. Review of Systems   HENT: Positive for trouble swallowing (comes and goes with vascular anomaly). Eyes: Negative for visual disturbance. Respiratory: Negative for shortness of breath. Cardiovascular: Negative for chest pain, palpitations and leg swelling. Gastrointestinal: Negative for diarrhea, nausea and vomiting. Genitourinary: Negative for difficulty urinating, dysuria and frequency. Skin: Negative for rash. Psychiatric/Behavioral: Negative for dysphoric mood. Past medical, surgical, social and/or family historyreviewed, updated as needed, and are non-contributory (unless otherwise stated). Medications, allergies, and problem list also reviewed and updated as needed in patient's record. Current Outpatient Medications   Medication Sig Dispense Refill    diazePAM (VALIUM) 5 MG tablet Take 1 tablet by mouth nightly as needed for Sleep. 90 tablet 1    lamoTRIgine (LAMICTAL) 100 MG tablet Take 1 tablet by mouth 3 times daily 90 tablet 0    Acetaminophen (TYLENOL ARTHRITIS PAIN PO) Take by mouth      primidone (MYSOLINE) 250 MG tablet Take 250 mg by mouth 3 times daily. No current facility-administered medications for this visit.         Wt Readings from Last 3 Encounters:   02/01/21 136 lb (61.7 kg)   11/10/20 135 lb (61.2 kg)   05/27/20 137 lb (62.1 kg)                   Vitals:    02/01/21 1136   BP: 138/72

## 2021-02-04 ENCOUNTER — HOSPITAL ENCOUNTER (OUTPATIENT)
Dept: CT IMAGING | Age: 81
Discharge: HOME | End: 2021-02-06
Payer: MEDICARE

## 2021-02-04 DIAGNOSIS — M17.12 OSTEOARTHRITIS OF LEFT KNEE, UNSPECIFIED OSTEOARTHRITIS TYPE: ICD-10-CM

## 2021-02-04 PROCEDURE — 73700 CT LOWER EXTREMITY W/O DYE: CPT

## 2021-05-10 ENCOUNTER — OFFICE VISIT (OUTPATIENT)
Dept: FAMILY MEDICINE CLINIC | Age: 81
End: 2021-05-10
Payer: MEDICARE

## 2021-05-10 VITALS
WEIGHT: 128 LBS | HEIGHT: 64 IN | RESPIRATION RATE: 20 BRPM | HEART RATE: 79 BPM | SYSTOLIC BLOOD PRESSURE: 136 MMHG | BODY MASS INDEX: 21.85 KG/M2 | DIASTOLIC BLOOD PRESSURE: 80 MMHG | OXYGEN SATURATION: 97 %

## 2021-05-10 DIAGNOSIS — Z00.00 ROUTINE GENERAL MEDICAL EXAMINATION AT A HEALTH CARE FACILITY: Primary | ICD-10-CM

## 2021-05-10 DIAGNOSIS — Z13.220 SCREENING CHOLESTEROL LEVEL: ICD-10-CM

## 2021-05-10 PROCEDURE — 1123F ACP DISCUSS/DSCN MKR DOCD: CPT | Performed by: FAMILY MEDICINE

## 2021-05-10 PROCEDURE — 4040F PNEUMOC VAC/ADMIN/RCVD: CPT | Performed by: FAMILY MEDICINE

## 2021-05-10 PROCEDURE — G0439 PPPS, SUBSEQ VISIT: HCPCS | Performed by: FAMILY MEDICINE

## 2021-05-10 RX ORDER — CLINDAMYCIN HYDROCHLORIDE 300 MG/1
CAPSULE ORAL
COMMUNITY
Start: 2021-05-05 | End: 2021-10-19

## 2021-05-10 RX ORDER — SULFAMETHOXAZOLE AND TRIMETHOPRIM 800; 160 MG/1; MG/1
TABLET ORAL
COMMUNITY
Start: 2021-05-05 | End: 2021-10-19

## 2021-05-10 ASSESSMENT — PATIENT HEALTH QUESTIONNAIRE - PHQ9
1. LITTLE INTEREST OR PLEASURE IN DOING THINGS: 1
2. FEELING DOWN, DEPRESSED OR HOPELESS: 1
SUM OF ALL RESPONSES TO PHQ QUESTIONS 1-9: 2
SUM OF ALL RESPONSES TO PHQ9 QUESTIONS 1 & 2: 2
SUM OF ALL RESPONSES TO PHQ QUESTIONS 1-9: 2
SUM OF ALL RESPONSES TO PHQ QUESTIONS 1-9: 2

## 2021-05-10 NOTE — PROGRESS NOTES
Medicare Annual Wellness Visit  Name: Cara Billy Date: 5/15/2021   MRN: [id number] Sex: Female   Age: [de-identified] y.o. Ethnicity: Non-/Non    : 1940 Race: Rock Frausto is here for [facility] AWV    Patient still has difficulty sleeping. Patient taking Valium prn. Patient had left knee replacement a few months ago. Patient still seeing ortho. Screenings for behavioral, psychosocial and functional/safety risks, and cognitive dysfunction are all negative except as indicated below. These results, as well as other patient data from the [address] form, are documented in Flowsheets linked to this Encounter. Allergies   Allergen Reactions    Penicillins Anaphylaxis and Swelling    Dextromethorphan Hives    Factive [Gemifloxacin Mesylate]     Food Hives     shrimp    Mucinex Dm [Dm-Guaifenesin Er] Hives    Other      THORPHAN    Shellfish-Derived Products Swelling         Prior to Visit Medications    Medication Sig Taking? Authorizing Provider   clindamycin (CLEOCIN) 300 MG capsule  Yes Historical Provider, MD   sulfamethoxazole-trimethoprim (BACTRIM DS;SEPTRA DS) 800-160 MG per tablet  Yes Historical Provider, MD   diazePAM (VALIUM) 5 MG tablet Take 1 tablet by mouth nightly as needed for Sleep. Yes Ita Villagomez MD   lamoTRIgine (LAMICTAL) 100 MG tablet Take 1 tablet by mouth 3 times daily Yes Julius Scruggs PA-C   Acetaminophen (TYLENOL ARTHRITIS PAIN PO) Take by mouth Yes Historical Provider, MD   primidone (MYSOLINE) 250 MG tablet Take 250 mg by mouth 3 times daily. Yes Historical Provider, MD         Past Medical History:   Diagnosis Date    Diverticulosis     Hemorrhoids, internal     Irritable bowel syndrome     Osteoarthritis     rt knee    Osteoporosis     Seizures ([facility] [address]     complex partial  last seizure ?     Urticaria, chronic        Past Surgical History:   Procedure Laterality Date    APPENDECTOMY      CARPAL TUNNEL RELEASE Left     CHOLECYSTECTOMY      COLONOSCOPY      FINGER TRIGGER RELEASE Left     HC INJECT OTHER PERPHRL NERV Right 10/8/2019    RIGHT HIP INJECTION (CPT 13980) performed by Sima Mckeon MD at [address] [facility] Right 9/19/2019    RIGHT SACROILIAC JOINT INJECTION performed by Sima Mckeon MD at [address] Right 10/31/2014    Laparoscopic right inguinal hernia repair with mesh     HYSTERECTOMY      vaginal    KNEE ARTHROSCOPY      right    NERVE BLOCK Right 09/19/2019    sacroiliac joint    NERVE BLOCK Right 10/08/2019    hip injection    TONSILLECTOMY AND ADENOIDECTOMY      TOTAL HIP ARTHROPLASTY Right 01/2020         Family History   Problem Relation Age of Onset    Coronary Art Dis Mother     Coronary Art Dis Father     Coronary Art Dis Brother     Cancer Brother         lymph nodes and lung    Diabetes Maternal Aunt        CareTeam (Including outside providers/suppliers regularly involved in providing care):   Patient Care Team:  Doc Franz MD as PCP - General  Doc Franz MD as PCP - [facility] Empaneled Provider  Rommel Jain MD as Referring Physician  Shannon Santoyo MD as Consulting Physician (Neurology)  Mel Paulson MD as Consulting Physician (Cardiology)  Vernon Clemons MD as Referring Physician (Pain Management)    Wt Readings from Last 3 Encounters:   05/10/21 128 lb (58.1 kg)   02/01/21 136 lb (61.7 kg)   11/10/20 135 lb (61.2 kg)     Vitals:    05/10/21 1438   BP: 136/80   Pulse: 79   Resp: 20   SpO2: 97%   Weight: 128 lb (58.1 kg)   Height: 5' 4\" (1.626 m)     Body mass index is 21.97 kg/m². Based upon direct observation of the patient, evaluation of cognition reveals recent and remote memory intact. Physical Exam  Vitals reviewed. Constitutional:       Appearance: She is well-developed. Cardiovascular:      Rate and Rhythm: Normal rate and regular rhythm. Heart sounds: Normal heart sounds. No murmur heard. No friction rub. Pulmonary:      Effort: Pulmonary effort is normal. No respiratory distress. Breath sounds: Normal breath sounds. No wheezing or rales. Abdominal:      General: Bowel sounds are normal. There is no distension. Palpations: Abdomen is soft. Tenderness: There is no abdominal tenderness. There is no guarding or rebound. Skin:     General: Skin is warm and dry. Neurological:      Mental Status: She is alert and oriented to person, place, and time. Psychiatric:         Mood and Affect: Mood is anxious. Patient's complete Health Risk Assessment and screening values have been reviewed and are found in Flowsheets. The following problems were reviewed today and where indicated follow up appointments were made and/or referrals ordered. Positive Risk Factor Screenings with Interventions:     Cognitive Impairment Interventions:  · none indicated         General Health and ACP:  General  In general, how would you say your health is?: Fair  In the past 7 days, have you experienced any of the following?  New or Increased Pain, New or Increased Fatigue, Loneliness, Social Isolation, Stress or Anger?: (!) Stress, Anger  Do you get the social and emotional support that you need?: Yes  Do you have a Living Will?: Yes  Advance Directives     Power of  Living Will ACP-Advance Directive ACP-Power of     Not on File Not on File Not on File Not on File      General Health Risk Interventions:  · Stress: patient declines any further evaluation/treatment for this issue    Health Habits/Nutrition:  Health Habits/Nutrition  Do you exercise for at least 20 minutes 2-3 times per week?: (!) No  Have you lost any weight without trying in the past 3 months?: No  Do you eat only one meal per day?: No  Have you seen the dentist within the past year?: (!) No  Body mass index: 21.97  Health Habits/Nutrition Interventions:  · Inadequate physical activity:  patient agrees to exercise for at least 150 minutes/week    Hearing/Vision:  No exam data present  Hearing/Vision  Do you or your family notice any trouble with your hearing that hasn't been managed with hearing aids?: (!) Yes  Do you have difficulty driving, watching TV, or doing any of your daily activities because of your eyesight?: No  Have you had an eye exam within the past year?: (!) No  Hearing/Vision Interventions:  · Vision concerns:  patient declines any further evaluation/treatment for this issue      Personalized Preventive Plan   Current Health Maintenance Status  Immunization History   Administered Date(s) Administered    Influenza, High Dose (Fluzone 65 yrs and older) 09/26/2017, 09/24/2018    Influenza, Quadv, adjuvanted, 65 yrs +, IM, PF (Fluad) 10/08/2020    Influenza, Triv, inactivated, subunit, adjuvanted, IM (Fluad 65 yrs and older) 10/24/2019    Pneumococcal Conjugate 13-valent ([id number]) 03/23/2017    Pneumococcal Polysaccharide ([id number]) 03/23/2011    Tdap (Boostrix, Adacel) 12/26/2019        Health Maintenance   Topic Date Due    Shingles Vaccine (1 of 2) Never done   [employer] Visit (AWV)  Never done    DTaP/Tdap/Td vaccine (2 - Td) 12/26/2029    DEXA (modify frequency per FRAX score)  Completed    Flu vaccine  Completed    Pneumococcal 65+ years Vaccine  Completed    COVID-19 Vaccine  Completed    Hepatitis A vaccine  Aged Out    Hepatitis B vaccine  Aged Out    Hib vaccine  Aged Out    Meningococcal (ACWY) vaccine  Aged Out     Recommendations for [employer] Due: see orders and patient instructions/AVS.  . Recommended screening schedule for the next 5-10 years is provided to the patient in written form: see Patient Instructions/AVS.    Lele Emanuel was seen today for medicare awv.     Diagnoses and all orders for this visit:    Routine general medical examination at a health care facility    Screening cholesterol level  -     Lipid Panel; Future  -     TSH without Reflex;  Future

## 2021-05-10 NOTE — PATIENT INSTRUCTIONS
Personalized Preventive Plan for Yves Abreu - 5/10/2021  Medicare offers a range of preventive health benefits. Some of the tests and screenings are paid in full while other may be subject to a deductible, co-insurance, and/or copay. Some of these benefits include a comprehensive review of your medical history including lifestyle, illnesses that may run in your family, and various assessments and screenings as appropriate. After reviewing your medical record and screening and assessments performed today your provider may have ordered immunizations, labs, imaging, and/or referrals for you. A list of these orders (if applicable) as well as your Preventive Care list are included within your After Visit Summary for your review. Other Preventive Recommendations:    · A preventive eye exam performed by an eye specialist is recommended every 1-2 years to screen for glaucoma; cataracts, macular degeneration, and other eye disorders. · A preventive dental visit is recommended every 6 months. · Try to get at least 150 minutes of exercise per week or 10,000 steps per day on a pedometer . · Order or download the FREE \"Exercise & Physical Activity: Your Everyday Guide\" from [employer]. Call 8-470.963.2545 or search [employer] online. · You need 7430-3595 mg of calcium and 7326-6008 IU of vitamin D per day. It is possible to meet your calcium requirement with diet alone, but a vitamin D supplement is usually necessary to meet this goal.  · When exposed to the sun, use a sunscreen that protects against both UVA and UVB radiation with an SPF of 30 or greater. Reapply every 2 to 3 hours or after sweating, drying off with a towel, or swimming. · Always wear a seat belt when traveling in a car. Always wear a helmet when riding a bicycle or motorcycle. Heart-Healthy Diet   Sodium, Fat, and Cholesterol Controlled Diet       What Is a Heart Healthy Diet?    A heart-healthy diet is one that limits sodium , certain types of fat , and cholesterol . This type of diet is recommended for:   People with any form of cardiovascular disease (eg, coronary heart disease , peripheral vascular disease , previous heart attack , previous stroke )   People with risk factors for cardiovascular disease, such as high blood pressure , high cholesterol , or diabetes   Anyone who wants to lower their risk of developing cardiovascular disease   Sodium    Sodium is a mineral found in many foods. In general, most people consume much more sodium than they need. Diets high in sodium can increase blood pressure and lead to edema (water retention). On a heart-healthy diet, you should consume no more than 2,300 mg (milligrams) of sodium per dayabout the amount in one teaspoon of table salt. The foods highest in sodium include table salt (about 50% sodium), processed foods, convenience foods, and preserved foods. Cholesterol    Cholesterol is a fat-like, waxy substance in your blood. Our bodies make some cholesterol. It is also found in animal products, with the highest amounts in fatty meat, egg yolks, whole milk, cheese, shellfish, and organ meats. On a heart-healthy diet, you should limit your cholesterol intake to less than 200 mg per day. It is normal and important to have some cholesterol in your bloodstream. But too much cholesterol can cause plaque to build up within your arteries, which can eventually lead to a heart attack or stroke. The two types of cholesterol that are most commonly referred to are:   Low-density lipoprotein (LDL) cholesterol  Also known as bad cholesterol, this is the cholesterol that tends to build up along your arteries. Bad cholesterol levels are increased by eating fats that are saturated or hydrogenated. Optimal level of this cholesterol is less than 100. Over 130 starts to get risky for heart disease.    High-density lipoprotein (HDL) cholesterol  Also known as good cholesterol, this type of cholesterol actually carries cholesterol away from your arteries and may, therefore, help lower your risk of having a heart attack. You want this level to be high (ideally greater than 60). It is a risk to have a level less than 40. You can raise this good cholesterol by eating olive oil, canola oil, avocados, or nuts. Exercise raises this level, too. Fat    Fat is calorie dense and packs a lot of calories into a small amount of food. Even though fats should be limited due to their high calorie content, not all fats are bad. In fact, some fats are quite healthful. Fat can be broken down into four main types. The good-for-you fats are:   Monounsaturated fat  found in oils such as olive and canola, avocados, and nuts and natural nut butters; can decrease cholesterol levels, while keeping levels of HDL cholesterol high   Polyunsaturated fat  found in oils such as safflower, sunflower, soybean, corn, and sesame; can decrease total cholesterol and LDL cholesterol   Omega-3 fatty acids  particularly those found in fatty fish (such as salmon, trout, tuna, mackerel, herring, and sardines); can decrease risk of arrhythmias, decrease triglyceride levels, and slightly lower blood pressure   The fats that you want to limit are:   Saturated fat  found in animal products, many fast foods, and a few vegetables; increases total blood cholesterol, including LDL levels   Animal fats that are saturated include: butter, lard, whole-milk dairy products, meat fat, and poultry skin   Vegetable fats that are saturated include: hydrogenated shortening, palm oil, coconut oil, cocoa butter   Hydrogenated or trans fat  found in margarine and vegetable shortening, most shelf stable snack foods, and fried foods; increases LDL and decreases HDL     It is generally recommended that you limit your total fat for the day to less than 30% of your total calories.  If you follow an 1800-calorie heart healthy diet, for example, this would mean 60 grams of fat or less per day. Saturated fat and trans fat in your diet raises your blood cholesterol the most, much more than dietary cholesterol does. For this reason, on a heart-healthy diet, less than 7% of your calories should come from saturated fat and ideally 0% from trans fat. On an 1800-calorie diet, this translates into less than 14 grams of saturated fat per day, leaving 46 grams of fat to come from mono- and polyunsaturated fats.    Food Choices on a Heart Healthy Diet   Food Category   Foods Recommended   Foods to Avoid   Grains   Breads and rolls without salted tops Most dry and cooked cereals Unsalted crackers and breadsticks Low-sodium or homemade breadcrumbs or stuffing All rice and pastas   Breads, rolls, and crackers with salted tops High-fat baked goods (eg, muffins, donuts, pastries) Quick breads, self-rising flour, and biscuit mixes Regular bread crumbs Instant hot cereals Commercially prepared rice, pasta, or stuffing mixes   Vegetables   Most fresh, frozen, and low-sodium canned vegetables Low-sodium and salt-free vegetable juices Canned vegetables if unsalted or rinsed   Regular canned vegetables and juices, including sauerkraut and pickled vegetables Frozen vegetables with sauces Commercially prepared potato and vegetable mixes   Fruits   Most fresh, frozen, and canned fruits All fruit juices   Fruits processed with salt or sodium   Milk   Nonfat or low-fat (1%) milk Nonfat or low-fat yogurt Cottage cheese, low-fat ricotta, cheeses labeled as low-fat and low-sodium   Whole milk Reduced-fat (2%) milk Malted and chocolate milk Full fat yogurt Most cheeses (unless low-fat and low salt) Buttermilk (no more than 1 cup per week)   Meats and Beans   Lean cuts of fresh or frozen beef, veal, lamb, or pork (look for the word loin) Fresh or frozen poultry without the skin Fresh or frozen fish and some shellfish Egg whites and egg substitutes (Limit whole eggs to three per week) Tofu Nuts or seeds (unsalted, dry-roasted), low-sodium peanut butter Dried peas, beans, and lentils   Any smoked, cured, salted, or canned meat, fish, or poultry (including gunn, chipped beef, cold cuts, hot dogs, sausages, sardines, and anchovies) Poultry skins Breaded and/or fried fish or meats Canned peas, beans, and lentils Salted nuts   Fats and Oils   Olive oil and canola oil Low-sodium, low-fat salad dressings and mayonnaise   Butter, margarine, coconut and palm oils, gunn fat   Snacks, Sweets, and Condiments   Low-sodium or unsalted versions of broths, soups, soy sauce, and condiments Pepper, herbs, and spices; vinegar, lemon, or lime juice Low-fat frozen desserts (yogurt, sherbet, fruit bars) Sugar, cocoa powder, honey, syrup, jam, and preserves Low-fat, trans-fat free cookies, cakes, and pies Randolph and animal crackers, fig bars, seema snaps   High-fat desserts Broth, soups, gravies, and sauces, made from instant mixes or other high-sodium ingredients Salted snack foods Canned olives Meat tenderizers, seasoning salt, and most flavored vinegars   Beverages   Low-sodium carbonated beverages Tea and coffee in moderation Soy milk   Commercially softened water   Suggestions   Make whole grains, fruits, and vegetables the base of your diet. Choose heart-healthy fats such as canola, olive, and flaxseed oil, and foods high in heart-healthy fats, such as nuts, seeds, soybeans, tofu, and fish. Eat fish at least twice per week; the fish highest in omega-3 fatty acids and lowest in mercury include salmon, herring, mackerel, sardines, and canned chunk light tuna. If you eat fish less than twice per week or have high triglycerides, talk to your doctor about taking fish oil supplements. Read food labels.    For products low in fat and cholesterol, look for fat free, low-fat, cholesterol free, saturated fat free, and trans fat freeAlso scan the Nutrition Facts Label, which lists saturated fat, trans fat, memory aids can help:   Calendars and day planners   Electronic organizers to store all sorts of helpful informationThese devices can \"beep\" to remind you of appointments. A book of days to record birthdays, anniversaries, and other occasions that occur on the same date every year   Detailed \"to-do\" lists and strategically placed sticky notes   Quick \"study\" sessionsBefore a gathering, review who will be there so their names will be fresh in your mind. Establish routinesFor example, keep your keys, wallet, and umbrella in the same place all the time or take medicine with your 8:00 AM glass of juice   Live a Healthy Life   Many actions that will keep your body strong will do the same for your mind. For example:   Talk to Your Doctor About Herbs and Supplements    Malnutrition and vitamin deficiencies can impair your mental function. For example, vitamin B12 deficiency can cause a range of symptoms, including confusion. But, what if your nutritional needs are being met? Can herbs and supplements still offer a benefit? Researchers have investigated a range of natural remedies, such as ginkgo , ginseng , and the supplement phosphatidylserine (PS). So far, though, the evidence is inconsistent as to whether these products can improve memory or thinking. If you are interested in taking herbs and supplements, talk to your doctor first because they may interact with other medicines that you are taking. Exercise Regularly    Among the many benefits of regular exercise are increased blood flow to the brain and decreased risk of certain diseases that can interfere with memory function. One study found that even moderate exercise has a beneficial effect. Examples of \"moderate\" exercise include:   Playing 18 holes of golf once a week, without a cart   Playing tennis twice a week   Walking one mile per day   Manage Stress    It can be tough to remember what is important when your mind is cluttered.  Make time for relaxation. Choose activities that calm you down, and make it routine. Manage Chronic Conditions    Side effects of high blood pressure , diabetes, and heart disease can interfere with mental function. Many of the lifestyle steps discussed here can help manage these conditions. Strive to eat a healthy diet, exercise regularly, get stress under control, and follow your doctor's advice for your condition. Minimize Medications    Talk to your doctor about the medicines that you take. Some may be unnecessary. Also, healthy lifestyle habits may lower the need for certain drugs. Last Reviewed: April 2010 Fitz Patel MD   Updated: 4/13/2010   ·     [address]       As we get older, changes in balance, gait, strength, vision, hearing, and cognition make even the most youthful senior more prone to accidents. Falls are one of the leading health risks for older people. This increased risk of falling is related to:   Aging process (eg, decreased muscle strength, slowed reflexes)   Higher incidence of chronic health problems (eg, arthritis, diabetes) that may limit mobility, agility or sensory awareness   Side effects of medicine (eg, dizziness, blurred vision)especially medicines like prescription pain medicines and drugs used to treat mental health conditions   Depending on the brittleness of your bones, the consequences of a fall can be serious and long lasting. Home Life   Research by the Association of Aging [facility]) shows that some home accidents among older adults can be prevented by making simple lifestyle changes and basic modifications and repairs to the home environment. Here are some lifestyle changes that experts recommend:   Have your hearing and vision checked regularly. Be sure to wear prescription glasses that are right for you. Speak to your doctor or pharmacist about the possible side effects of your medicines. A number of medicines can cause dizziness.    If you have problems with sleep, talk to your doctor. Limit your intake of alcohol. If necessary, use a cane or walker to help maintain your balance. Wear supportive, rubber-soled shoes, even at home. If you live in a region that gets wintry weather, you may want to put special cleats on your shoes to prevent you from slipping on the snow and ice. Exercise regularly to help maintain muscle tone, agility, and balance. Always hold the banister when going up or down stairs. Also, use  bars when getting in or out of the bath or shower, or using the toilet. To avoid dizziness, get up slowly from a lying down position. Sit up first, dangling your legs for a minute or two before rising to a standing position. Overall Home Safety Check   According to the Consumer [employer] Safety Comm[employer]'s \"Older Consumer Home Safety Checklist,\" it is important to check for potential hazards in each room. And remember, proper lighting is an essential factor in home safety. If you cannot see clearly, you are more likely to fall. Important questions to ask yourself include:   Are lamp, electric, extension, and telephone cords placed out of the flow of traffic and maintained in good condition? Have frayed cords been replaced? Are all small rugs and runners slip resistant? If not, you can secure them to the floor with a special double-sided carpet tape. Are smoke detectors properly locatedone on every floor of your home and one outside of every sleeping area? Are they in good working order? Are batteries replaced at least once a year? Do you have a well-maintained carbon monoxide detector outside every sleeping are in your home? Does your furniture layout leave plenty of space to maneuver between and around chairs, tables, beds, and sofas? Are hallways, stairs and passages between rooms well lit? Can you reach a lamp without getting out of bed? Are floor surfaces well maintained?  Shag rugs, high-pile carpeting, tile floors, and polished wood floors can be particularly slippery. Stairs should always have handrails and be carpeted or fitted with a non-skid tread. Is your telephone easily reachable. Is the cord safely tucked away? Room by Room   According to the Association of Aging, bathrooms and brown are the two most potentially hazardous rooms in your home. In the Kitchen    Be sure your stove is in proper working order and always make sure burners and the oven are off before you go out or go to sleep. Keep pots on the back burners, turn handles away from the front of the stove, and keep stove clean and free of grease build-up. Kitchen ventilation systems and range exhausts should be working properly. Keep flammable objects such as towels and pot holders away from the cooking area except when in use. Make sure kitchen curtains are tied back. Move cords and appliances away from the sink and hot surfaces. If extension cords are needed, install wiring guides so they do not hang over the sink, range, or working areas. Look for coffee pots, kettles and toaster ovens with automatic shut-offs. Keep a mop handy in the kitchen so you can wipe up spills instantly. You should also have a small fire extinguisher. Arrange your kitchen with frequently used items on lower shelves to avoid the need to stand on a stepstool to reach them. Make sure countertops are well-lit to avoid injuries while cutting and preparing food. In the Bathroom    Use a non-slip mat or decals in the tub and shower, since wet, soapy tile or porcelain surfaces are extremely slippery. Make sure bathroom rugs are non-skid or tape them firmly to the floor. Bathtubs should have at least one, preferably two, grab bars, firmly attached to structural supports in the wall. (Do not use built-in soap holders or glass shower doors as grab bars.)    Tub seats fitted with non-slip material on the legs allow you to wash sitting down.  For people with limited mobility, bathtub transfer benches allow you to slide safely into the tub. Raised toilet seats and toilet safety rails are helpful for those with knee or hip problems. In the [facility]    Make sure you use a nightlight and that the area around your bed is clear of potential obstacles. Be careful with electric blankets and never go to sleep with a heating pad, which can cause serious burns even if on a low setting. Use fire-resistant mattress covers and pillows, and NEVER smoke in bed. Keep a phone next to the bed that is programmed to dial 911 at the push of a button. If you have a chronic condition, you may want to sign on with an automatic call-in service. Typically the system includes a small pendant that connects directly to an emergency medical voice-response system. You should also make arrangements to stay in contact with someonefriend, neighbor, family memberon a regular schedule. Fire Prevention   According to the [employer]. (Smoke Alarms for Every) [address] [place] [address], senior citizens are one of the two highest risk groups for death and serious injuries due to residential fires. When cooking, wear short-sleeved items, never a bulky long-sleeved robe. The [facility]'s Safety Checklist for Older Consumers emphasizes the importance of checking basements, garages, workshops and storage areas for fire hazards, such as volatile liquids, piles of old rags or clothing and overloaded circuits. Never smoke in bed or when lying down on a couch or recliner chair. Small portable electric or kerosene heaters are responsible for many home fires and should be used cautiously if at all. If you do use one, be sure to keep them away from flammable materials. In case of fire, make sure you have a pre-established emergency exit plan. Have a professional check your fireplace and other fuel-burning appliances yearly.     Helping Hands   Baby boomers entering the valle years will continue to see the development of new products to help older adults live safely and independently in spite of age-related changes. Making Life More Livable  , by Steffany Luther, lists over 1,000 products for \"living well in the mature years,\" such as bathing and mobility aids, household security devices, ergonomically designed knives and peelers, and faucet valves and knobs for temperature control. Medical supply stores and organizations are good sources of information about products that improve your quality of life and insure your safety.      Last Reviewed: November 2009 Albertina Tobias MD   Updated: 3/7/2011     ·

## 2021-09-02 ENCOUNTER — TELEPHONE (OUTPATIENT)
Dept: FAMILY MEDICINE CLINIC | Age: 81
End: 2021-09-02

## 2021-09-02 NOTE — TELEPHONE ENCOUNTER
----- Message from Omar Pendleton sent at 9/2/2021  3:21 PM EDT -----  Subject: Appointment Request    Reason for Call: Routine (Patient Request) No Script    QUESTIONS  Type of Appointment? Established Patient  Reason for appointment request? No appointments available during search  Additional Information for Provider? Patient was in to get checked by   doctor [facility] today for having streaks on her leg, she was told her she   needs to follow up with PCP for her swelling where the incision is. She   said she has already been seen for swelling just needs to follow up with   PCP in addition. No avail until 10/6 and no option to pick other   providers. Patient is asking to be seen before 9/16/21, tried to call over   but was on hold. Can someone please try to call her to set up an appt. Thank You.   ---------------------------------------------------------------------------  --------------  Ori [facility] INFO  What is the best way for the office to contact you? OK to leave message on   voicemail  Preferred Call Back Phone Number? [phone]  ---------------------------------------------------------------------------  --------------  SCRIPT ANSWERS  Relationship to Patient? Self  (Is the patient requesting to see the provider for a procedure?)? No  (Is the patient requesting to see the provider urgently  today or   tomorrow. )? No  Have you been diagnosed with, awaiting test results for, or told that you   are suspected of having COVID-19 (Coronavirus)? (If patient has tested   negative or was tested as a requirement for work, school, or travel and   not based on symptoms, answer no)? No  Do you currently have flu-like symptoms including fever or chills, cough,   shortness of breath, difficulty breathing, or new loss of taste or smell? No  Have you had close contact with someone with COVID-19 in the last 14 days? No  (Service Expert  click yes below to proceed with [employer] As Usual   Scheduling)?  Yes

## 2021-09-02 NOTE — TELEPHONE ENCOUNTER
----- Message from [facility] sent at 9/2/2021 12:20 PM EDT -----  Subject: Appointment Request    Reason for Call: Routine (Patient Request) No Script    QUESTIONS  Type of Appointment? Established Patient  Reason for appointment request? Available appointments did not meet   patient need  Additional Information for Provider? Patient called on 09/02 in regards to   needing an appt to see her provider, either before 09/14 or 09/15. Reason   due is because she has had surgeries done, hip and both knees from another   provider and he is stuck on something and wants the pt to be checked out   by her provider. Faraz Lucia stated that when you call, if it goes to voicemail   to go ahead and to please leave a detailed message for her in regards to   an appt.   ---------------------------------------------------------------------------  --------------  [facility]  What is the best way for the office to contact you? OK to leave message on   [employer]  Preferred Call Back Phone Number? [phone]  ---------------------------------------------------------------------------  --------------  SCRIPT ANSWERS  Relationship to Patient? Self  (Is the patient requesting to see the provider for a procedure?)? No  (Is the patient requesting to see the provider urgently - today or   tomorrow. )? No  Have you been diagnosed with, awaiting test results for, or told that you   are suspected of having COVID-19 (Coronavirus)? (If patient has tested   negative or was tested as a requirement for work, school, or travel and   not based on symptoms, answer no)? No  Do you currently have flu-like symptoms including fever or chills, cough,   shortness of breath, difficulty breathing, or new loss of taste or smell? No  Have you had close contact with someone with COVID-19 in the last 14 days? No  (Service Expert - click yes below to proceed with [employer] As Usual   Scheduling)?  Yes

## 2021-10-14 NOTE — TELEPHONE ENCOUNTER
Patient called requesting appt to be eval for pinching, reddish streaks down leg which she saw Dr. Kathryn Dawkins for.   Appt made 10/19/21 at 3:30 pm.    Last seen 5/10/2021  Next appt 10/19/2021

## 2021-10-19 ENCOUNTER — OFFICE VISIT (OUTPATIENT)
Dept: FAMILY MEDICINE CLINIC | Age: 81
End: 2021-10-19
Payer: MEDICARE

## 2021-10-19 VITALS
DIASTOLIC BLOOD PRESSURE: 74 MMHG | HEIGHT: 64 IN | SYSTOLIC BLOOD PRESSURE: 132 MMHG | RESPIRATION RATE: 20 BRPM | HEART RATE: 80 BPM | OXYGEN SATURATION: 97 % | BODY MASS INDEX: 22.02 KG/M2 | WEIGHT: 129 LBS

## 2021-10-19 DIAGNOSIS — Z23 NEEDS FLU SHOT: ICD-10-CM

## 2021-10-19 DIAGNOSIS — L30.9 DERMATITIS: Primary | ICD-10-CM

## 2021-10-19 PROCEDURE — G8399 PT W/DXA RESULTS DOCUMENT: HCPCS | Performed by: FAMILY MEDICINE

## 2021-10-19 PROCEDURE — 4040F PNEUMOC VAC/ADMIN/RCVD: CPT | Performed by: FAMILY MEDICINE

## 2021-10-19 PROCEDURE — G8427 DOCREV CUR MEDS BY ELIG CLIN: HCPCS | Performed by: FAMILY MEDICINE

## 2021-10-19 PROCEDURE — G8484 FLU IMMUNIZE NO ADMIN: HCPCS | Performed by: FAMILY MEDICINE

## 2021-10-19 PROCEDURE — 1123F ACP DISCUSS/DSCN MKR DOCD: CPT | Performed by: FAMILY MEDICINE

## 2021-10-19 PROCEDURE — 1036F TOBACCO NON-USER: CPT | Performed by: FAMILY MEDICINE

## 2021-10-19 PROCEDURE — G0008 ADMIN INFLUENZA VIRUS VAC: HCPCS | Performed by: FAMILY MEDICINE

## 2021-10-19 PROCEDURE — G8420 CALC BMI NORM PARAMETERS: HCPCS | Performed by: FAMILY MEDICINE

## 2021-10-19 PROCEDURE — 90694 VACC AIIV4 NO PRSRV 0.5ML IM: CPT | Performed by: FAMILY MEDICINE

## 2021-10-19 PROCEDURE — 1090F PRES/ABSN URINE INCON ASSESS: CPT | Performed by: FAMILY MEDICINE

## 2021-10-19 PROCEDURE — 99214 OFFICE O/P EST MOD 30 MIN: CPT | Performed by: FAMILY MEDICINE

## 2021-10-19 RX ORDER — TRIAMCINOLONE ACETONIDE 1 MG/G
CREAM TOPICAL
Qty: 80 G | Refills: 0 | Status: SHIPPED
Start: 2021-10-19 | End: 2021-12-16 | Stop reason: ALTCHOICE

## 2021-10-19 RX ORDER — LANOLIN ALCOHOL/MO/W.PET/CERES
3 CREAM (GRAM) TOPICAL NIGHTLY
COMMUNITY

## 2021-10-19 SDOH — ECONOMIC STABILITY: FOOD INSECURITY: WITHIN THE PAST 12 MONTHS, YOU WORRIED THAT YOUR FOOD WOULD RUN OUT BEFORE YOU GOT MONEY TO BUY MORE.: NEVER TRUE

## 2021-10-19 SDOH — ECONOMIC STABILITY: FOOD INSECURITY: WITHIN THE PAST 12 MONTHS, THE FOOD YOU BOUGHT JUST DIDN'T LAST AND YOU DIDN'T HAVE MONEY TO GET MORE.: NEVER TRUE

## 2021-10-19 ASSESSMENT — ENCOUNTER SYMPTOMS
DIARRHEA: 0
NAUSEA: 0
SHORTNESS OF BREATH: 0
VOMITING: 0

## 2021-10-19 ASSESSMENT — LIFESTYLE VARIABLES: HOW OFTEN DO YOU HAVE A DRINK CONTAINING ALCOHOL: NEVER

## 2021-10-19 NOTE — PATIENT INSTRUCTIONS
Patient Education        Dermatitis: Care Instructions  Your Care Instructions  Dermatitis is the general name used for any rash or inflammation of the skin. Different kinds of dermatitis cause different kinds of rashes. Common causes of a rash include new medicines, plants (such as poison oak or poison ivy), heat, and stress. Certain illnesses can also cause a rash. An allergic reaction to something that touches your skin, such as latex, nickel, or poison ivy, is called contact dermatitis. Contact dermatitis may also be caused by something that irritates the skin, such as bleach, a chemical, or soap. These types of rashes cannot be spread from person to person. How long your rash will last depends on what caused it. Rashes may last a few days or months. Follow-up care is a key part of your treatment and safety. Be sure to make and go to all appointments, and call your doctor if you are having problems. It's also a good idea to know your test results and keep a list of the medicines you take. How can you care for yourself at home? · Do not scratch the rash. Cut your nails short, and file them smooth. Or wear gloves if this helps keep you from scratching. · Wash the area with water only. Pat dry. · Put cold, wet cloths on the rash to reduce itching. · Keep cool, and stay out of the sun. · Leave the rash open to the air as much as possible. · If the rash itches, use hydrocortisone cream. Follow the directions on the label. Calamine lotion may help for plant rashes. · Take an over-the-counter antihistamine, such as diphenhydramine (Benadryl) or loratadine (Claritin), to help calm the itching. Read and follow all instructions on the label. · If your doctor prescribed a cream, use it as directed. If your doctor prescribed medicine, take it exactly as directed. When should you call for help?    Call your doctor now or seek immediate medical care if:    · You have symptoms of infection, such as:  ? Increased pain, swelling, warmth, or redness. ? Red streaks leading from the area. ? Pus draining from the area. ? A fever.     · You have joint pain along with the rash. Watch closely for changes in your health, and be sure to contact your doctor if:    · Your rash is changing or getting worse.     · You are not getting better as expected. Where can you learn more? Go to https://[employer]bs[employer]. org and sign in to your [employer] account. Enter [phone] in the [facility] box to learn more about \"Dermatitis: Care Instructions. \"     If you do not have an account, please click on the \"Sign Up Now\" link. Current as of: March 3, 2021               Content Version: 13.0  © 1019-8602 Healthwise, Incorporated. Care instructions adapted under license by [facility] ([facility]). If you have questions about a medical condition or this instruction, always ask your healthcare professional. [place] [address] any warranty or liability for your use of this information.

## 2021-10-19 NOTE — PROGRESS NOTES
[address] [facility], [address]   [address] [facility]   Jameel Washington MD     Patient: Nellie Limon Birth: 1940  Visit Date: 10/19/21    Damian Schmidt is a [de-identified]y.o. year old female here today for   Chief Complaint   Patient presents with    Rash     rt hip get a red streak  dr Carmen Lei said you need to see her [facility]       HPI  Rash  This is a new problem. Episode onset: Started within the past 2 months  The problem has been waxing and waning since onset. The affected locations include the right hip. The rash is characterized by redness and pain (+warmth to touch). She was exposed to nothing. Pertinent negatives include no diarrhea, fatigue, shortness of breath or vomiting. Past treatments include nothing. Patient recently saw her orthopedic surgeon for right hip about 6 weeks ago. Patient was started on antibiotics. Recent lab results reviewed with patient, including BMP, liver function panel and CBC which are not remarkable. Review of Systems   Constitutional: Negative for chills and fatigue. Respiratory: Negative for shortness of breath. Cardiovascular: Negative for chest pain, palpitations and leg swelling. Gastrointestinal: Negative for diarrhea, nausea and vomiting. Genitourinary: Negative for difficulty urinating, dysuria and frequency. Skin: Positive for rash. Past medical, surgical, social and/or family historyreviewed, updated as needed, and are non-contributory (unless otherwise stated). Medications, allergies, and problem list also reviewed and updated as needed in patient's record. Current Outpatient Medications   Medication Sig Dispense Refill    melatonin 3 MG TABS tablet Take 3 mg by mouth nightly as needed      triamcinolone (KENALOG) 0.1 % cream Apply topically 2 times daily for up to 2 weeks then as needed for flareups.  80 g 0    diazePAM (VALIUM) 5 MG tablet Take 1 tablet by mouth nightly as needed for Sleep. 90 tablet 1    lamoTRIgine (LAMICTAL) 100 MG tablet Take 1 tablet by mouth 3 times daily 90 tablet 0    Acetaminophen (TYLENOL ARTHRITIS PAIN PO) Take by mouth      primidone (MYSOLINE) 250 MG tablet Take 250 mg by mouth 3 times daily. No current facility-administered medications for this visit. Wt Readings from Last 3 Encounters:   10/19/21 129 lb (58.5 kg)   05/10/21 128 lb (58.1 kg)   02/01/21 136 lb (61.7 kg)                   Vitals:    10/19/21 1525   BP: 132/74   Pulse: 80   Resp: 20   SpO2: 97%       Physical Exam  Vitals reviewed. Constitutional:       Appearance: She is well-developed. Cardiovascular:      Rate and Rhythm: Normal rate and regular rhythm. Heart sounds: Normal heart sounds. No murmur heard. No friction rub. Pulmonary:      Effort: Pulmonary effort is normal. No respiratory distress. Breath sounds: Normal breath sounds. No wheezing or rales. Abdominal:      General: Bowel sounds are normal. There is no distension. Palpations: Abdomen is soft. Tenderness: There is no abdominal tenderness. There is no guarding or rebound. Skin:     General: Skin is warm and dry. Findings: Rash (Slightly erythematous and scaly papules right superior lateral hip) present. Neurological:      Mental Status: She is alert and oriented to person, place, and time. ASSESSMENT/PLAN  Noa Kothari was seen today for rash. Diagnoses and all orders for this visit:    Dermatitis  -     triamcinolone (KENALOG) 0.1 % cream; Apply topically 2 times daily for up to 2 weeks then as needed for flareups. Needs flu shot  -     INFLUENZA, QUADV, ADJUVANTED, 65 YRS =, IM, PF, PREFILL SYR, 0.5ML (FLUAD)            Return if symptoms worsen or fail to improve. or sooner if necessary. I have reviewed my findings and recommendations with Noa Kothari.      Landon Washington MD, M.D

## 2021-12-15 ENCOUNTER — APPOINTMENT (OUTPATIENT)
Dept: CT IMAGING | Age: 81
DRG: 305 | End: 2021-12-15
Payer: MEDICARE

## 2021-12-15 ENCOUNTER — HOSPITAL ENCOUNTER (INPATIENT)
Age: 81
LOS: 3 days | Discharge: HOME | DRG: 305 | End: 2021-12-18
Attending: EMERGENCY MEDICINE | Admitting: INTERNAL MEDICINE
Payer: MEDICARE

## 2021-12-15 DIAGNOSIS — I63.9 ACUTE ISCHEMIC STROKE (HCC): Primary | ICD-10-CM

## 2021-12-15 LAB
ALBUMIN SERPL-MCNC: 4.5 G/DL (ref 3.5–5.2)
ALP BLD-CCNC: 104 U/L (ref 35–104)
ALT SERPL-CCNC: 16 U/L (ref 0–32)
ANION GAP SERPL CALCULATED.3IONS-SCNC: 13 MMOL/L (ref 7–16)
APTT: 35.2 SEC (ref 24.5–35.1)
AST SERPL-CCNC: 24 U/L (ref 0–31)
BASOPHILS ABSOLUTE: 0.01 E9/L (ref 0–0.2)
BASOPHILS RELATIVE PERCENT: 0.2 % (ref 0–2)
BILIRUB SERPL-MCNC: 0.2 MG/DL (ref 0–1.2)
BUN BLDV-MCNC: 11 MG/DL (ref 6–23)
CALCIUM SERPL-MCNC: 8.8 MG/DL (ref 8.6–10.2)
CHLORIDE BLD-SCNC: 98 MMOL/L (ref 98–107)
CO2: 26 MMOL/L (ref 22–29)
CREAT SERPL-MCNC: 0.6 MG/DL (ref 0.5–1)
EOSINOPHILS ABSOLUTE: 0.17 E9/L (ref 0.05–0.5)
EOSINOPHILS RELATIVE PERCENT: 3 % (ref 0–6)
GFR AFRICAN AMERICAN: >60
GFR NON-AFRICAN AMERICAN: >60 ML/MIN/1.73
GLUCOSE BLD-MCNC: 115 MG/DL (ref 74–99)
HCT VFR BLD CALC: 42.1 % (ref 34–48)
HEMOGLOBIN: 13.9 G/DL (ref 11.5–15.5)
IMMATURE GRANULOCYTES #: 0.02 E9/L
IMMATURE GRANULOCYTES %: 0.4 % (ref 0–5)
INR BLD: 1
LYMPHOCYTES ABSOLUTE: 1.72 E9/L (ref 1.5–4)
LYMPHOCYTES RELATIVE PERCENT: 30.2 % (ref 20–42)
MCH RBC QN AUTO: 29.5 PG (ref 26–35)
MCHC RBC AUTO-ENTMCNC: 33 % (ref 32–34.5)
MCV RBC AUTO: 89.4 FL (ref 80–99.9)
METER GLUCOSE: 119 MG/DL (ref 74–99)
MONOCYTES ABSOLUTE: 0.53 E9/L (ref 0.1–0.95)
MONOCYTES RELATIVE PERCENT: 9.3 % (ref 2–12)
NEUTROPHILS ABSOLUTE: 3.25 E9/L (ref 1.8–7.3)
NEUTROPHILS RELATIVE PERCENT: 56.9 % (ref 43–80)
PDW BLD-RTO: 12.6 FL (ref 11.5–15)
PLATELET # BLD: 194 E9/L (ref 130–450)
PMV BLD AUTO: 10.2 FL (ref 7–12)
POTASSIUM SERPL-SCNC: 3.5 MMOL/L (ref 3.5–5)
PROTHROMBIN TIME: 11.9 SEC (ref 9.3–12.4)
RBC # BLD: 4.71 E12/L (ref 3.5–5.5)
SODIUM BLD-SCNC: 137 MMOL/L (ref 132–146)
TOTAL PROTEIN: 7.1 G/DL (ref 6.4–8.3)
TROPONIN, HIGH SENSITIVITY: 8 NG/L (ref 0–9)
TROPONIN, HIGH SENSITIVITY: 9 NG/L (ref 0–9)
WBC # BLD: 5.7 E9/L (ref 4.5–11.5)

## 2021-12-15 PROCEDURE — 70450 CT HEAD/BRAIN W/O DYE: CPT

## 2021-12-15 PROCEDURE — 85610 PROTHROMBIN TIME: CPT

## 2021-12-15 PROCEDURE — 84484 ASSAY OF TROPONIN QUANT: CPT

## 2021-12-15 PROCEDURE — 2500000003 HC RX 250 WO HCPCS: Performed by: EMERGENCY MEDICINE

## 2021-12-15 PROCEDURE — 80053 COMPREHEN METABOLIC PANEL: CPT

## 2021-12-15 PROCEDURE — 70496 CT ANGIOGRAPHY HEAD: CPT

## 2021-12-15 PROCEDURE — 96374 THER/PROPH/DIAG INJ IV PUSH: CPT

## 2021-12-15 PROCEDURE — 1200000000 HC SEMI PRIVATE

## 2021-12-15 PROCEDURE — 2580000003 HC RX 258: Performed by: EMERGENCY MEDICINE

## 2021-12-15 PROCEDURE — 85025 COMPLETE CBC W/AUTO DIFF WBC: CPT

## 2021-12-15 PROCEDURE — 36415 COLL VENOUS BLD VENIPUNCTURE: CPT

## 2021-12-15 PROCEDURE — 0042T CT BRAIN PERFUSION: CPT

## 2021-12-15 PROCEDURE — 6360000002 HC RX W HCPCS: Performed by: INTERNAL MEDICINE

## 2021-12-15 PROCEDURE — 6370000000 HC RX 637 (ALT 250 FOR IP): Performed by: EMERGENCY MEDICINE

## 2021-12-15 PROCEDURE — 6370000000 HC RX 637 (ALT 250 FOR IP): Performed by: INTERNAL MEDICINE

## 2021-12-15 PROCEDURE — 70498 CT ANGIOGRAPHY NECK: CPT

## 2021-12-15 PROCEDURE — 99285 EMERGENCY DEPT VISIT HI MDM: CPT

## 2021-12-15 PROCEDURE — 99448 NTRPROF PH1/NTRNET/EHR 21-30: CPT | Performed by: PSYCHIATRY & NEUROLOGY

## 2021-12-15 PROCEDURE — 82962 GLUCOSE BLOOD TEST: CPT

## 2021-12-15 PROCEDURE — 93005 ELECTROCARDIOGRAM TRACING: CPT | Performed by: EMERGENCY MEDICINE

## 2021-12-15 PROCEDURE — 6360000004 HC RX CONTRAST MEDICATION: Performed by: RADIOLOGY

## 2021-12-15 PROCEDURE — 85730 THROMBOPLASTIN TIME PARTIAL: CPT

## 2021-12-15 RX ORDER — ASPIRIN 81 MG/1
81 TABLET, CHEWABLE ORAL DAILY
Status: DISCONTINUED | OUTPATIENT
Start: 2021-12-15 | End: 2021-12-18 | Stop reason: HOSPADM

## 2021-12-15 RX ORDER — CLOPIDOGREL BISULFATE 75 MG/1
75 TABLET ORAL DAILY
Status: DISCONTINUED | OUTPATIENT
Start: 2021-12-16 | End: 2021-12-18 | Stop reason: HOSPADM

## 2021-12-15 RX ORDER — LAMOTRIGINE 100 MG/1
100 TABLET ORAL ONCE
Status: COMPLETED | OUTPATIENT
Start: 2021-12-15 | End: 2021-12-15

## 2021-12-15 RX ORDER — MECLIZINE HCL 12.5 MG/1
12.5 TABLET ORAL ONCE
Status: COMPLETED | OUTPATIENT
Start: 2021-12-15 | End: 2021-12-15

## 2021-12-15 RX ORDER — PRIMIDONE 250 MG/1
250 TABLET ORAL 3 TIMES DAILY
Status: DISCONTINUED | OUTPATIENT
Start: 2021-12-15 | End: 2021-12-18 | Stop reason: HOSPADM

## 2021-12-15 RX ORDER — LABETALOL HYDROCHLORIDE 5 MG/ML
5 INJECTION, SOLUTION INTRAVENOUS ONCE
Status: COMPLETED | OUTPATIENT
Start: 2021-12-15 | End: 2021-12-15

## 2021-12-15 RX ORDER — 0.9 % SODIUM CHLORIDE 0.9 %
1000 INTRAVENOUS SOLUTION INTRAVENOUS ONCE
Status: COMPLETED | OUTPATIENT
Start: 2021-12-15 | End: 2021-12-15

## 2021-12-15 RX ORDER — LAMOTRIGINE 100 MG/1
100 TABLET ORAL 3 TIMES DAILY
Status: DISCONTINUED | OUTPATIENT
Start: 2021-12-16 | End: 2021-12-18 | Stop reason: HOSPADM

## 2021-12-15 RX ORDER — ONDANSETRON 2 MG/ML
4 INJECTION INTRAMUSCULAR; INTRAVENOUS EVERY 6 HOURS PRN
Status: DISCONTINUED | OUTPATIENT
Start: 2021-12-15 | End: 2021-12-18 | Stop reason: HOSPADM

## 2021-12-15 RX ORDER — CLOPIDOGREL 300 MG/1
300 TABLET, FILM COATED ORAL ONCE
Status: COMPLETED | OUTPATIENT
Start: 2021-12-15 | End: 2021-12-15

## 2021-12-15 RX ORDER — ASPIRIN 81 MG/1
324 TABLET, CHEWABLE ORAL ONCE
Status: COMPLETED | OUTPATIENT
Start: 2021-12-15 | End: 2021-12-15

## 2021-12-15 RX ORDER — ATORVASTATIN CALCIUM 40 MG/1
40 TABLET, FILM COATED ORAL NIGHTLY
Status: DISCONTINUED | OUTPATIENT
Start: 2021-12-15 | End: 2021-12-18 | Stop reason: HOSPADM

## 2021-12-15 RX ORDER — LANOLIN ALCOHOL/MO/W.PET/CERES
3 CREAM (GRAM) TOPICAL NIGHTLY PRN
Status: DISCONTINUED | OUTPATIENT
Start: 2021-12-15 | End: 2021-12-18 | Stop reason: HOSPADM

## 2021-12-15 RX ADMIN — ASPIRIN 324 MG: 81 TABLET, CHEWABLE ORAL at 17:32

## 2021-12-15 RX ADMIN — ENOXAPARIN SODIUM 40 MG: 100 INJECTION SUBCUTANEOUS at 20:35

## 2021-12-15 RX ADMIN — PRIMIDONE 250 MG: 250 TABLET ORAL at 21:06

## 2021-12-15 RX ADMIN — ATORVASTATIN CALCIUM 40 MG: 40 TABLET, FILM COATED ORAL at 20:34

## 2021-12-15 RX ADMIN — CLOPIDOGREL BISULFATE 300 MG: 300 TABLET, FILM COATED ORAL at 17:34

## 2021-12-15 RX ADMIN — MECLIZINE 12.5 MG: 12.5 TABLET ORAL at 17:35

## 2021-12-15 RX ADMIN — IOPAMIDOL 150 ML: 755 INJECTION, SOLUTION INTRAVENOUS at 16:26

## 2021-12-15 RX ADMIN — METOPROLOL TARTRATE 25 MG: 25 TABLET, FILM COATED ORAL at 20:34

## 2021-12-15 RX ADMIN — LAMOTRIGINE 100 MG: 100 TABLET ORAL at 21:06

## 2021-12-15 RX ADMIN — Medication 3 MG: at 20:34

## 2021-12-15 RX ADMIN — LABETALOL HYDROCHLORIDE 5 MG: 5 INJECTION INTRAVENOUS at 16:40

## 2021-12-15 RX ADMIN — SODIUM CHLORIDE 1000 ML: 9 INJECTION, SOLUTION INTRAVENOUS at 17:34

## 2021-12-15 ASSESSMENT — ENCOUNTER SYMPTOMS
COLOR CHANGE: 0
SHORTNESS OF BREATH: 0
RHINORRHEA: 0
BLOOD IN STOOL: 0
COUGH: 0
NAUSEA: 1
BACK PAIN: 0
VOMITING: 1
ABDOMINAL PAIN: 0

## 2021-12-15 NOTE — ED PROVIDER NOTES
ED PROVIDER NOTE    Chief Complaint   Patient presents with    Dizziness    Hypertension       HPI:  12/15/21,   Time: 4:21 PM LINA Aceves is a [de-identified] y.o. female presenting to the ED for dizziness. Acute onset around 1230 or 100 pm while cooking at home, persistent since onset, moderate in severity, no aggravating/alleviating factors. Feels lightheaded, no room spinning but feels like she is falling and has been unable to keep her balance while ambulating since time of onset. Associated nausea and vomiting. No recent fall or injury. No known hx of CVA. Not on anticoagulants. Chart review: hx of epilepsy    Review of Systems:     Review of Systems   Constitutional: Negative for appetite change, chills and fever. HENT: Negative for congestion and rhinorrhea. Eyes: Negative for visual disturbance. Respiratory: Negative for cough and shortness of breath. Cardiovascular: Negative for chest pain. Gastrointestinal: Positive for nausea and vomiting. Negative for abdominal pain and blood in stool. Genitourinary: Negative for decreased urine volume and difficulty urinating. Musculoskeletal: Negative for back pain and neck pain. Skin: Negative for color change. Neurological: Positive for dizziness and light-headedness. Negative for syncope, weakness, numbness and headaches.         --------------------------------------------- PAST HISTORY ---------------------------------------------  Past Medical History:   Past Medical History:   Diagnosis Date    Diverticulosis     Hemorrhoids, internal     Irritable bowel syndrome     Osteoarthritis     rt knee    Osteoporosis     Seizures ([facility] [address]     complex partial  last seizure 2008?     Urticaria, chronic        Past Surgical History:   Past Surgical History:   Procedure Laterality Date    APPENDECTOMY      CARPAL TUNNEL RELEASE Left     CHOLECYSTECTOMY      COLONOSCOPY      FINGER TRIGGER RELEASE Left     HC INJECT OTHER PERPHRL NERV Right 10/8/2019    RIGHT HIP INJECTION (CPT 72368) performed by Chandan Fall MD at [facility] Right 9/19/2019    RIGHT SACROILIAC JOINT INJECTION performed by Chandan Fall MD at [address] [place], [place] [address],[address] Right 10/31/2014    Laparoscopic right inguinal hernia repair with mesh     HYSTERECTOMY      vaginal    KNEE ARTHROSCOPY      right    NERVE BLOCK Right 09/19/2019    sacroiliac joint    NERVE BLOCK Right 10/08/2019    hip injection    TONSILLECTOMY AND ADENOIDECTOMY      TOTAL HIP ARTHROPLASTY Right 01/2020       Social History:   Social History     Socioeconomic History    Marital status:      Spouse name: Not on file    Number of children: Not on file    Years of education: Not on file    Highest education level: Not on file   Occupational History    Not on file   Tobacco Use    Smoking status: Never Smoker    Smokeless tobacco: Never Used   Substance and Sexual Activity    Alcohol use: No     Comment: rarely    Drug use: No    Sexual activity: Not on file   Other Topics Concern    Not on file   Social History Narrative    Not on file     Social Determinants of Health     Financial Resource Strain:     Difficulty of Paying Living Expenses: Not on file   Food Insecurity: No Food Insecurity    Worried About Running Out of Food in the Last Year: Never true    Shabbir of Food in the Last Year: Never true   Transportation Needs:     Lack of Transportation (Medical): Not on file    Lack of Transportation (Non-Medical):  Not on file   Physical Activity:     Days of Exercise per Week: Not on file    Minutes of Exercise per Session: Not on file   Stress: No Stress Concern Present    Feeling of Stress : Not at all   Social Connections:     Frequency of Communication with Friends and Family: Not on file    Frequency of Social Gatherings with Friends and Family: Not on file    Attends Episcopal Services: Not on file   Brian Active Member of Clubs or Organizations: Not on file    Attends Club or Organization Meetings: Not on file    Marital Status: Not on file   Intimate Partner Violence:     Fear of Current or Ex-Partner: Not on file    Emotionally Abused: Not on file    Physically Abused: Not on file    Sexually Abused: Not on file   Housing Stability:     Unable to Pay for Housing in the Last Year: Not on file    Number of [place] in the Last Year: Not on file    Unstable Housing in the Last Year: Not on file       Family History:   Family History   Problem Relation Age of Onset    Coronary Art Dis Mother     Coronary Art Dis Father     Coronary Art Dis Brother     Cancer Brother         lymph nodes and lung    Diabetes Maternal Aunt        The patients home medications have been reviewed. Allergies: Allergies   Allergen Reactions    Penicillins Anaphylaxis and Swelling    Dextromethorphan Hives    Factive [Gemifloxacin Mesylate]     Food Hives     shrimp    Mucinex Dm [Dm-Guaifenesin Er] Hives    Other      THORPHAN    Shellfish-Derived Products Swelling           ---------------------------------------------------PHYSICAL EXAM--------------------------------------    BP (!) 187/98   Pulse 72   Temp 98.4 °F (36.9 °C) (Oral)   Resp 19   Ht 5' 4\" (1.626 m)   Wt 129 lb (58.5 kg)   SpO2 99%   BMI 22.14 kg/m²     Physical Exam  Vitals and nursing note reviewed. Constitutional:       General: She is not in acute distress. Appearance: She is not toxic-appearing. HENT:      Mouth/Throat:      Mouth: Mucous membranes are moist.   Eyes:      General: No scleral icterus. Extraocular Movements: Extraocular movements intact. Pupils: Pupils are equal, round, and reactive to light. Comments: Dysconjugate gaze (baseline per pt)   Cardiovascular:      Rate and Rhythm: Normal rate and regular rhythm. Pulses: Normal pulses. Heart sounds: Normal heart sounds.  No murmur heard.      Pulmonary:      Effort: Pulmonary effort is normal. No respiratory distress. Breath sounds: Normal breath sounds. No wheezing or rales. Abdominal:      General: There is no distension. Palpations: Abdomen is soft. Tenderness: There is no abdominal tenderness. Musculoskeletal:         General: No swelling or tenderness. Normal range of motion. Cervical back: Normal range of motion and neck supple. Comments: Radial, DP, and PT pulses 2+ bilaterally. Skin:     General: Skin is warm and dry. Neurological:      Mental Status: She is alert and oriented to person, place, and time. Comments: PERRL, EOMI  L facial droop. Strength 5/5 bilaterally throughout all extremities   Sensation diminished in LUE/LLE compared w/ RUE/RLE  No dysmetria.              -------------------------------------------------- RESULTS -------------------------------------------------  I have personally reviewed all laboratory and imaging results for this patient. Results are listed below. LABS:  Labs Reviewed   COMPREHENSIVE METABOLIC PANEL - Abnormal; Notable for the following components:       Result Value    Glucose 115 (*)     All other components within normal limits   APTT - Abnormal; Notable for the following components:    aPTT 35.2 (*)     All other components within normal limits   POCT GLUCOSE - Abnormal; Notable for the following components:    Meter Glucose 119 (*)     All other components within normal limits   CBC WITH AUTO DIFFERENTIAL   TROPONIN   PROTIME-INR   POCT GLUCOSE       RADIOLOGY:  Interpreted personally and by Radiologist.  CT HEAD WO CONTRAST   Final Result   NONCONTRAST CT OF THE HEAD:      1.  No acute intracranial abnormality. CT PERFUSION OF THE HEAD:      1. No evidence of ischemia. CTA OF THE HEAD:      1. Focal MODERATE TO SEVERE GRADE STENOSIS IN A M2 BRANCH OF THE LEFT MCA. 2. Otherwise, unremarkable CTA of the brain. CTA OF THE NECK:      1.  The cervical carotid and vertebral arteries are widely patent. 2. Tortuous and ectatic right innominate artery, which bifurcates in the   lower anterior neck deform the right common carotid and subclavian arteries. The right subclavian artery is also ectatic. Findings are grossly stable as   of the previous CTA from 01/23/2020.   3. Multinodular thyroid gland with the dominant nodule measuring   approximately 2.1 cm. Sonographic evaluation recommended. Critical results: Findings of the noncontrast enhanced CT were called by Dr. Sachin Pittman to Dr. Ted Martinez on 12/15/2021 at 17:01. RECOMMENDATIONS:   Unavailable         CTA HEAD W CONTRAST   Final Result   NONCONTRAST CT OF THE HEAD:      1.  No acute intracranial abnormality. CT PERFUSION OF THE HEAD:      1. No evidence of ischemia. CTA OF THE HEAD:      1. Focal MODERATE TO SEVERE GRADE STENOSIS IN A M2 BRANCH OF THE LEFT MCA. 2. Otherwise, unremarkable CTA of the brain. CTA OF THE NECK:      1. The cervical carotid and vertebral arteries are widely patent. 2. Tortuous and ectatic right innominate artery, which bifurcates in the   lower anterior neck deform the right common carotid and subclavian arteries. The right subclavian artery is also ectatic. Findings are grossly stable as   of the previous CTA from 01/23/2020.   3. Multinodular thyroid gland with the dominant nodule measuring   approximately 2.1 cm. Sonographic evaluation recommended. Critical results: Findings of the noncontrast enhanced CT were called by Dr. Sachin Pittman to Dr. Ted Martinez on 12/15/2021 at 17:01. RECOMMENDATIONS:   Unavailable         CTA NECK W CONTRAST   Final Result   NONCONTRAST CT OF THE HEAD:      1.  No acute intracranial abnormality. CT PERFUSION OF THE HEAD:      1. No evidence of ischemia. CTA OF THE HEAD:      1. Focal MODERATE TO SEVERE GRADE STENOSIS IN A M2 BRANCH OF THE LEFT MCA. 2. Otherwise, unremarkable CTA of the brain.    CTA OF THE NECK:      1. The cervical carotid and vertebral arteries are widely patent. 2. Tortuous and ectatic right innominate artery, which bifurcates in the   lower anterior neck deform the right common carotid and subclavian arteries. The right subclavian artery is also ectatic. Findings are grossly stable as   of the previous CTA from 01/23/2020.   3. Multinodular thyroid gland with the dominant nodule measuring   approximately 2.1 cm. Sonographic evaluation recommended. Critical results: Findings of the noncontrast enhanced CT were called by Dr. Jason Urrutia to Dr. Josse Arriaga on 12/15/2021 at 17:01. RECOMMENDATIONS:   Unavailable         CT BRAIN PERFUSION   Final Result   NONCONTRAST CT OF THE HEAD:      1.  No acute intracranial abnormality. CT PERFUSION OF THE HEAD:      1. No evidence of ischemia. CTA OF THE HEAD:      1. Focal MODERATE TO SEVERE GRADE STENOSIS IN A M2 BRANCH OF THE LEFT MCA. 2. Otherwise, unremarkable CTA of the brain. CTA OF THE NECK:      1. The cervical carotid and vertebral arteries are widely patent. 2. Tortuous and ectatic right innominate artery, which bifurcates in the   lower anterior neck deform the right common carotid and subclavian arteries. The right subclavian artery is also ectatic. Findings are grossly stable as   of the previous CTA from 01/23/2020.   3. Multinodular thyroid gland with the dominant nodule measuring   approximately 2.1 cm. Sonographic evaluation recommended. Critical results: Findings of the noncontrast enhanced CT were called by Dr. Jason Urrutia to Dr. Josse Arriaga on 12/15/2021 at 17:01. RECOMMENDATIONS:   Unavailable             EKG:  This EKG is signed and interpreted by the EP. Afib vs sinus rhythm w/ frequent PACs, vent rate 84bpm, normal axis, no acute injury pattern.  Significant changes have occurred compared w/ prior EKG      ------------------------- NURSING NOTES AND VITALS REVIEWED ---------------------------   The nursing notes within the ED encounter and vital signs as below have been reviewed by myself. BP (!) 187/98   Pulse 72   Temp 98.4 °F (36.9 °C) (Oral)   Resp 19   Ht 5' 4\" (1.626 m)   Wt 129 lb (58.5 kg)   SpO2 99%   BMI 22.14 kg/m²   Oxygen Saturation Interpretation: Normal    The patients available past medical records and past encounters were reviewed. ------------------------------ ED COURSE/MEDICAL DECISION MAKING----------------------  Medications   iopamidol (ISOVUE-370) 76 % injection 150 mL (150 mLs IntraVENous Given 12/15/21 1626)        Last Known Well Time: 1230pm    NIH Stroke Scale at time of initial evaluation:  1A: Level of Consciousness 0 - alert; keenly responsive   1B: Ask Month and Age 0 - answers both questions correctly   1C:  Tell Patient To Open and Close Eyes, then Hand  Squeeze 0 - performs both tasks correctly   2: Test Horizontal Extraocular Movements 0 - normal   3: Test Visual Fields 0 - no visual loss   4: Test Facial Palsy 1 - minor paralysis (flattened nasolabial fold, asymmetric on smiling)   5A: Test Left Arm Motor Drift 0 - no drift, limb holds 90 (or 45) degrees for full 10 seconds   5B: Test Right Arm Motor Drift 0 - no drift, limb holds 90 (or 45) degrees for full 10 seconds   6A: Test Left Leg Motor Drift 0 - no drift; leg holds 30 degree position for full 5 seconds   6B: Test Right Leg Motor Drift 0 - no drift; leg holds 30 degree position for full 5 seconds   7: Test Limb Ataxia   (FNF/Heel-Shin) 0 - absent   8: Test Sensation 1 - mild to moderate sensory loss; patient feels pinprick is less sharp or is dull on the affected side; there is a loss of superficial pain with pinprick but patient is aware of being touched    9: Test Language/Aphasia 0 - no aphasia, normal   10: Test Dysarthria 0 - normal   11: Test Extinction/Inattention 0 - no abnormality   Total 2       Acute CVA Core Measures:      - t-PA Eligibility: IV t-PA was considered and not given due to patient and family decision   - The case has been discussed with [facility], they agree this patient is eligible for TPA and recommend shared decision making with patient and family re: TPA       Consultations:             Neurology    Critical Care: Please note that the withdrawal or failure to initiate urgent interventions for this patient would likely result in a life threatening deterioration or permanent disability. Accordingly this patient received 30 minutes of critical care time, excluding separately billable procedures. Counseling: The emergency provider has spoken with the patient and  discussed todays results, in addition to providing specific details for the plan of care and counseling regarding the diagnosis and prognosis. Questions are answered at this time and they are agreeable with the plan. ED Course/Medical Decision Making: [de-identified] y.o. female here with acute onset dizziness, nausea, vomiting, and ataxia. LKW 1230pm. NIHSS 2 although patient does have ataxic gait as well. Hypertensive on arrival. Initially gave labetalol as patient is potentially a TPA candidate. After long discussion w/ patient and  re: risks vs benefits of TPA, they decided against TPA. They do understand risk of persistent symptoms if TPA not given. Imaging notable for focal moderate to severe left M2 stenosis. Per telestroke recs, gave ASA 324mg and clopidogrel 300mg. They recommend starting ASA 81mg and clopidogrel 75mg. Admitted in stable condition to medicine service under Dr Ruel Leigh for further management.       --------------------------------- IMPRESSION AND DISPOSITION ---------------------------------    IMPRESSION  1. Acute ischemic stroke ([facility] [address]        DISPOSITION  Disposition: Admit to telemetry  Patient condition is stable    NOTE: This report was transcribed using voice recognition software.  Every effort was made to ensure accuracy; however, inadvertent computerized transcription errors may be present    George Gasca MD  Attending Emergency Physician         George Gasca MD  12/15/21 [id number]

## 2021-12-15 NOTE — VIRTUAL HEALTH
[facility],4Th Floor Stroke and Vascular Neurology Consult for  [facility] Stroke Alert through [address] @ 427pm 12/15/2021  12/15/2021 4:36 PM    Pt Name: Giovanna Cruz  MRN: [id number]  [place]is [place]: 1940  Date of evaluation: 12/15/2021  Primary Care Physician: Omega Fowler MD  Reason for Evaluation: Stroke evaluation with Phone Consult, Discussion and Review of imaging    [de-identified] female with pmh of sz (last 2008, on lamictal), arthritis, R hip avascular necrosis. Pt presents with acute n/v vertigo, gait abnl, on exam pt has L arm and leg numbness, L droop. At baseline pt has no focal deficits. lkn was 1230pm 12/15/2021. Pt had sudden onset vertigo, n/v, gait abnl. In the ed pt was found to have L side numbness and L droop as well. sbp 187, glu 119. Allergies  is allergic to penicillins, dextromethorphan, factive [gemifloxacin mesylate], food, mucinex dm [dm-guaifenesin er], other, and shellfish-derived products. Medications  Prior to Admission medications    Medication Sig Start Date End Date Taking? Authorizing Provider   melatonin 3 MG TABS tablet Take 3 mg by mouth nightly as needed    Historical Provider, MD   triamcinolone (KENALOG) 0.1 % cream Apply topically 2 times daily for up to 2 weeks then as needed for flareups. 10/19/21   Omega Fowler MD   lamoTRIgine (LAMICTAL) 100 MG tablet Take 1 tablet by mouth 3 times daily 12/26/19   Koko Eastman PA-C   Acetaminophen (TYLENOL ARTHRITIS PAIN PO) Take by mouth    Historical Provider, MD   primidone (MYSOLINE) 250 MG tablet Take 250 mg by mouth 3 times daily. Historical Provider, MD    Scheduled Meds:   labetalol  5 mg IntraVENous Once     Continuous Infusions:  PRN Meds:.  Past Medical History   has a past medical history of Diverticulosis, Hemorrhoids, internal, Irritable bowel syndrome, Osteoarthritis, Osteoporosis, Seizures ([address] and Urticaria, chronic.   Social History  Social History     Socioeconomic History    Marital status:      Spouse name: Not on file    Number of children: Not on file    Years of education: Not on file    Highest education level: Not on file   Occupational History    Not on file   Tobacco Use    Smoking status: Never Smoker    Smokeless tobacco: Never Used   Substance and Sexual Activity    Alcohol use: No     Comment: rarely    Drug use: No    Sexual activity: Not on file   Other Topics Concern    Not on file   Social History Narrative    Not on file     Social Determinants of Health     Financial Resource Strain:     Difficulty of Paying Living Expenses: Not on file   Food Insecurity: No Food Insecurity    Worried About [address] in the Last Year: Never true    [facility] in the Last Year: Never true   Transportation Needs:     Lack of Transportation (Medical): Not on file    Lack of Transportation (Non-Medical):  Not on file   Physical Activity:     Days of Exercise per Week: Not on file    Minutes of Exercise per Session: Not on file   Stress: No Stress Concern Present    Feeling of Stress : Not at all   Social Connections:     Frequency of Communication with Friends and Family: Not on file    Frequency of Social Gatherings with Friends and Family: Not on file    Attends Gnosticism Services: Not on file    Active Member of [address] [place] [address] or Organizations: Not on file    Attends Club or Organization Meetings: Not on file    Marital Status: Not on file   Intimate Partner Violence:     Fear of Current or Ex-Partner: Not on file    Emotionally Abused: Not on file    Physically Abused: Not on file    Sexually Abused: Not on file   Housing Stability:     Unable to Pay for Housing in the Last Year: Not on file    Number of [place] in the Last Year: Not on file    Unstable Housing in the Last Year: Not on file     Family History      Problem Relation Age of Onset    Coronary Art Dis Mother     Coronary Art Dis Father     Coronary Art Dis Brother     Cancer Brother         lymph nodes and lung    Diabetes Maternal Aunt        OBJECTIVE  Vitals:    12/15/21 1606   BP: (!) 187/98   Pulse: 72   Resp: 19   Temp:    SpO2: 99%        Patient not seen in person. NIHSS calculated based on ED MD report. L UE and LLE numbness, L face droop    NIH Stroke Scale:   1a  Level of consciousness: 0 - alert; keenly responsive   1b. LOC questions:  0 - answers both questions correctly   1c. LOC commands: 0 - performs both tasks correctly   2. Best Gaze: 0 - normal   3. Visual: 0 - no visual loss   4. Facial Palsy: 1 - minor paralysis (flattened nasolabial fold, asymmetric on smiling)   5a. Motor left arm: 0 - no drift, limb holds 90 (or 45) degrees for full 10 seconds   5b. Motor right arm: 0 - no drift, limb holds 90 (or 45) degrees for full 10 seconds   6a. Motor left le - no drift; leg holds 30 degree position for full 5 seconds   6b  Motor right le - no drift; leg holds 30 degree position for full 5 seconds   7. Limb Ataxia: 0 - absent   8. Sensory: 1 - mild to moderate sensory loss; patient feels pinprick is less sharp or is dull on the affected side; there is a loss of superficial pain with pinprick but patient is aware of being touched    9. Best Language:  0 - no aphasia, normal   10. Dysarthria: 0 - normal   11. Extinction and Inattention: 0 - no abnormality         Total:   2     Premorbid MRS: 0      Imaging:  Images were personally reviewed with MOHAN. AI used to review images including:  CT brain without contrast: no large territory hypodensity or bleed  CTA imaging: no LVO, diffuse IC athero (L M1 distal, R a2, L P2 stenosis)  CTP no mismatch    Assessment  [de-identified] female with pmh of sz (last , on lamictal), arthritis, R hip avascular necrosis. Pt presents with acute n/v vertigo, gait abnl, on exam pt has L arm and leg numbness, L droop. Symptoms concerning for acute posterior stroke.  CT/A/P neg for acute changes, but pt does have diffuse IC

## 2021-12-15 NOTE — ED NOTES
47 Pt presented via squad c/o dizziness and new onset HTN. Pt ekg abnormal. Pt denies hx of a-fib. Pt states symptoms started around noon-1300    1620 Stroke alert called by Dr. Gene Groves.     0 Pt off the floor to CT with this RN.    1455  at bedside. Both pt and  declined TPA. 1700 Pt ambulates to the restroom with stand by assist. Pt alert and oriented x 3.    1800 Pt updated on plan of care. 1900 Pt given meal tray. 2000 Pt resting in bed. Pt medicated per MD orders. 2100 Repeat EKG and trop collected and sent.       Zen Gamez, RN  12/15/21 [id number] Missy GALINDO RN  12/15/21 [id number]

## 2021-12-16 ENCOUNTER — APPOINTMENT (OUTPATIENT)
Dept: MRI IMAGING | Age: 81
DRG: 305 | End: 2021-12-16
Payer: MEDICARE

## 2021-12-16 LAB
ANION GAP SERPL CALCULATED.3IONS-SCNC: 12 MMOL/L (ref 7–16)
BASOPHILS ABSOLUTE: 0 E9/L (ref 0–0.2)
BASOPHILS RELATIVE PERCENT: 0 % (ref 0–2)
BUN BLDV-MCNC: 11 MG/DL (ref 6–23)
CALCIUM SERPL-MCNC: 9.1 MG/DL (ref 8.6–10.2)
CHLORIDE BLD-SCNC: 105 MMOL/L (ref 98–107)
CO2: 26 MMOL/L (ref 22–29)
CREAT SERPL-MCNC: 0.7 MG/DL (ref 0.5–1)
EKG ATRIAL RATE: 70 BPM
EKG ATRIAL RATE: 78 BPM
EKG P AXIS: 75 DEGREES
EKG P-R INTERVAL: 228 MS
EKG Q-T INTERVAL: 356 MS
EKG Q-T INTERVAL: 364 MS
EKG QRS DURATION: 88 MS
EKG QRS DURATION: 90 MS
EKG QTC CALCULATION (BAZETT): 393 MS
EKG QTC CALCULATION (BAZETT): 420 MS
EKG R AXIS: 72 DEGREES
EKG R AXIS: 83 DEGREES
EKG T AXIS: 59 DEGREES
EKG T AXIS: 78 DEGREES
EKG VENTRICULAR RATE: 70 BPM
EKG VENTRICULAR RATE: 84 BPM
EOSINOPHILS ABSOLUTE: 0.16 E9/L (ref 0.05–0.5)
EOSINOPHILS RELATIVE PERCENT: 2.9 % (ref 0–6)
GFR AFRICAN AMERICAN: >60
GFR NON-AFRICAN AMERICAN: >60 ML/MIN/1.73
GLUCOSE BLD-MCNC: 91 MG/DL (ref 74–99)
HCT VFR BLD CALC: 42.5 % (ref 34–48)
HEMOGLOBIN: 14 G/DL (ref 11.5–15.5)
IMMATURE GRANULOCYTES #: 0.02 E9/L
IMMATURE GRANULOCYTES %: 0.4 % (ref 0–5)
LV EF: 69 %
LVEF MODALITY: NORMAL
LYMPHOCYTES ABSOLUTE: 1.3 E9/L (ref 1.5–4)
LYMPHOCYTES RELATIVE PERCENT: 23.9 % (ref 20–42)
MAGNESIUM: 2.1 MG/DL (ref 1.6–2.6)
MCH RBC QN AUTO: 29.4 PG (ref 26–35)
MCHC RBC AUTO-ENTMCNC: 32.9 % (ref 32–34.5)
MCV RBC AUTO: 89.3 FL (ref 80–99.9)
MONOCYTES ABSOLUTE: 0.46 E9/L (ref 0.1–0.95)
MONOCYTES RELATIVE PERCENT: 8.5 % (ref 2–12)
NEUTROPHILS ABSOLUTE: 3.49 E9/L (ref 1.8–7.3)
NEUTROPHILS RELATIVE PERCENT: 64.3 % (ref 43–80)
PDW BLD-RTO: 13.2 FL (ref 11.5–15)
PHOSPHORUS: 4 MG/DL (ref 2.5–4.5)
PLATELET # BLD: 192 E9/L (ref 130–450)
PMV BLD AUTO: 10.1 FL (ref 7–12)
POTASSIUM SERPL-SCNC: 3.7 MMOL/L (ref 3.5–5)
RBC # BLD: 4.76 E12/L (ref 3.5–5.5)
SODIUM BLD-SCNC: 143 MMOL/L (ref 132–146)
TROPONIN, HIGH SENSITIVITY: 6 NG/L (ref 0–9)
TSH SERPL DL<=0.05 MIU/L-ACNC: 4.57 UIU/ML (ref 0.27–4.2)
WBC # BLD: 5.4 E9/L (ref 4.5–11.5)

## 2021-12-16 PROCEDURE — 93005 ELECTROCARDIOGRAM TRACING: CPT | Performed by: INTERNAL MEDICINE

## 2021-12-16 PROCEDURE — 93306 TTE W/DOPPLER COMPLETE: CPT

## 2021-12-16 PROCEDURE — 6370000000 HC RX 637 (ALT 250 FOR IP): Performed by: INTERNAL MEDICINE

## 2021-12-16 PROCEDURE — 6360000002 HC RX W HCPCS: Performed by: INTERNAL MEDICINE

## 2021-12-16 PROCEDURE — 70551 MRI BRAIN STEM W/O DYE: CPT

## 2021-12-16 PROCEDURE — 72141 MRI NECK SPINE W/O DYE: CPT

## 2021-12-16 PROCEDURE — 85025 COMPLETE CBC W/AUTO DIFF WBC: CPT

## 2021-12-16 PROCEDURE — 93010 ELECTROCARDIOGRAM REPORT: CPT | Performed by: INTERNAL MEDICINE

## 2021-12-16 PROCEDURE — 83735 ASSAY OF MAGNESIUM: CPT

## 2021-12-16 PROCEDURE — 97161 PT EVAL LOW COMPLEX 20 MIN: CPT | Performed by: PHYSICAL THERAPIST

## 2021-12-16 PROCEDURE — 1200000000 HC SEMI PRIVATE

## 2021-12-16 PROCEDURE — 84484 ASSAY OF TROPONIN QUANT: CPT

## 2021-12-16 PROCEDURE — 80048 BASIC METABOLIC PNL TOTAL CA: CPT

## 2021-12-16 PROCEDURE — 84100 ASSAY OF PHOSPHORUS: CPT

## 2021-12-16 PROCEDURE — 97530 THERAPEUTIC ACTIVITIES: CPT | Performed by: PHYSICAL THERAPIST

## 2021-12-16 PROCEDURE — 36415 COLL VENOUS BLD VENIPUNCTURE: CPT

## 2021-12-16 PROCEDURE — 84443 ASSAY THYROID STIM HORMONE: CPT

## 2021-12-16 RX ORDER — VITAMIN B COMPLEX
1 CAPSULE ORAL DAILY
COMMUNITY

## 2021-12-16 RX ORDER — DIAZEPAM 2 MG/1
2 TABLET ORAL NIGHTLY
Status: DISCONTINUED | OUTPATIENT
Start: 2021-12-16 | End: 2021-12-17

## 2021-12-16 RX ORDER — ACETAMINOPHEN 325 MG/1
650 TABLET ORAL EVERY 6 HOURS PRN
COMMUNITY

## 2021-12-16 RX ORDER — DIAZEPAM 5 MG/1
5 TABLET ORAL NIGHTLY
COMMUNITY
End: 2022-08-19

## 2021-12-16 RX ADMIN — LAMOTRIGINE 100 MG: 100 TABLET ORAL at 09:38

## 2021-12-16 RX ADMIN — ENOXAPARIN SODIUM 40 MG: 100 INJECTION SUBCUTANEOUS at 09:28

## 2021-12-16 RX ADMIN — METOPROLOL TARTRATE 25 MG: 25 TABLET, FILM COATED ORAL at 09:28

## 2021-12-16 RX ADMIN — ASPIRIN 81 MG: 81 TABLET, CHEWABLE ORAL at 09:28

## 2021-12-16 RX ADMIN — PRIMIDONE 250 MG: 250 TABLET ORAL at 21:24

## 2021-12-16 RX ADMIN — LAMOTRIGINE 100 MG: 100 TABLET ORAL at 21:24

## 2021-12-16 RX ADMIN — LAMOTRIGINE 100 MG: 100 TABLET ORAL at 14:23

## 2021-12-16 RX ADMIN — CLOPIDOGREL BISULFATE 75 MG: 75 TABLET ORAL at 09:28

## 2021-12-16 RX ADMIN — Medication 3 MG: at 23:40

## 2021-12-16 RX ADMIN — DIAZEPAM 2 MG: 2 TABLET ORAL at 21:24

## 2021-12-16 RX ADMIN — PRIMIDONE 250 MG: 250 TABLET ORAL at 09:38

## 2021-12-16 RX ADMIN — METOPROLOL TARTRATE 25 MG: 25 TABLET, FILM COATED ORAL at 21:24

## 2021-12-16 RX ADMIN — ATORVASTATIN CALCIUM 40 MG: 40 TABLET, FILM COATED ORAL at 21:24

## 2021-12-16 RX ADMIN — PRIMIDONE 250 MG: 250 TABLET ORAL at 14:23

## 2021-12-16 ASSESSMENT — PAIN DESCRIPTION - DESCRIPTORS
DESCRIPTORS: SHARP
DESCRIPTORS: TINGLING

## 2021-12-16 ASSESSMENT — PAIN DESCRIPTION - PAIN TYPE
TYPE: ACUTE PAIN

## 2021-12-16 ASSESSMENT — PAIN SCALES - GENERAL
PAINLEVEL_OUTOF10: 5

## 2021-12-16 ASSESSMENT — PAIN DESCRIPTION - PROGRESSION: CLINICAL_PROGRESSION: NOT CHANGED

## 2021-12-16 ASSESSMENT — PAIN DESCRIPTION - ONSET: ONSET: ON-GOING

## 2021-12-16 ASSESSMENT — PAIN DESCRIPTION - LOCATION
LOCATION: HEAD
LOCATION: HEAD
LOCATION: ARM

## 2021-12-16 ASSESSMENT — PAIN DESCRIPTION - ORIENTATION
ORIENTATION: RIGHT;ANTERIOR
ORIENTATION: RIGHT;ANTERIOR
ORIENTATION: LEFT

## 2021-12-16 ASSESSMENT — PAIN DESCRIPTION - FREQUENCY: FREQUENCY: INTERMITTENT

## 2021-12-16 NOTE — H&P
Department of Internal Medicine  History and Physical    Primary Care Physician: Aylin Cheng MD   Admitting Physician:  No admitting provider for patient encounter. Admission date and time: 12/15/2021  3:49 PM    Room:  10/10  Admitting diagnosis: Acute ischemic stroke [facility]) [I63.9]    Patient Name: Ngozi Spaulding  MRN: [id number]    Date of Service: 12/16/2021       CHIEF COMPLAINT:  Dizziness and tingling in the arms bilaterally. HISTORY OF PRESENT ILLNESS:    The patient reports that yesterday at lunchtime while she was cooking she had onset of lightheadedness. States she felt like she could fall but the room was not spinning. I difficulty maintaining her balance. Patient admitted to nausea and vomiting for that time. Patient also states she has been having a lot of tingling in the upper extremities bilaterally. She states this has been going on for approximately 6 months. States she has not discussed with her primary care physician because she was scared of what the cause may be. She states at the present time she feels relatively baseline but she has not been up out of the bed yet. While being evaluated in the emergency department she was found to have left facial droop. Strength was intact. Patient was in need of further evaluation and treatment and admitted to the services of Dr. Sherry Frias and Dr. Awilda Porter. PAST MEDICAL Hx:  Past Medical History:   Diagnosis Date    Diverticulosis     Hemorrhoids, internal     Irritable bowel syndrome     Osteoarthritis     rt knee    Osteoporosis     Seizures ([facility] [address]     complex partial  last seizure 2008?     Urticaria, chronic        PAST SURGICAL Hx:   Past Surgical History:   Procedure Laterality Date    APPENDECTOMY      CARPAL TUNNEL RELEASE Left     CHOLECYSTECTOMY      COLONOSCOPY      FINGER TRIGGER RELEASE Left     HC INJECT OTHER PERPHRL NERV Right 10/8/2019    RIGHT HIP INJECTION (CPT 71376) performed by [facility]ail English MD [facility]    HC INJECTION PROCEDURE FOR SACROILIAC JOINT Right 9/19/2019    RIGHT SACROILIAC JOINT INJECTION performed by Jazmyne Amaro MD at [address] Right 10/31/2014    Laparoscopic right inguinal hernia repair with mesh     HYSTERECTOMY      vaginal    KNEE ARTHROSCOPY      right    NERVE BLOCK Right 09/19/2019    sacroiliac joint    NERVE BLOCK Right 10/08/2019    hip injection    TONSILLECTOMY AND ADENOIDECTOMY      TOTAL HIP ARTHROPLASTY Right 01/2020       FAMILY Hx:  Family History   Problem Relation Age of Onset    Coronary Art Dis Mother     Coronary Art Dis Father     Coronary Art Dis Brother     Cancer Brother         lymph nodes and lung    Diabetes Maternal Aunt        HOME MEDICATIONS:  Prior to Admission medications    Medication Sig Start Date End Date Taking? Authorizing Provider   melatonin 3 MG TABS tablet Take 3 mg by mouth nightly as needed    Historical Provider, MD   triamcinolone (KENALOG) 0.1 % cream Apply topically 2 times daily for up to 2 weeks then as needed for flareups. 10/19/21   Susie Nath MD   lamoTRIgine (LAMICTAL) 100 MG tablet Take 1 tablet by mouth 3 times daily 12/26/19   Brannon Grewal PA-C   Acetaminophen (TYLENOL ARTHRITIS PAIN PO) Take by mouth    Historical Provider, MD   primidone (MYSOLINE) 250 MG tablet Take 250 mg by mouth 3 times daily. Historical Provider, MD       ALLERGIES:  Penicillins, Dextromethorphan, Factive [gemifloxacin mesylate], Food, Mucinex dm [dm-guaifenesin er], Other, and Shellfish-derived products    SOCIAL Hx:  Social History     Socioeconomic History    Marital status:      Spouse name: Not on file    Number of children: Not on file    Years of education: Not on file    Highest education level: Not on file   Occupational History    Not on file   Tobacco Use    Smoking status: Never Smoker    Smokeless tobacco: Never Used   Substance and Sexual Activity    Alcohol use:  No Comment: rarely    Drug use: No    Sexual activity: Not on file   Other Topics Concern    Not on file   Social History Narrative    States her son help her and her . Social Determinants of Health     Financial Resource Strain:     Difficulty of Paying Living Expenses: Not on file   Food Insecurity: No Food Insecurity    Worried About Running Out of Food in the Last Year: Never true    [place]abb[place] of Food in the Last Year: Never true   Transportation Needs:     Lack of Transportation (Medical): Not on file    Lack of Transportation (Non-Medical): Not on file   Physical Activity:     Days of Exercise per Week: Not on file    Minutes of Exercise per Session: Not on file   Stress: No Stress Concern Present    Feeling of Stress : Not at all   Social Connections:     Frequency of Communication with Friends and Family: Not on file    Frequency of Social Gatherings with Friends and Family: Not on file    Attends Quaker Services: Not on file    Active Member of Clubs or Organizations: Not on file    Attends Club or Organization Meetings: Not on file    Marital Status: Not on file   Intimate Partner Violence:     Fear of Current or Ex-Partner: Not on file    Emotionally Abused: Not on file    Physically Abused: Not on file    Sexually Abused: Not on file   Housing Stability:     Unable to Pay for Housing in the Last Year: Not on file    Number of [place] in the Last Year: Not on file    Unstable Housing in the Last Year: Not on file       ROS:  General:   Denies chills, fatigue, fever, malaise, night sweats or weight loss    Psychological:   Denies anxiety, disorientation or hallucinations    ENT:    Denies epistaxis, headaches, vertigo or visual changes    Cardiovascular:   Denies any chest pain, irregular heartbeats, or palpitations. No paroxysmal nocturnal dyspnea. Respiratory:   Denies shortness of breath, coughing, sputum production, hemoptysis, or wheezing.   No orthopnea. Gastrointestinal:   Positive for nausea, vomiting, denies diarrhea, or constipation. Denies any abdominal pain. Denies change in bowel habits or stools. Genito-Urinary:    Denies any urgency, frequency, hematuria. Voiding without difficulty. Musculoskeletal:   Denies joint pain, joint stiffness, joint swelling or muscle pain    Neurology:    Denies any headache or focal neurological deficits. Patient admitts to dizziness. Also has complaint of tingling in the bilateral upper extremities. Has been present for approximately 6 months. Derm:    Denies any rashes, ulcers, or excoriations. Denies bruising. Extremities:   Denies any lower extremity swelling or edema. PHYSICAL EXAM:  VITALS:  Blood pressure (!) 177/78, pulse 65, temperature 98.4 °F (36.9 °C), temperature source Oral, resp. rate 18, height 5' 4\" (1.626 m), weight 129 lb (58.5 kg), SpO2 97 %, not currently breastfeeding. CONSTITUTIONAL:    Awake, alert, cooperative, no apparent distress, and appears stated age    EYES:    PERRL, EOMI, sclera clear, conjunctiva normal    ENT:    Normocephalic, atraumatic, sinuses nontender on palpation. External ears without lesions. Oral pharynx with moist mucus membranes. Slight left facial droop. NECK:    Supple, symmetrical, trachea midline, no adenopathy, thyroid symmetric, not enlarged and no tenderness, skin normal, no bruits, no JVD. HEMATOLOGIC/LYMPHATICS:    No cervical lymphadenopathy and no supraclavicular lymphadenopathy    LUNGS:    Symmetric.  No increased work of breathing, good air exchange, clear to auscultation bilaterally, no wheezes, rhonchi, or rales,     CARDIOVASCULAR:    Normal apical impulse, regular rate and rhythm, normal S1 and S2, no S3 or S4, and no murmur noted    ABDOMEN:    No scars, normal bowel sounds, soft, non-distended, non-tender, no masses palpated, no hepatosplenomegaly, no rebound or guarding elicited on palpation     MUSCULOSKELETAL: There is no redness, warmth, or swelling of the joints. Full range of motion noted. Motor strength is 5 out of 5 all extremities bilaterally. Tone is normal.    NEUROLOGIC:    Awake, alert, oriented to name, place and time. Cranial nerves II-XII are grossly intact. Motor is 5 out of 5 bilaterally. SKIN:    No bruising or bleeding. No redness, warmth, or swelling    EXTREMITIES:    Peripheral pulses present. No edema, cyanosis, or swelling. OSTEOPATHIC:    Examined in seated and supine positions. Normal thoracic kyphosis and lumbar lordosis. No acute somatic dysfunction. LABORATORY DATA:  CBC with Differential:    Lab Results   Component Value Date    WBC 5.4 12/16/2021    RBC 4.76 12/16/2021    HGB 14.0 12/16/2021    HCT 42.5 12/16/2021     12/16/2021    MCV 89.3 12/16/2021    MCH 29.4 12/16/2021    MCHC 32.9 12/16/2021    RDW 13.2 12/16/2021    SEGSPCT 54 05/14/2011    LYMPHOPCT 23.9 12/16/2021    MONOPCT 8.5 12/16/2021    BASOPCT 0.0 12/16/2021    MONOSABS 0.46 12/16/2021    LYMPHSABS 1.30 12/16/2021    EOSABS 0.16 12/16/2021    BASOSABS 0.00 12/16/2021     CMP:    Lab Results   Component Value Date     12/16/2021    K 3.7 12/16/2021     12/16/2021    CO2 26 12/16/2021    BUN 11 12/16/2021    CREATININE 0.7 12/16/2021    GFRAA >60 12/16/2021    LABGLOM >60 12/16/2021    GLUCOSE 91 12/16/2021    GLUCOSE 80 05/14/2011    PROT 7.1 12/15/2021    LABALBU 4.5 12/15/2021    LABALBU 4.4 05/14/2011    CALCIUM 9.1 12/16/2021    BILITOT 0.2 12/15/2021    ALKPHOS 104 12/15/2021    AST 24 12/15/2021    ALT 16 12/15/2021       ASSESSMENT:    · Strokelike symptoms  · Chronic pain syndrome  · Primary insomnia  · Tingling bilateral upper extremities      PLAN:  Patient was seen and evaluated in the emergency department. She had just had her echocardiogram performed. Results are pending.   Patient states she feels relatively back to baseline with exception of she does continue to have tingling in the bilateral upper extremities. Neurology has been consulted and recommendations have been reviewed. They suggest antiplatelet monotherapy. Patient is currently on aspirin and Plavix. Allergy also suggested reevaluation of antiepileptic medications as he felt polypharmacy regimen may be concerning to the patient's current symptoms. PT/OT evaluate and treat the patient. Diet as tolerated. Activity as tolerated. Patient's home medications were reviewed/adjusted/continued. Please see orders for further plan of care. Labs as ordered. Due to extremely high hospital inpatient census and bed limitations, along with staff shortages, we have had a ward discussion with the patient/family regarding the likelihood of prolonged ER boarding status and potential delays/disruptions in care related to this. They understand and agree to maintain hospitalization at this facility while accepting these risks. We have made every attempt to coordinate care with the ER nursing staff as well to avoid any disruptions in care. The patient was seen, examined and then discussed with Dr. Obdulio Mendez. EDUARDA Dodd CNP, NP-C  1:37 PM  12/16/2021    Electronically signed by EDUARDA Dodd CNP on 12/16/21 at 1:33 PM EST    I have personally participated in the  medical decision making with the nurse practitioner on the date of service and I agree with all the pertinent clinical information unless otherwise noted. I have also reviewed and agree with the past medical, family, and social history unless otherwise noted.       Khadijah Gallegos DO, D.O., FACOI  2:29 PM  12/16/2021

## 2021-12-16 NOTE — CONSULTS
History Of Present Illness:   Anny Hines is a [de-identified] y.o. female presenting to the ED for dizziness. Acute onset around 1230 or 100 pm while cooking at home, persistent since onset, moderate in severity, no aggravating/alleviating factors. Feels lightheaded, no room spinning but feels like she is falling and has been unable to keep her balance while ambulating since time of onset. Associated nausea and vomiting. No recent fall or injury. No known hx of CVA. Not on anticoagulants.     Chart review: hx of epilepsy. As above per ed staff. Patient interviewed and corroborates above; she does not use cane at baseline and gait ataxia has persisted. The patient is a [de-identified] y.o. female with significant past medical history of seizure disorder who presents with above. The patient has the following symptoms:    Change in level of consciousness: alert    New Weakness: no    Numbness or Tingling: no    Difficulty Swallowing: no    Current Medications:   Scheduled Meds:   lamoTRIgine  100 mg Oral TID    primidone  250 mg Oral TID    atorvastatin  40 mg Oral Nightly    aspirin  81 mg Oral Daily    clopidogrel  75 mg Oral Daily    enoxaparin  40 mg SubCUTAneous Daily    metoprolol tartrate  25 mg Oral BID     Continuous Infusions:  PRN Meds:melatonin, ondansetron    Allergies:  Penicillins, Dextromethorphan, Factive [gemifloxacin mesylate], Food, Mucinex dm [dm-guaifenesin er], Other, and Shellfish-derived products    Social History:   TOBACCO:   reports that she has never smoked. She has never used smokeless tobacco.  ETOH:   reports no history of alcohol use. Past Medical History:        Diagnosis Date    Diverticulosis     Hemorrhoids, internal     Irritable bowel syndrome     Osteoarthritis     rt knee    Osteoporosis     Seizures ([address]     complex partial  last seizure 2008?     Urticaria, chronic        Past Surgical History:        Procedure Laterality Date    APPENDECTOMY      CARPAL TUNNEL RELEASE Left     CHOLECYSTECTOMY      COLONOSCOPY      FINGER TRIGGER RELEASE Left     HC INJECT OTHER PERPHRL NERV Right 10/8/2019    RIGHT HIP INJECTION (CPT 53989) performed by Renaldo Diaz MD at [facility] Right 9/19/2019    RIGHT SACROILIAC JOINT INJECTION performed by Renaldo Diaz MD at [address] [place],[address] Right 10/31/2014    Laparoscopic right inguinal hernia repair with mesh     HYSTERECTOMY      vaginal    KNEE ARTHROSCOPY      right    NERVE BLOCK Right 09/19/2019    sacroiliac joint    NERVE BLOCK Right 10/08/2019    hip injection    TONSILLECTOMY AND ADENOIDECTOMY      TOTAL HIP ARTHROPLASTY Right 01/2020         Outside reports reviewed: ER records, historical medical records, lab reports and radiology reports. Patient's medications, allergies, past medical, surgical, social and family histories were reviewed and updated as appropriate. Review of Systems  A comprehensive review of systems was negative except for:       Objective:     Neuro exam 164/82 p 74 t 98  General: normal orientation and alertness. Cranial nerve testing was normal.  Funduscopic eye exam revealed not testable. Motor exam: 5-/5. Deep tendon reflexes were 1+ bilaterally. Plantar responses were flexor bilaterally. Cerebellar exam noted finger to nose without dysmetria. Sensation was normal to joint position sense, light touch and a pin prick . Cherelle Raddle Assessment:   Clinically patient appears to have suffered small brainstem infarct  CTP/CTA head and neck negative  MRI brain negative      Plan:   Suggest antiplatelet monotherapy--presently receiving dapt with increased risk of bleeding.   Suggest reevaluation of anti epileptic drug(probably contributing to polypharmacy) regime as outpatient--consider wean to possible withdrawal of primidone/lamictal (furthermore lamictal is bid drug)  Statin  PT--recent right hip and bilateral knee replacement surgeries noted.   Thanks for consult

## 2021-12-17 LAB
ANION GAP SERPL CALCULATED.3IONS-SCNC: 11 MMOL/L (ref 7–16)
BASOPHILS ABSOLUTE: 0.01 E9/L (ref 0–0.2)
BASOPHILS RELATIVE PERCENT: 0.2 % (ref 0–2)
BUN BLDV-MCNC: 10 MG/DL (ref 6–23)
CALCIUM SERPL-MCNC: 9.1 MG/DL (ref 8.6–10.2)
CHLORIDE BLD-SCNC: 97 MMOL/L (ref 98–107)
CK MB: 1.7 NG/ML (ref 0–4.3)
CO2: 27 MMOL/L (ref 22–29)
CREAT SERPL-MCNC: 0.7 MG/DL (ref 0.5–1)
EOSINOPHILS ABSOLUTE: 0.03 E9/L (ref 0.05–0.5)
EOSINOPHILS RELATIVE PERCENT: 0.5 % (ref 0–6)
FOLATE: 6.5 NG/ML (ref 4.8–24.2)
GFR AFRICAN AMERICAN: >60
GFR NON-AFRICAN AMERICAN: >60 ML/MIN/1.73
GLUCOSE BLD-MCNC: 96 MG/DL (ref 74–99)
HCT VFR BLD CALC: 42.1 % (ref 34–48)
HEMOGLOBIN: 13.8 G/DL (ref 11.5–15.5)
IMMATURE GRANULOCYTES #: 0.03 E9/L
IMMATURE GRANULOCYTES %: 0.5 % (ref 0–5)
LYMPHOCYTES ABSOLUTE: 1.39 E9/L (ref 1.5–4)
LYMPHOCYTES RELATIVE PERCENT: 21.3 % (ref 20–42)
MCH RBC QN AUTO: 29.2 PG (ref 26–35)
MCHC RBC AUTO-ENTMCNC: 32.8 % (ref 32–34.5)
MCV RBC AUTO: 89 FL (ref 80–99.9)
MONOCYTES ABSOLUTE: 0.37 E9/L (ref 0.1–0.95)
MONOCYTES RELATIVE PERCENT: 5.7 % (ref 2–12)
NEUTROPHILS ABSOLUTE: 4.71 E9/L (ref 1.8–7.3)
NEUTROPHILS RELATIVE PERCENT: 71.8 % (ref 43–80)
PDW BLD-RTO: 12.9 FL (ref 11.5–15)
PLATELET # BLD: 181 E9/L (ref 130–450)
PMV BLD AUTO: 10 FL (ref 7–12)
POTASSIUM SERPL-SCNC: 3.8 MMOL/L (ref 3.5–5)
RBC # BLD: 4.73 E12/L (ref 3.5–5.5)
SODIUM BLD-SCNC: 135 MMOL/L (ref 132–146)
TROPONIN, HIGH SENSITIVITY: 7 NG/L (ref 0–9)
VITAMIN B-12: 247 PG/ML (ref 211–946)
WBC # BLD: 6.5 E9/L (ref 4.5–11.5)

## 2021-12-17 PROCEDURE — 97116 GAIT TRAINING THERAPY: CPT

## 2021-12-17 PROCEDURE — 82553 CREATINE MB FRACTION: CPT

## 2021-12-17 PROCEDURE — 82746 ASSAY OF FOLIC ACID SERUM: CPT

## 2021-12-17 PROCEDURE — 80048 BASIC METABOLIC PNL TOTAL CA: CPT

## 2021-12-17 PROCEDURE — 84484 ASSAY OF TROPONIN QUANT: CPT

## 2021-12-17 PROCEDURE — 85025 COMPLETE CBC W/AUTO DIFF WBC: CPT

## 2021-12-17 PROCEDURE — 6370000000 HC RX 637 (ALT 250 FOR IP): Performed by: INTERNAL MEDICINE

## 2021-12-17 PROCEDURE — 1200000000 HC SEMI PRIVATE

## 2021-12-17 PROCEDURE — 36415 COLL VENOUS BLD VENIPUNCTURE: CPT

## 2021-12-17 PROCEDURE — 82607 VITAMIN B-12: CPT

## 2021-12-17 PROCEDURE — 6360000002 HC RX W HCPCS: Performed by: INTERNAL MEDICINE

## 2021-12-17 PROCEDURE — 97165 OT EVAL LOW COMPLEX 30 MIN: CPT

## 2021-12-17 RX ORDER — BUPIVACAINE HYDROCHLORIDE 7.5 MG/ML
2.4 INJECTION, SOLUTION INTRASPINAL ONCE
Status: DISCONTINUED | OUTPATIENT
Start: 2021-12-17 | End: 2021-12-17

## 2021-12-17 RX ORDER — DIAZEPAM 5 MG/1
2.5 TABLET ORAL NIGHTLY
Status: DISCONTINUED | OUTPATIENT
Start: 2021-12-17 | End: 2021-12-18 | Stop reason: HOSPADM

## 2021-12-17 RX ADMIN — LAMOTRIGINE 100 MG: 100 TABLET ORAL at 14:04

## 2021-12-17 RX ADMIN — CLOPIDOGREL BISULFATE 75 MG: 75 TABLET ORAL at 08:43

## 2021-12-17 RX ADMIN — ASPIRIN 81 MG: 81 TABLET, CHEWABLE ORAL at 08:43

## 2021-12-17 RX ADMIN — PRIMIDONE 250 MG: 250 TABLET ORAL at 20:40

## 2021-12-17 RX ADMIN — PRIMIDONE 250 MG: 250 TABLET ORAL at 14:04

## 2021-12-17 RX ADMIN — ATORVASTATIN CALCIUM 40 MG: 40 TABLET, FILM COATED ORAL at 20:39

## 2021-12-17 RX ADMIN — METOPROLOL TARTRATE 25 MG: 25 TABLET, FILM COATED ORAL at 20:39

## 2021-12-17 RX ADMIN — DIAZEPAM 2.5 MG: 5 TABLET ORAL at 20:40

## 2021-12-17 RX ADMIN — LAMOTRIGINE 100 MG: 100 TABLET ORAL at 20:40

## 2021-12-17 RX ADMIN — LAMOTRIGINE 100 MG: 100 TABLET ORAL at 08:43

## 2021-12-17 RX ADMIN — METOPROLOL TARTRATE 25 MG: 25 TABLET, FILM COATED ORAL at 08:43

## 2021-12-17 RX ADMIN — Medication 3 MG: at 20:40

## 2021-12-17 RX ADMIN — ENOXAPARIN SODIUM 40 MG: 100 INJECTION SUBCUTANEOUS at 08:43

## 2021-12-17 RX ADMIN — PRIMIDONE 250 MG: 250 TABLET ORAL at 08:43

## 2021-12-17 ASSESSMENT — PAIN SCALES - GENERAL: PAINLEVEL_OUTOF10: 0

## 2021-12-17 NOTE — PROGRESS NOTES
Internal Medicine Progress Note    [facility]    Devonte Maciel. Lisa Rutledge., F.A.CAnastasiaOAnastasiaI. Daksha Jackson D.O., ELAINEODC Ramirez, MSN, APRN, NP-C  Matteo Titus. Renetta Sprague, MSN, APRN-CNP     Primary Care Physician: Khadijah Torrez MD   Admitting Physician:  Isabela Reyes DO  Admission date and time: 12/15/2021  3:49 PM    Room:  [address] [place] [address]  Admitting diagnosis: Acute ischemic stroke [facility]) [I63.9]    Patient Name: Alejandrina Hooper  MRN: [id number]    Date of Service: 12/17/2021     Subjective:  Pedro Hill is a [de-identified] y.o. female who was seen and examined today,12/17/2021, at the bedside. Patient has been seen by neurology and recommendation appreciated. 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 The patient is having a multitude of symptoms today consisting of facial flushing fullness in her neck twitching and pain in her arm which appear to be more anxiety related but is uncertain. Blood pressure slightly elevated. Despite these complaints he is sitting in chair resting comfortably     was present during my examination. Review of System:   Constitutional:   Denies fever or chills, weight loss or gain, fatigue or malaise. HEENT:   Denies ear pain, sore throat, sinus or eye problems. Complains of facial flushing  Cardiovascular:   Denies any chest pain, irregular heartbeats, or palpitations. Respiratory:   Denies shortness of breath, coughing, sputum production, hemoptysis, or wheezing. Gastrointestinal:   Denies nausea, vomiting, diarrhea, or constipation. Denies any abdominal pain. Genitourinary:    Denies any urgency, frequency, hematuria. Voiding  without difficulty. Extremities:   Denies lower extremity swelling, edema or cyanosis. Neurology:    Denies any headache or focal neurological deficits, Denies generalized weakness or memory difficulty.   Pain in left arm  Psch:   Denies being anxious or depressed. Musculoskeletal:    Denies  myalgias, joint complaints or back pain. Integumentary:   Denies any rashes, ulcers, or excoriations. Denies bruising. Hematologic/Lymphatic:  Denies bruising or bleeding. Physical Exam:  I/O this shift:  In: 240 [P.O.:240]  Out: -     Intake/Output Summary (Last 24 hours) at 12/17/2021 1332  Last data filed at 12/17/2021 1004  Gross per 24 hour   Intake 240 ml   Output --   Net 240 ml   No intake/output data recorded. Patient Vitals for the past 96 hrs (Last 3 readings):   Weight   12/16/21 2205 132 lb (59.9 kg)   12/15/21 1554 129 lb (58.5 kg)     Vital Signs:   Blood pressure (!) 142/52, pulse 68, temperature 98.1 °F (36.7 °C), temperature source Oral, resp. rate 18, height 5' 4\" (1.626 m), weight 132 lb (59.9 kg), SpO2 99 %, not currently breastfeeding. General appearance:  Alert, responsive, oriented to person, place, and time. Well preserved, alert, no distress. Seems anxious  Head:  Normocephalic. No masses, lesions or tenderness. Eyes:  PERRLA. EOMI. Sclera clear. Buccal mucosa moist.  ENT:  Ears normal. Mucosa normal.  Neck:    Supple. Trachea midline. No thyromegaly. No JVD. No bruits. Heart:    Rhythm regular. Rate controlled. 1/6 murmurs. Lungs:    Symmetrical. Clear to auscultation bilaterally. No wheezes. No rhonchi. No rales. Abdomen:   Soft. Non-tender. Non-distended. Bowel sounds positive. No organomegaly or masses. No pain on palpation. Extremities:    Peripheral pulses present. No peripheral edema. No ulcers. No cyanosis. No clubbing. Neurologic:    Alert x 3. No focal deficit. Cranial nerves grossly intact. No focal weakness. Psych:   Behavior is normal. Mood appears normal. Speech is not rapid and/or pressured. Musculoskeletal:   Spine ROM normal. Muscular strength intact. Gait not assessed.   Integumentary:  No rashes  Skin normal color and texture. Genitalia/Breast:  Deferred    Medication:  Scheduled Meds:   diazePAM  2 mg Oral Nightly    lamoTRIgine  100 mg Oral TID    primidone  250 mg Oral TID    atorvastatin  40 mg Oral Nightly    aspirin  81 mg Oral Daily    clopidogrel  75 mg Oral Daily    metoprolol tartrate  25 mg Oral BID     Continuous Infusions:    Objective Data:  CBC with Differential:    Lab Results   Component Value Date    WBC 6.5 12/17/2021    RBC 4.73 12/17/2021    HGB 13.8 12/17/2021    HCT 42.1 12/17/2021     12/17/2021    MCV 89.0 12/17/2021    MCH 29.2 12/17/2021    MCHC 32.8 12/17/2021    RDW 12.9 12/17/2021    SEGSPCT 54 05/14/2011    LYMPHOPCT 21.3 12/17/2021    MONOPCT 5.7 12/17/2021    BASOPCT 0.2 12/17/2021    MONOSABS 0.37 12/17/2021    LYMPHSABS 1.39 12/17/2021    EOSABS 0.03 12/17/2021    BASOSABS 0.01 12/17/2021     CMP:    Lab Results   Component Value Date     12/17/2021    K 3.8 12/17/2021    CL 97 12/17/2021    CO2 27 12/17/2021    BUN 10 12/17/2021    CREATININE 0.7 12/17/2021    GFRAA >60 12/17/2021    LABGLOM >60 12/17/2021    GLUCOSE 96 12/17/2021    GLUCOSE 80 05/14/2011    PROT 7.1 12/15/2021    LABALBU 4.5 12/15/2021    LABALBU 4.4 05/14/2011    CALCIUM 9.1 12/17/2021    BILITOT 0.2 12/15/2021    ALKPHOS 104 12/15/2021    AST 24 12/15/2021    ALT 16 12/15/2021              Assessment:    · Strokelike symptoms--resolved  · New onset hypertension  · Chronic pain syndrome  · Primary insomnia  · Tingling bilateral upper extremities  · Questionable history of seizure disorder  · Possible somatic conversions        Plan:     · Patient relates to having multiple complaints. She thinks it might be related to the Lovenox.   This will be discontinue  · We will continue to observe today  · Monitor blood pressure  · Continue antiplatelet agents  · Recommend follow-up with her neurologist to review medication  · Might benefit from outpatient EMG      More than 50% of my  time was spent at the bedside counseling/coordinating care with the patient and/or family with face to face contact. This time was spent reviewing notes and laboratory data as well as instructing and counseling the patient. Time I spent with the family or surrogate(s) is included only if the patient was incapable of providing the necessary information or participating in medical decisions. I also discussed the differential diagnosis and all of the proposed management plans with the patient and individuals accompanying the patient. Maryjane Hooper requires this high level of physician care and nursing on the Telemetry unit due the complexity of decision management and chance of rapid decline or death. Continued cardiac monitoring and higher level of nursing are required. I am readily available for any further decision-making and intervention.      Winnie Morales DO, F.A.C.O.I.  12/17/2021  1:32 PM

## 2021-12-17 NOTE — PLAN OF CARE
Problem: Pain:  Goal: Control of acute pain  Description: Control of acute pain  12/17/2021 1702 by Nidia Talamantes RN  Outcome: Met This Shift     Problem: Falls - Risk of:  Goal: Will remain free from falls  Description: Will remain free from falls  Outcome: Met This Shift

## 2021-12-17 NOTE — CARE COORDINATION
Ss note:12/17/2021.2:01 PM No covid testing. Met with pt, states obtained the Moderna vaccine. Pt is very pleasant, resides with spouse in ranch home, 3 steps to enter. PTA pt is independent, has access to cane, ww, shower chair that she doesn't need to use, does not drive, spouse does. Pts son Jessica Singh is an occupational therapist with [address] [employer] [facility]. Relays a hx of [facility] some 5 month ago, denies need for [facility] at discharge. No hx of SNF. PCP is [facility], obtains medications at [address]. Family will transport home, no transition of care needs relayed.  HAILEY Finch

## 2021-12-17 NOTE — PROGRESS NOTES
Physical Therapy Treatment Note/Plan of Care    Room #:  8040/7694-25  Patient Name: Bang Galvez  YOB: 1940  MRN: [id number]    Date of Service: 12/17/2021     Tentative placement recommendation: Home Health Physical Therapy   Equipment recommendation: None      Evaluating Physical Therapist: Gabriella Goodell, PT, DPT #[id number]      Specific Provider Orders/Date/Referring Provider :     12/15/21 2015   PT eval and treat Start: 12/15/21 2015, End: 12/15/21 2015, ONE TIME, Standing Count: 1 Occurrences, R    Gustavo Benito,  Acknowledge New      Admitting Diagnosis:   Acute ischemic stroke [facility]) [I63.9]      Surgery: none      Patient Active Problem List   Diagnosis    Primary osteoarthritis of both knees    Spine pain, cervical    Left ankle pain    Generalized anxiety disorder    Cervicalgia    Primary osteoarthritis of right shoulder    Primary insomnia    Disorder of sacrum    Chronic pain syndrome    Primary osteoarthritis of right hip    Spinal stenosis of lumbar region without neurogenic claudication    Lumbar facet arthropathy    Lumbar disc disorder    Lumbar radiculopathy    Avascular necrosis of bone of right hip ([address]    Partial symptomatic epilepsy with complex partial seizures, not intractable, without status epilepticus ([address]    Acute ischemic stroke ([address]    Acute CVA (cerebrovascular accident) ([address]        ASSESSMENT of Current Deficits Patient exhibits decreased strength, balance, endurance and coordination impairing functional mobility, transfers, gait , gait distance and tolerance to activity. Strength bilateral was slightly diminished with the same strength on both sides. Pt with improved balance today. Decreased hemant. The patient will benefit from continued skilled therapy to increase strength and improve balance for safe functional mobility, to decrease risk of falls, and to meet goals at discharge.          PHYSICAL THERAPY  PLAN OF CARE       Physical therapy plan of care is established based on physician order,  patient diagnosis and clinical assessment    Current Treatment Recommendations:    -Bed Mobility: Lower extremity exercises  and Trunk control activities   -Sitting Balance: Incorporate reaching activities to activate trunk muscles , Hands on support to maintain midline , Facilitate active trunk muscle engagement , Facilitate postural control in all planes  and Engage in core activities to allow for movement within base of support   -Standing Balance: Perform strengthening exercises in standing to promote motor control with or without upper extremity support , Instruct patient on adequate base of support to maintain balance and Challenge balance utilizing reaching  activities beyond center of gravity    -Transfers: Provide instruction on proper hand and foot position for adequate transfer of weight onto lower extremities and use of gait device if needed, Cues for hand placement, technique and safety. Provide stabilization to prevent fall , Facilitate weight shift forward on to lower extremities and provide necessary stabilization of bilateral lower extremities , Support transfer of weight on to lower extremities and Assist with extension of knees trunk and hip to accept weight transfer   -Gait: Gait training, Standing activities to improve: base of support, weight shift, weight bearing , Exercises to improve trunk control, Exercises to improve hip and knee control, Performance of protected weight bearing activities and Activities to increase weight bearing   -Endurance: Utilize Supervised activities to increase level of endurance to allow for safe functional mobility including transfers and gait   -Stairs: Stair training with instruction on proper technique and hand placement on rail    PT long term treatment goals are located in below grid    Patient and or family understand(s) diagnosis, prognosis, and plan of care.     Frequency of treatments: Patient will be seen daily.         Prior Level of Function: Patient ambulated independently   Rehab Potential: good - for baseline    Past medical history:   Past Medical History:   Diagnosis Date    Diverticulosis     Hemorrhoids, internal     Irritable bowel syndrome     Osteoarthritis     rt knee    Osteoporosis     Seizures ([address]     complex partial  last seizure 2008?  Urticaria, chronic      Past Surgical History:   Procedure Laterality Date    APPENDECTOMY      CARPAL TUNNEL RELEASE Left     CHOLECYSTECTOMY      COLONOSCOPY      FINGER TRIGGER RELEASE Left     HC INJECT OTHER PERPHRL NERV Right 10/8/2019    RIGHT HIP INJECTION (CPT 00580) performed by Claudia Ham MD at [facility] Right 9/19/2019    RIGHT SACROILIAC JOINT INJECTION performed by Claudia Ham MD at [address] [facility] Right 10/31/2014    Laparoscopic right inguinal hernia repair with mesh     HYSTERECTOMY      vaginal    KNEE ARTHROSCOPY      right    NERVE BLOCK Right 09/19/2019    sacroiliac joint    NERVE BLOCK Right 10/08/2019    hip injection    TONSILLECTOMY AND ADENOIDECTOMY      TOTAL HIP ARTHROPLASTY Right 01/2020       SUBJECTIVE:    Precautions:  Up with assistance, falls   Social history: Patient lives with spouse in a ranch home  with 3 steps  to enter with [employer] in shower grab bars    Equipment owned: [place], [address] [facility],      [address] [facility] [address]   [address]    [address]. Mobility Inpatient   How much difficulty turning over in bed?: None  How much difficulty sitting down on / standing up from a chair with arms?: A Little  How much difficulty moving from lying on back to sitting on side of bed?: A Little  How much help from another person moving to and from a bed to a chair?: A Little  How much help from another person needed to walk in hospital room?: A Little  How much help from another person for climbing 3-5 steps with a railing?: A Lot  AM-PAC Inpatient Mobility Raw Score : 18  AM-PAC Inpatient T-Scale Score : 43.63  Mobility Inpatient CMS 0-100% Score: 46.58  Mobility Inpatient CMS G-Code Modifier : CK    Nursing cleared patient for PT treatment. . Patient did report she was sick to stomach earlier but feels better now  OBJECTIVE;   Initial Evaluation  Date: 12/16/2021 Treatment Date:    12/17/2021   Short Term/ Long Term   Goals   Was pt agreeable to Eval/treatment? Yes Yes To be met in 3 days   Pain level   8/10  L ankle, chronic 8/10  Left ankle chronic pain    Bed Mobility    Rolling: Supervision     Supine to sit: Supervision     Sit to supine: Supervision     Scooting: Supervision    Rolling: Independent   Supine to sit: Supervision    Sit to supine: Supervision    Scooting: Independent    Rolling: Independent    Supine to sit:  Independent    Sit to supine: Independent    Scooting: Independent     Transfers Sit to stand: Supervision   Sit to stand: Supervision       Sit to stand: Independent    Ambulation    2 x 30 feet using  no device with Minimal assist of 1   for balance, weight shift, multiplane instability and safety 2x100 feet using  wheeled walker with Supervision        > 150 feet using  least restrictive device versus no device with Independent    Stair negotiation: ascended and descended   Not assessed  Patient reports she is not worried about steps, declined them    3 steps with 1 HR with Mod I   ROM Within functional limits    Increase range of motion 10% of affected joints    Strength BUE:  refer to OT eval  RLE:  4/5  LLE:  4/5  Increase strength in affected mm groups by 1/3 grade   Balance Sitting EOB:  good-  Dynamic Standing:  fair- Sitting EOB: good   Dynamic Standing: good -   Sitting EOB:  good   Dynamic Standing: fair      Patient is Alert & Oriented x person, place, time and situation and follows directions    Sensation:  Patient  denies numbness/tingling   Edema:  none noted   Endurance: fair     Vitals: room air Blood Pressure at rest  Blood Pressure during session    Heart Rate at rest  Heart Rate during session    SPO2 at rest %  SPO2 during session %     Patient education  Patient educated on role of Physical Therapy, risks of immobility, safety and plan of care, energy conservation,  importance of mobility while in hospital  and safety      Patient response to education:   Pt verbalized understanding Pt demonstrated skill Pt requires further education in this area   Yes Partial Yes      Treatment:  Patient practiced and was instructed/facilitated in the following treatment: Patient Sat edge of bed 5 minutes with Independent to increase dynamic sitting balance and activity tolerance. Patient stood, amb out in hallway, no losses of balance, ambulated back to room, returned to supine. demostrated exercises for patient to perform in bed or sitting EOB. Therapeutic Exercises:  ankle pumps, hip abduction/adduction, straight leg raise, long arc quad and seated marching      At end of session, patient in bed with call light and phone within reach,  all lines and tubes intact, nursing notified. Patient would benefit from continued skilled Physical Therapy to improve functional independence and quality of life.          Patient's/ family goals   get stronger    Time in  1330  Time out  1344    Total Treatment Time  14 minutes    CPT codes:  Therapeutic exercises (81703)   2 minutes  0 unit(s)  Gait Training (44170) 12 minutes 1 unit(s)    Scott Lund #[id number]

## 2021-12-17 NOTE — PROGRESS NOTES
[address] [facility]  [facility] Ariel Muhammad. [place]        Date:2021                                                  Patient Name: Kaitlin Tinoco    MRN: [id number]    : 1940    Room: [address] [place], [place] [id number]-      Evaluating OT: Nicollepaige Kj OTR/L #[id number]     Referring Provider and Specific Provider Orders/Date:      21 1030  OT eval and treat  Start:  21 1030,   End:  21 1030,   ONE TIME,   Standing Count:  1 Occurrences,   R         Stephanie Cordova, DO      Placement Recommendation: HHOT        Diagnosis:   1. Acute ischemic stroke [facility])           Surgery: None      Pertinent Medical History:       Past Medical History:   Diagnosis Date    Diverticulosis     Hemorrhoids, internal     Irritable bowel syndrome     Osteoarthritis     rt knee    Osteoporosis     Seizures ([facility] [address]     complex partial  last seizure ?     Urticaria, chronic          Past Surgical History:   Procedure Laterality Date    APPENDECTOMY      CARPAL TUNNEL RELEASE Left     CHOLECYSTECTOMY      COLONOSCOPY      FINGER TRIGGER RELEASE Left     HC INJECT OTHER PERPHRL NERV Right 10/8/2019    RIGHT HIP INJECTION (CPT 84744) performed by Zia Pickett MD at [address] [facility] [address] [place] [address] Right 2019    RIGHT SACROILIAC JOINT INJECTION performed by Zia Pickett MD at [address] [facility] Right 10/31/2014    Laparoscopic right inguinal hernia repair with mesh     HYSTERECTOMY      vaginal    KNEE ARTHROSCOPY      right    NERVE BLOCK Right 2019    sacroiliac joint    NERVE BLOCK Right 10/08/2019    hip injection    TONSILLECTOMY AND ADENOIDECTOMY      TOTAL HIP ARTHROPLASTY Right 2020        Precautions:  Fall Risk      Assessment of current deficits    [x] Functional mobility  [x]ADLs  [x] Strength               []Cognition    [x] Functional transfers   [x] IADLs [] Safety Awareness   [x]Endurance    [] Fine Coordination              [x] Balance      [] Vision/perception   []Sensation     []Gross Motor Coordination  [] ROM  [] Delirium                   [] Motor Control     OT PLAN OF CARE   OT POC based on physician orders, patient diagnosis and results of clinical assessment    Frequency/Duration 1-3 days/wk for 2 weeks PRN     Specific OT Treatment Interventions to include:   * Instruction/training on adapted ADL techniques and AE recommendations to increase functional independence within precautions       * Training on energy conservation strategies, correct breathing pattern and techniques to improve independence/tolerance for self-care routine  * Functional transfer/mobility training/DME recommendations for increased independence, safety, and fall prevention  * Patient/Family education to increase follow through with safety techniques and functional independence  * Recommendation of environmental modifications for increased safety with functional transfers/mobility and ADLs  * Therapeutic exercise to improve motor endurance, ROM, and functional strength for ADLs/functional transfers  * Therapeutic activities to facilitate/challenge dynamic balance, stand tolerance for increased safety and independence with ADLs    Recommended Adaptive Equipment: None      Home Living: Lives with spouse, single family home, 2 story, 3 steps to enter with rail. Bedroom/bathroom located on 1st floor. Bathroom set-up: walk-in shower         Equipment owned: shower stool, wheeled walker, 3:1 commode     Prior Level of Function: Independent with ADLs , son assists with IADLs; ambulated with wheeled walker     Driving: no   Occupation: n/a  Enjoys: baking      Pain Level: pt denied pain.       Cognition: A&O: 4/4; Follows 3 step directions   Memory: good    Sequencing: good    Problem solving: good    Judgement/safety: good     [facility]   AM-PAC Daily Activity Inpatient   How much help for putting on and taking off regular lower body clothing?: A Little  How much help for Bathing?: A Little  How much help for Toileting?: A Little  How much help for putting on and taking off regular upper body clothing?: A Little  How much help for taking care of personal grooming?: A Little  How much help for eating meals?: None  AM-PAC Inpatient Daily Activity Raw Score: 19  AM-PAC Inpatient ADL T-Scale Score : 40.22  ADL Inpatient CMS 0-100% Score: 42.8  ADL Inpatient CMS G-Code Modifier : CK     Functional Assessment:    Initial Eval Status  Date: 12/17/21 Treatment Status  Date: STGs = LTGs  Time frame: 10-14 days   Feeding Independent     Independent    Grooming Supervision   Standing at bathroom sink   Moderate Pender    UB Dressing Supervision     Moderate Pender    LB Dressing Supervision   To doff/son socks seated at EOB  Moderate Pender    Bathing Minimal Assist     Moderate Pender    Toileting Stand by Assist   For perineal hygiene seated on commode   Moderate Pender    Bed Mobility  Supine to sit: Supervision   Sit to supine:  NT  Supine to sit: Independent   Sit to supine: Independent    Functional Transfers Sit to stand: SBA  Stand to sit: SBA    Transfer training with verbal cues for hand placement throughout session to improve safety. Moderate Pender with use of walker    Functional Mobility Stand by Assist with wheeled walker to improve balance to/from bathroom, verbal cues for walker sequence and safety.      Moderate Pender with use of walker   Balance Sitting:     Static: SBA    Dynamic: SBA  Standing: SBA with walker   Sitting:     Static: Indep    Dynamic: Indep  Standing: Indep with walker    Activity Tolerance fair  plus/good   Increase standing tolerance >3 minutes for improved engagement with functional transfers and indep in ADLs   Visual/  Perceptual Glasses: yes     Reports changes in vision since admission: no ; visual screen intact      NA Hand Dominance: right      AROM (PROM) Strength Additional Info:    RUE  WFL 3+/5 to 4-/5  good  and wfl FMC/dexterity noted during ADL tasks     LUE WFL 3+/5 good  and wfl FMC/dexterity noted during ADL tasks       Hearing:  [facility]   Sensation:   No c/o numbness or tingling  Tone:  WFL   Edema:      Vitals:  HR at rest:  bpm HR with activity:  bpm HR at end of session:  bpm   SpO2 at rest: % SpO2 with activity: % SpO2 at end of session: %   BP at rest:  BP with activity: BP at end of session:      Comments: RN cleared patient for OT. Upon arrival patient in supine. Therapist facilitated and instructed pt on adapted  techniques & compensatory strategies to improve safety and independence with basic ADLs, bed mobility, functional transfers and mobility to allow pt to achieve highest level of independence and safely. Pt demonstrated good understanding of education & follow through. At end of session, patient was in chair with call light and phone within reach, all lines and tubes intact. Overall, patient demonstrated  decreased independence and safety during completion of ADL tasks. Pt would benefit from continued skilled OT to increase safety and independence with completion of ADL tasks and functional mobility for improved quality of life. Rehab Potential: Good for established goals. Patient / Family Goal: return home       Patient and/or family were instructed on functional diagnosis, prognosis/goals and OT plan of care. Demonstrated good understanding.      Eval Complexity: Low    Time In: 8:22 AM   Time Out: 8:42 AM    Total Treatment Time: 0       Min Units   OT Eval Low 97165  X  1    OT Eval Medium 68488      OT Eval High 52263      OT Re-Eval [id number]            ADL/Self Care 51479     Therapeutic Activities 19655       Therapeutic Ex 86012       Orthotic Management 39125       Manual [id number]     Neuro Re-Ed 10680       Non-Billable Time        Evaluation Time additionally includes thorough review of current medical information, gathering information on past medical history/social history and prior level of function, interpretation of standardized testing/informal observation of tasks, assessment of data and development of plan of care and goals.         Evaluating OT: Bartolo Jackson OTR/L #[id number]

## 2021-12-18 VITALS
HEIGHT: 64 IN | TEMPERATURE: 97.7 F | HEART RATE: 77 BPM | BODY MASS INDEX: 22.53 KG/M2 | SYSTOLIC BLOOD PRESSURE: 130 MMHG | DIASTOLIC BLOOD PRESSURE: 61 MMHG | OXYGEN SATURATION: 96 % | WEIGHT: 132 LBS | RESPIRATION RATE: 16 BRPM

## 2021-12-18 LAB
ANION GAP SERPL CALCULATED.3IONS-SCNC: 12 MMOL/L (ref 7–16)
BASOPHILS ABSOLUTE: 0.01 E9/L (ref 0–0.2)
BASOPHILS RELATIVE PERCENT: 0.2 % (ref 0–2)
BUN BLDV-MCNC: 13 MG/DL (ref 6–23)
C-REACTIVE PROTEIN: 1.2 MG/DL (ref 0–0.4)
CALCIUM SERPL-MCNC: 8.5 MG/DL (ref 8.6–10.2)
CHLORIDE BLD-SCNC: 101 MMOL/L (ref 98–107)
CO2: 24 MMOL/L (ref 22–29)
CREAT SERPL-MCNC: 0.7 MG/DL (ref 0.5–1)
EOSINOPHILS ABSOLUTE: 0.09 E9/L (ref 0.05–0.5)
EOSINOPHILS RELATIVE PERCENT: 2 % (ref 0–6)
GFR AFRICAN AMERICAN: >60
GFR NON-AFRICAN AMERICAN: >60 ML/MIN/1.73
GLUCOSE BLD-MCNC: 91 MG/DL (ref 74–99)
HCT VFR BLD CALC: 40.6 % (ref 34–48)
HEMOGLOBIN: 13.4 G/DL (ref 11.5–15.5)
IMMATURE GRANULOCYTES #: 0.02 E9/L
IMMATURE GRANULOCYTES %: 0.4 % (ref 0–5)
LYMPHOCYTES ABSOLUTE: 1.49 E9/L (ref 1.5–4)
LYMPHOCYTES RELATIVE PERCENT: 32.7 % (ref 20–42)
MCH RBC QN AUTO: 29.5 PG (ref 26–35)
MCHC RBC AUTO-ENTMCNC: 33 % (ref 32–34.5)
MCV RBC AUTO: 89.2 FL (ref 80–99.9)
MONOCYTES ABSOLUTE: 0.35 E9/L (ref 0.1–0.95)
MONOCYTES RELATIVE PERCENT: 7.7 % (ref 2–12)
NEUTROPHILS ABSOLUTE: 2.59 E9/L (ref 1.8–7.3)
NEUTROPHILS RELATIVE PERCENT: 57 % (ref 43–80)
PDW BLD-RTO: 12.9 FL (ref 11.5–15)
PLATELET # BLD: 174 E9/L (ref 130–450)
PMV BLD AUTO: 10.2 FL (ref 7–12)
POTASSIUM SERPL-SCNC: 3.7 MMOL/L (ref 3.5–5)
RBC # BLD: 4.55 E12/L (ref 3.5–5.5)
SEDIMENTATION RATE, ERYTHROCYTE: 1 MM/HR (ref 0–20)
SODIUM BLD-SCNC: 137 MMOL/L (ref 132–146)
WBC # BLD: 4.6 E9/L (ref 4.5–11.5)

## 2021-12-18 PROCEDURE — 85651 RBC SED RATE NONAUTOMATED: CPT

## 2021-12-18 PROCEDURE — 80048 BASIC METABOLIC PNL TOTAL CA: CPT

## 2021-12-18 PROCEDURE — 86140 C-REACTIVE PROTEIN: CPT

## 2021-12-18 PROCEDURE — 6370000000 HC RX 637 (ALT 250 FOR IP): Performed by: INTERNAL MEDICINE

## 2021-12-18 PROCEDURE — 85025 COMPLETE CBC W/AUTO DIFF WBC: CPT

## 2021-12-18 PROCEDURE — 36415 COLL VENOUS BLD VENIPUNCTURE: CPT

## 2021-12-18 RX ORDER — ASPIRIN 81 MG/1
162 TABLET, CHEWABLE ORAL DAILY
Qty: 60 TABLET | Refills: 3 | Status: SHIPPED | OUTPATIENT
Start: 2021-12-19 | End: 2022-06-15

## 2021-12-18 RX ORDER — ATORVASTATIN CALCIUM 40 MG/1
40 TABLET, FILM COATED ORAL NIGHTLY
Qty: 30 TABLET | Refills: 0 | Status: SHIPPED | OUTPATIENT
Start: 2021-12-18 | End: 2021-12-22 | Stop reason: SDUPTHER

## 2021-12-18 RX ADMIN — METOPROLOL TARTRATE 25 MG: 25 TABLET, FILM COATED ORAL at 08:22

## 2021-12-18 RX ADMIN — LAMOTRIGINE 100 MG: 100 TABLET ORAL at 08:23

## 2021-12-18 RX ADMIN — PRIMIDONE 250 MG: 250 TABLET ORAL at 08:22

## 2021-12-18 RX ADMIN — ASPIRIN 81 MG: 81 TABLET, CHEWABLE ORAL at 08:23

## 2021-12-18 RX ADMIN — CLOPIDOGREL BISULFATE 75 MG: 75 TABLET ORAL at 08:22

## 2021-12-18 ASSESSMENT — PAIN SCALES - GENERAL
PAINLEVEL_OUTOF10: 0

## 2021-12-18 NOTE — DISCHARGE SUMMARY
Internal Medicine Progress Note     [facility]     Roya Shannon. Corinna Nolasco., SOLANGE.JETHROOAnastasiaI. [place] Reynoso D.O., ELAINEOTRACY Carmona D.O. Ángel Daley, MSN, APRN, NP-C  Mihaela Console. Carmel German, MSN, [address] [facility]       Internal Medicine  Discharge Summary    NAME: Paulie Howard  :  1940  MRN:  [id number]  PCP:Cherise Goss MD  ADMITTED: 12/15/2021      DISCHARGED: 21    ADMITTING PHYSICIAN: Yuridia Duran DO    CONSULTANT(S):   IP CONSULT TO NEUROLOGY     ADMITTING DIAGNOSIS:   Acute ischemic stroke (HCC) [I63.9]     DISCHARGE DIAGNOSES:   · Strokelike symptoms (Tingling bilateral upper extremities)-resolved  · Essential hypertension, new diagnosis  · Chronic pain syndrome  · Primary insomnia  · Questionable history of seizure disorder vs possible somatic conversions      BRIEF HISTORY OF PRESENT ILLNESS:   The patient reports that yesterday at lunchtime while she was cooking she had onset of lightheadedness. States she felt like she could fall but the room was not spinning. I difficulty maintaining her balance. Patient admitted to nausea and vomiting for that time. Patient also states she has been having a lot of tingling in the upper extremities bilaterally. She states this has been going on for approximately 6 months. States she has not discussed with her primary care physician because she was scared of what the cause may be. She states at the present time she feels relatively baseline but she has not been up out of the bed yet. While being evaluated in the emergency department she was found to have left facial droop. Strength was intact. Patient was in need of further evaluation and treatment and admitted to the services of Dr. Nancy Bolivar and Dr. Edel Turpin.     LABS[de-identified]  Lab Results   Component Value Date    WBC 4.6 2021    HGB 13.4 2021    HCT 40.6 2021     2021     2021    K 3.7 2021    CL 101 2021    CREATININE 0.7 2021    BUN 13 2021    CO2 24 2021    GLUCOSE 91 2021    ALT 16 12/15/2021    AST 24 12/15/2021    INR 1.0 12/15/2021     Lab Results   Component Value Date    INR 1.0 12/15/2021    INR 1.1 2020    PROTIME 11.9 12/15/2021    PROTIME 12.4 2020      Lab Results   Component Value Date    TSH 4.570 (H) 2021     Lab Results   Component Value Date    TRIG 107 2017    TRIG 68 2016    TRIG 107 2014     Lab Results   Component Value Date    HDL 78 2017    HDL 83 2016    HDL 84 2014     Lab Results   Component Value Date    LDLCALC 104 (H) 2017    LDLCALC 93 2016    LDLCALC 98 2014     Lab Results   Component Value Date    LABA1C 5.1 2018       IMAGING:  ECHO Complete With Bubble Study    Result Date: 2021  Transthoracic Echocardiography Report (TTE)  Demographics   Patient Name        Cooper Lorenzo Gender               Female   Medical Record      [id number]      Room Number          8073  Number   Account #           [de-identified]     Procedure Date       2021   Corporate ID                      Ordering Physician   Sue Landers   Accession Number    [id number]    Referring Physician   Date of Birth       1940    Sonographer          Darren Steen [facility]   Age                 [de-identified] year(s)    Interpreting         Arnel Posadas DO                                    Physician                                     Any Other  Procedure Type of Study   TTE procedure:Echo Complete W/Doppler & Color Flow, Echo W/Bubble Study. Procedure Date Date: 2021 Start: 10:53 AM Study Location: Echo Lab Technical Quality: Poor visualization due to poor acoustical window. Indications:R/O PFO/ASD.  Patient Status: Routine Height: 64 inches Weight: 129 pounds BSA: 1.62 m^2 BMI: 22.14 kg/m^2 BP: 122/58 mmHg  Findings   Left Ventricle  Left ventricular size is grossly normal.  No evidence of left ----------------------------------------------------------------  Electronically signed by David JASONInterpreting  physician) on 12/17/2021 06:09 PM  ----------------------------------------------------------------  M-Mode/2D Measurements & Calculations   LV Diastolic    LV Systolic Dimension: 2.5   AV Cusp Separation: 1 cmLA  Dimension: 4.1  cm                           Dimension: 3.1 cmAO Root  cm              LV Volume Diastolic: 17.9 ml Dimension: 2.4 cm  LV FS:39 %      LV Volume Systolic: 23 ml  LV PW           LV EDV/LV EDV Index: 65.3  Diastolic: 1.1  QO/14 UH/U^6CW ESV/LV ESV  cm              Index: 23 ml/14ml/ m^2       RV Diastolic Dimension: 2.6  LV PW Systolic: EF Calculated: 69 %          cm  1.3 cm          LV Mass Index: 87 l/min*m^2  RV Systolic Dimension: 2.2 cm  Septum                                       LA/Aorta: 5.39  Diastolic: 1 cm  Septum          LVOT: 2 cm                   LA volume/Index: 47.8 ml  Systolic: 1 cm   LV Mass: 141.55  g  Doppler Measurements & Calculations   MV Peak E-Wave:   AV Peak Velocity: 1.63 m/s     LVOT Peak Velocity: 0.87  0.64 m/s          AV Peak Gradient: 10.61 mmHg   m/s  MV Peak A-Wave:   AV Mean Velocity: 1.11 m/s     LVOT Mean Velocity: 0.57  0.83 m/s          AV Mean Gradient: 5.6 mmHg     m/s  MV E/A Ratio:     AV VTI: 36.5 cm                LVOT Peak Gradient: 3  0.77              AV Area (Continuity):1.92 cm^2 mmHgLVOT Mean Gradient:  MV Peak Gradient: AV Deceleration Time: 2206.3   1.5 mmHg  2.5 mmHg          msec                           Estimated RVSP: 36.3 mmHg  MV Mean Gradient: LVOT VTI: 22.3 cm              Estimated RAP:10 mmHg  1.1 mmHg  MV Mean Velocity: Estimated PASP: 36.34 mmHg  0.49 m/s          Pulm. Vein A Reversal          TR Velocity:2.57 m/s  MV Deceleration   Duration:120 msec              TR Gradient:26.34 mmHg  Time: 224.5 msec  Pulm. Vein D Velocity:0.33     PV Peak Velocity: 0.74  MV P1/2t: 44.5    m/sPulm.  Vein A Reversal m/s  msec              Velocity:0.33 m/s              PV Peak Gradient: 2.19  MVA by PHT:4.94   Pulm. Vein S Velocity: 0.44    mmHg  cm^2              m/s                            PV Mean Velocity: 0.6 m/s  MV Area                                          PV Mean Gradient: 1.5  (continuity): 3.1                                mmHg  cm^2  [email or web address] [id number]    CT HEAD WO CONTRAST    Result Date: 12/15/2021  EXAMINATION: CTA OF THE HEAD WITH CONTRAST; CT OF THE HEAD WITHOUT CONTRAST; CTA OF THE NECK; CT OF THE HEAD WITH CONTRAST 12/15/2021 4:27 pm: TECHNIQUE: CTA of the head/brain was performed with the administration of intravenous contrast. Multiplanar reformatted images are provided for review. MIP images are provided for review. Dose modulation, iterative reconstruction, and/or weight based adjustment of the mA/kV was utilized to reduce the radiation dose to as low as reasonably achievable.; CT of the head was performed without the administration of intravenous contrast. Dose modulation, iterative reconstruction, and/or weight based adjustment of the mA/kV was utilized to reduce the radiation dose to as low as reasonably achievable.; CTA of the neck was performed with the administration of intravenous contrast. Multiplanar reformatted images are provided for review. MIP images are provided for review. Stenosis of the internal carotid arteries measured using NASCET criteria. Dose modulation, iterative reconstruction, and/or weight based adjustment of the mA/kV was utilized to reduce the radiation dose to as low as reasonably achievable.; CT of the head/brain was performed with the administration of intravenous contrast. Multiplanar reformatted images are provided for review.  Dose modulation, iterative reconstruction, and/or weight based adjustment of the mA/kV was utilized to reduce the radiation dose to as low as reasonably achievable. Noncontrast CT of the head with reconstructed 2-D images are also provided for review. COMPARISON: None. HISTORY: ORDERING SYSTEM PROVIDED HISTORY: CVA TECHNOLOGIST PROVIDED HISTORY: Has a \"code stroke\" or \"stroke alert\" been called? ->Yes Reason for exam:->CVA Decision Support Exception - unselect if not a suspected or confirmed emergency medical condition->Emergency Medical Condition (MA); ORDERING SYSTEM PROVIDED HISTORY: CVA TECHNOLOGIST PROVIDED HISTORY: Reason for exam:->CVA Decision Support Exception - unselect if not a suspected or confirmed emergency medical condition->Emergency Medical Condition (MA) FINDINGS: CT HEAD: BRAIN/VENTRICLES:  No mass effect, edema or hemorrhage is seen. Mild volume loss is seen in the cerebrum with mild chronic microvascular ischemic changes. No hydrocephalus or extra-axial fluid is seen. ORBITS: The visualized portion of the orbits demonstrate no acute abnormality. SINUSES:  The visualized paranasal sinuses and mastoid air cells demonstrate no acute abnormality. SOFT TISSUES/SKULL: No acute abnormality of the visualized skull or soft tissues. CTA NECK: AORTIC ARCH/ARCH VESSELS: No significant stenosis or dissection is seen in the aortic arch or its major branches. 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 The innominate artery measures approximately 13 mm in maximal transverse dimension. The right subclavian artery measures approximately 11 mm in the maximum transverse dimension. (By comparison, the left transverse artery measures approximately 8 mm). CAROTID ARTERIES: No dissection, arterial injury, or hemodynamically significant stenosis by NASCET criteria. VERTEBRAL ARTERIES: No dissection, arterial injury, or significant stenosis. SOFT TISSUES: The lung apices are clear. No cervical or superior mediastinal lymphadenopathy. The larynx and pharynx are unremarkable. No acute abnormality of the salivary and thyroid glands. Multinodular thyroid gland, with the dominant partially cystic 2.1 cm nodule in the right thyroid lobe. BONES: No acute osseous abnormality. Degenerative changes in the temporomandibular joints bilaterally, moderate on the right and severe on the left. It is CTA HEAD: ANTERIOR CIRCULATION: No significant stenosis of the intracranial internal carotid or anterior cerebral arteries. There is a focal moderate to severe stenosis within a M2 branch of the left MCA (series 607, image 60). No aneurysm. POSTERIOR CIRCULATION: No significant stenosis of the vertebral, basilar, or posterior cerebral arteries. No aneurysm. OTHER: No dural venous sinus thrombosis on this non-dedicated study. CT PERFUSION: EXAM QUALITY: The examination is diagnostic with appropriate arterial inflow and venous outflow curves, and diagnostic perfusion maps. CORE INFARCT: The total area of ischemic core is 0 mL (CBF<30% volume). TOTAL HYPOPERFUSION: The total area of hypoperfusion is 0 mL (Tmax>6s volume). PENUMBRA: The penumbra (mismatch) volume is 0 mL and is located in the n/a. The mismatch ratio is n/a. NONCONTRAST CT OF THE HEAD: 1.  No acute intracranial abnormality. CT PERFUSION OF THE HEAD: 1. No evidence of ischemia. CTA OF THE HEAD: 1. Focal MODERATE TO SEVERE GRADE STENOSIS IN A M2 BRANCH OF THE LEFT MCA. 2. Otherwise, unremarkable CTA of the brain. CTA OF THE NECK: 1. The cervical carotid and vertebral arteries are widely patent. 2. Tortuous and ectatic right innominate artery, which bifurcates in the lower anterior neck deform the right common carotid and subclavian arteries. The right subclavian artery is also ectatic. Findings are grossly stable as of the previous CTA from 01/23/2020. 3. Multinodular thyroid gland with the dominant nodule measuring approximately 2.1 cm. Sonographic evaluation recommended. Critical results: Findings of the noncontrast enhanced CT were called by Dr. Dottie Aviles to Dr. Rashawn Marte on 12/15/2021 at 17:01. RECOMMENDATIONS: Unavailable     MRI CERVICAL SPINE WO CONTRAST    Result Date: 12/16/2021  EXAMINATION: MRI OF THE CERVICAL SPINE WITHOUT CONTRAST 12/16/2021 3:20 pm TECHNIQUE: Multiplanar multisequence MRI of the cervical spine was performed without the administration of intravenous contrast. COMPARISON: None. HISTORY: ORDERING SYSTEM PROVIDED HISTORY: numbness and tingling TECHNOLOGIST PROVIDED HISTORY: Reason for exam:->numbness and tingling FINDINGS: Motion artifact limits evaluation of some images. Allowing for this limitation, the findings are as follows: Bones: Vertebral body heights are maintained. Grade 1 degenerative anterolisthesis of C3 on C4. No marrow edema is seen. Paraspinal: No paraspinal soft tissue abnormality is seen. Intracranial: Unremarkable appearance of the visualized intracranial structures. Epidural space: No evidence of epidural collection. Cord: No evidence of cervical spinal cord signal abnormality. C2-3: No significant spinal or neuroforaminal stenosis are seen. C3-4: Mild spinal stenosis secondary to spondylolisthesis and ligamentum flavum thickening. No significant neural foraminal stenosis. C4-5: No significant spinal or neuroforaminal stenosis are seen. C5-6: Mild spinal stenosis secondary to disc osteophyte complex and ligamentum flavum thickening. Moderate left neural foraminal stenosis secondary to facet/uncovertebral hypertrophy. C6-7: Minimal spinal stenosis secondary to disc osteophyte complex. No significant neural foraminal stenosis. C7-T1: No significant spinal or neuroforaminal stenosis are seen. Degenerative changes result in mild spinal stenosis at C3-4 and C5-6, and minimal spinal stenosis at C6-7. No high-grade spinal stenosis is evident.  There is moderate left neural foraminal stenosis at C5-6.     CTA NECK W CONTRAST    Result Date: 12/15/2021  EXAMINATION: CTA OF THE HEAD WITH CONTRAST; CT OF THE HEAD WITHOUT CONTRAST; CTA OF THE NECK; CT OF THE HEAD WITH CONTRAST 12/15/2021 4:27 pm: TECHNIQUE: CTA of the head/brain was performed with the administration of intravenous contrast. Multiplanar reformatted images are provided for review. MIP images are provided for review. Dose modulation, iterative reconstruction, and/or weight based adjustment of the mA/kV was utilized to reduce the radiation dose to as low as reasonably achievable.; CT of the head was performed without the administration of intravenous contrast. Dose modulation, iterative reconstruction, and/or weight based adjustment of the mA/kV was utilized to reduce the radiation dose to as low as reasonably achievable.; CTA of the neck was performed with the administration of intravenous contrast. Multiplanar reformatted images are provided for review. MIP images are provided for review. Stenosis of the internal carotid arteries measured using NASCET criteria. Dose modulation, iterative reconstruction, and/or weight based adjustment of the mA/kV was utilized to reduce the radiation dose to as low as reasonably achievable.; CT of the head/brain was performed with the administration of intravenous contrast. Multiplanar reformatted images are provided for review. Dose modulation, iterative reconstruction, and/or weight based adjustment of the mA/kV was utilized to reduce the radiation dose to as low as reasonably achievable. Noncontrast CT of the head with reconstructed 2-D images are also provided for review. COMPARISON: None. HISTORY: ORDERING SYSTEM PROVIDED HISTORY: CVA TECHNOLOGIST PROVIDED HISTORY: Has a \"code stroke\" or \"stroke alert\" been called? ->Yes Reason for exam:->CVA Decision Support Exception - unselect if not a suspected or confirmed emergency medical condition->Emergency Medical Condition (MA); ORDERING SYSTEM PROVIDED HISTORY: CVA TECHNOLOGIST PROVIDED HISTORY: Reason for exam:->CVA Decision Support Exception - unselect if not a suspected or confirmed emergency medical condition->Emergency Medical Condition (MA) FINDINGS: CT HEAD: BRAIN/VENTRICLES:  No mass effect, edema or hemorrhage is seen. Mild volume loss is seen in the cerebrum with mild chronic microvascular ischemic changes. No hydrocephalus or extra-axial fluid is seen. ORBITS: The visualized portion of the orbits demonstrate no acute abnormality. SINUSES:  The visualized paranasal sinuses and mastoid air cells demonstrate no acute abnormality. SOFT TISSUES/SKULL: No acute abnormality of the visualized skull or soft tissues. CTA NECK: AORTIC ARCH/ARCH VESSELS: No significant stenosis or dissection is seen in the aortic arch or its major branches. 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 The innominate artery measures approximately 13 mm in maximal transverse dimension. The right subclavian artery measures approximately 11 mm in the maximum transverse dimension. (By comparison, the left transverse artery measures approximately 8 mm). CAROTID ARTERIES: No dissection, arterial injury, or hemodynamically significant stenosis by NASCET criteria. VERTEBRAL ARTERIES: No dissection, arterial injury, or significant stenosis. SOFT TISSUES: The lung apices are clear. No cervical or superior mediastinal lymphadenopathy. The larynx and pharynx are unremarkable. No acute abnormality of the salivary and thyroid glands. Multinodular thyroid gland, with the dominant partially cystic 2.1 cm nodule in the right thyroid lobe. BONES: No acute osseous abnormality. Degenerative changes in the temporomandibular joints bilaterally, moderate on the right and severe on the left.   It is CTA HEAD: ANTERIOR CIRCULATION: No significant stenosis of the intracranial internal carotid or anterior cerebral arteries. There is a focal moderate to severe stenosis within a M2 branch of the left MCA (series 607, image 60). No aneurysm. POSTERIOR CIRCULATION: No significant stenosis of the vertebral, basilar, or posterior cerebral arteries. No aneurysm. OTHER: No dural venous sinus thrombosis on this non-dedicated study. CT PERFUSION: EXAM QUALITY: The examination is diagnostic with appropriate arterial inflow and venous outflow curves, and diagnostic perfusion maps. CORE INFARCT: The total area of ischemic core is 0 mL (CBF<30% volume). TOTAL HYPOPERFUSION: The total area of hypoperfusion is 0 mL (Tmax>6s volume). PENUMBRA: The penumbra (mismatch) volume is 0 mL and is located in the n/a. The mismatch ratio is n/a. NONCONTRAST CT OF THE HEAD: 1.  No acute intracranial abnormality. CT PERFUSION OF THE HEAD: 1. No evidence of ischemia. CTA OF THE HEAD: 1. Focal MODERATE TO SEVERE GRADE STENOSIS IN A M2 BRANCH OF THE LEFT MCA. 2. Otherwise, unremarkable CTA of the brain. CTA OF THE NECK: 1. The cervical carotid and vertebral arteries are widely patent. 2. Tortuous and ectatic right innominate artery, which bifurcates in the lower anterior neck deform the right common carotid and subclavian arteries. The right subclavian artery is also ectatic. Findings are grossly stable as of the previous CTA from 01/23/2020. 3. Multinodular thyroid gland with the dominant nodule measuring approximately 2.1 cm. Sonographic evaluation recommended. Critical results: Findings of the noncontrast enhanced CT were called by Dr. Fransisca Steen to Dr. Neeru Norman on 12/15/2021 at 17:01.  RECOMMENDATIONS: Unavailable     CT BRAIN PERFUSION    Result Date: 12/15/2021  EXAMINATION: CTA OF THE HEAD WITH CONTRAST; CT OF THE HEAD WITHOUT CONTRAST; CTA OF THE NECK; CT OF THE HEAD WITH CONTRAST 12/15/2021 4:27 pm: TECHNIQUE: CTA of the head/brain was performed with the administration of intravenous contrast. Multiplanar reformatted images are provided for review. MIP images are provided for review. Dose modulation, iterative reconstruction, and/or weight based adjustment of the mA/kV was utilized to reduce the radiation dose to as low as reasonably achievable.; CT of the head was performed without the administration of intravenous contrast. Dose modulation, iterative reconstruction, and/or weight based adjustment of the mA/kV was utilized to reduce the radiation dose to as low as reasonably achievable.; CTA of the neck was performed with the administration of intravenous contrast. Multiplanar reformatted images are provided for review. MIP images are provided for review. Stenosis of the internal carotid arteries measured using NASCET criteria. Dose modulation, iterative reconstruction, and/or weight based adjustment of the mA/kV was utilized to reduce the radiation dose to as low as reasonably achievable.; CT of the head/brain was performed with the administration of intravenous contrast. Multiplanar reformatted images are provided for review. Dose modulation, iterative reconstruction, and/or weight based adjustment of the mA/kV was utilized to reduce the radiation dose to as low as reasonably achievable. Noncontrast CT of the head with reconstructed 2-D images are also provided for review. COMPARISON: None. HISTORY: ORDERING SYSTEM PROVIDED HISTORY: CVA TECHNOLOGIST PROVIDED HISTORY: Has a \"code stroke\" or \"stroke alert\" been called? ->Yes Reason for exam:->CVA Decision Support Exception - unselect if not a suspected or confirmed emergency medical condition->Emergency Medical Condition (MA); ORDERING SYSTEM PROVIDED HISTORY: CVA TECHNOLOGIST PROVIDED HISTORY: Reason for exam:->CVA Decision Support Exception - unselect if not a suspected or confirmed emergency medical condition->Emergency Medical Condition (MA) FINDINGS: CT HEAD: BRAIN/VENTRICLES:  No mass effect, edema or hemorrhage is seen.   Mild volume loss is seen in the cerebrum with mild chronic microvascular ischemic changes. No hydrocephalus or extra-axial fluid is seen. ORBITS: The visualized portion of the orbits demonstrate no acute abnormality. SINUSES:  The visualized paranasal sinuses and mastoid air cells demonstrate no acute abnormality. SOFT TISSUES/SKULL: No acute abnormality of the visualized skull or soft tissues. CTA NECK: AORTIC ARCH/ARCH VESSELS: No significant stenosis or dissection is seen in the aortic arch or its major branches. 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 The innominate artery measures approximately 13 mm in maximal transverse dimension. The right subclavian artery measures approximately 11 mm in the maximum transverse dimension. (By comparison, the left transverse artery measures approximately 8 mm). CAROTID ARTERIES: No dissection, arterial injury, or hemodynamically significant stenosis by NASCET criteria. VERTEBRAL ARTERIES: No dissection, arterial injury, or significant stenosis. SOFT TISSUES: The lung apices are clear. No cervical or superior mediastinal lymphadenopathy. The larynx and pharynx are unremarkable. No acute abnormality of the salivary and thyroid glands. Multinodular thyroid gland, with the dominant partially cystic 2.1 cm nodule in the right thyroid lobe. BONES: No acute osseous abnormality. Degenerative changes in the temporomandibular joints bilaterally, moderate on the right and severe on the left. It is CTA HEAD: ANTERIOR CIRCULATION: No significant stenosis of the intracranial internal carotid or anterior cerebral arteries. There is a focal moderate to severe stenosis within a M2 branch of the left MCA (series 607, image 60). No aneurysm. POSTERIOR CIRCULATION: No significant stenosis of the vertebral, basilar, or posterior cerebral arteries. No aneurysm.  OTHER: No dural venous sinus thrombosis on this non-dedicated study. CT PERFUSION: EXAM QUALITY: The examination is diagnostic with appropriate arterial inflow and venous outflow curves, and diagnostic perfusion maps. CORE INFARCT: The total area of ischemic core is 0 mL (CBF<30% volume). TOTAL HYPOPERFUSION: The total area of hypoperfusion is 0 mL (Tmax>6s volume). PENUMBRA: The penumbra (mismatch) volume is 0 mL and is located in the n/a. The mismatch ratio is n/a. NONCONTRAST CT OF THE HEAD: 1.  No acute intracranial abnormality. CT PERFUSION OF THE HEAD: 1. No evidence of ischemia. CTA OF THE HEAD: 1. Focal MODERATE TO SEVERE GRADE STENOSIS IN A M2 BRANCH OF THE LEFT MCA. 2. Otherwise, unremarkable CTA of the brain. CTA OF THE NECK: 1. The cervical carotid and vertebral arteries are widely patent. 2. Tortuous and ectatic right innominate artery, which bifurcates in the lower anterior neck deform the right common carotid and subclavian arteries. The right subclavian artery is also ectatic. Findings are grossly stable as of the previous CTA from 01/23/2020. 3. Multinodular thyroid gland with the dominant nodule measuring approximately 2.1 cm. Sonographic evaluation recommended. Critical results: Findings of the noncontrast enhanced CT were called by Dr. Angelito Nogueira to Dr. Ina Soto on 12/15/2021 at 17:01. RECOMMENDATIONS: Unavailable     CTA HEAD W CONTRAST    Result Date: 12/15/2021  EXAMINATION: CTA OF THE HEAD WITH CONTRAST; CT OF THE HEAD WITHOUT CONTRAST; CTA OF THE NECK; CT OF THE HEAD WITH CONTRAST 12/15/2021 4:27 pm: TECHNIQUE: CTA of the head/brain was performed with the administration of intravenous contrast. Multiplanar reformatted images are provided for review. MIP images are provided for review.  Dose modulation, iterative reconstruction, and/or weight based adjustment of the mA/kV was utilized to reduce the radiation dose to as low as reasonably achievable.; CT of the head was performed without the administration of intravenous contrast. Dose modulation, iterative reconstruction, and/or weight based adjustment of the mA/kV was utilized to reduce the radiation dose to as low as reasonably achievable.; CTA of the neck was performed with the administration of intravenous contrast. Multiplanar reformatted images are provided for review. MIP images are provided for review. Stenosis of the internal carotid arteries measured using NASCET criteria. Dose modulation, iterative reconstruction, and/or weight based adjustment of the mA/kV was utilized to reduce the radiation dose to as low as reasonably achievable.; CT of the head/brain was performed with the administration of intravenous contrast. Multiplanar reformatted images are provided for review. Dose modulation, iterative reconstruction, and/or weight based adjustment of the mA/kV was utilized to reduce the radiation dose to as low as reasonably achievable. Noncontrast CT of the head with reconstructed 2-D images are also provided for review. COMPARISON: None. HISTORY: ORDERING SYSTEM PROVIDED HISTORY: CVA TECHNOLOGIST PROVIDED HISTORY: Has a \"code stroke\" or \"stroke alert\" been called? ->Yes Reason for exam:->CVA Decision Support Exception - unselect if not a suspected or confirmed emergency medical condition->Emergency Medical Condition (MA); ORDERING SYSTEM PROVIDED HISTORY: CVA TECHNOLOGIST PROVIDED HISTORY: Reason for exam:->CVA Decision Support Exception - unselect if not a suspected or confirmed emergency medical condition->Emergency Medical Condition (MA) FINDINGS: CT HEAD: BRAIN/VENTRICLES:  No mass effect, edema or hemorrhage is seen. Mild volume loss is seen in the cerebrum with mild chronic microvascular ischemic changes. No hydrocephalus or extra-axial fluid is seen. ORBITS: The visualized portion of the orbits demonstrate no acute abnormality. SINUSES:  The visualized paranasal sinuses and mastoid air cells demonstrate no acute abnormality.  SOFT TISSUES/SKULL: No acute abnormality of the visualized skull or soft tissues. CTA NECK: AORTIC ARCH/ARCH VESSELS: No significant stenosis or dissection is seen in the aortic arch or its major branches. 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 The innominate artery measures approximately 13 mm in maximal transverse dimension. The right subclavian artery measures approximately 11 mm in the maximum transverse dimension. (By comparison, the left transverse artery measures approximately 8 mm). CAROTID ARTERIES: No dissection, arterial injury, or hemodynamically significant stenosis by NASCET criteria. VERTEBRAL ARTERIES: No dissection, arterial injury, or significant stenosis. SOFT TISSUES: The lung apices are clear. No cervical or superior mediastinal lymphadenopathy. The larynx and pharynx are unremarkable. No acute abnormality of the salivary and thyroid glands. Multinodular thyroid gland, with the dominant partially cystic 2.1 cm nodule in the right thyroid lobe. BONES: No acute osseous abnormality. Degenerative changes in the temporomandibular joints bilaterally, moderate on the right and severe on the left. It is CTA HEAD: ANTERIOR CIRCULATION: No significant stenosis of the intracranial internal carotid or anterior cerebral arteries. There is a focal moderate to severe stenosis within a M2 branch of the left MCA (series 607, image 60). No aneurysm. POSTERIOR CIRCULATION: No significant stenosis of the vertebral, basilar, or posterior cerebral arteries. No aneurysm. OTHER: No dural venous sinus thrombosis on this non-dedicated study. CT PERFUSION: EXAM QUALITY: The examination is diagnostic with appropriate arterial inflow and venous outflow curves, and diagnostic perfusion maps. CORE INFARCT: The total area of ischemic core is 0 mL (CBF<30% volume).  TOTAL HYPOPERFUSION: The total area of hypoperfusion is 0 mL (Tmax>6s volume). PENUMBRA: The penumbra (mismatch) volume is 0 mL and is located in the n/a. The mismatch ratio is n/a. NONCONTRAST CT OF THE HEAD: 1.  No acute intracranial abnormality. CT PERFUSION OF THE HEAD: 1. No evidence of ischemia. CTA OF THE HEAD: 1. Focal MODERATE TO SEVERE GRADE STENOSIS IN A M2 BRANCH OF THE LEFT MCA. 2. Otherwise, unremarkable CTA of the brain. CTA OF THE NECK: 1. The cervical carotid and vertebral arteries are widely patent. 2. Tortuous and ectatic right innominate artery, which bifurcates in the lower anterior neck deform the right common carotid and subclavian arteries. The right subclavian artery is also ectatic. Findings are grossly stable as of the previous CTA from 01/23/2020. 3. Multinodular thyroid gland with the dominant nodule measuring approximately 2.1 cm. Sonographic evaluation recommended. Critical results: Findings of the noncontrast enhanced CT were called by Dr. Yissel Mendoza to Dr. Meño Terrell on 12/15/2021 at 17:01. RECOMMENDATIONS: Unavailable     MRI BRAIN WO CONTRAST    Result Date: 12/16/2021  EXAMINATION: MRI OF THE BRAIN WITHOUT CONTRAST  12/16/2021 6:52 am TECHNIQUE: Multiplanar multisequence MRI of the brain was performed without the administration of intravenous contrast. COMPARISON: None. HISTORY: ORDERING SYSTEM PROVIDED HISTORY: CVA TECHNOLOGIST PROVIDED HISTORY: Reason for exam:->CVA Initial evaluation. FINDINGS: INTRACRANIAL STRUCTURES/VENTRICLES: There is no acute infarct. No mass effect or midline shift. No evidence of an acute intracranial hemorrhage. Areas of T2 FLAIR hyperintensity are seen in the periventricular and subcortical white matter, which are nonspecific, but may represent chronic microvascular ischemic change. There is mild global parenchymal volume loss. The sellar/suprasellar regions appear unremarkable.   The normal signal voids within the major intracranial vessels appear maintained. ORBITS: The visualized portion of the orbits demonstrate no acute abnormality. SINUSES: The visualized paranasal sinuses and mastoid air cells are well aerated. BONES/SOFT TISSUES: The bone marrow signal intensity appears normal. The soft tissues demonstrate no acute abnormality. 1. No acute intracranial abnormality. No acute infarct. 2. Mild global parenchymal volume loss with mild chronic microvascular ischemic changes. HOSPITAL COURSE:   Damion Wilson did well throughout hospitalization. She was initially admitted December 16 with strokelike symptoms post resolution. She underwent extensive work-up and acute CVA was not identified however she was noted to have moderate to severe grade stenosis and M2 branch of the left MCA. We discussed further risk reduction including statin medication and antiplatelet therapy. She is intolerant of Plavix as she believed it caused her to dyspneic which resolved with holding this medication. We discussed alternative therapy including 2 baby aspirin daily and she is agreeable. Blood pressures have been addressed with the addition of beta-blocker therapy. Her chronic morbidities are otherwise well managed. She performed well enough that she is acceptable for discharge home as she is returned to her baseline status. She understands importance of close follow-up and strict medication compliance. BRIEF PHYSICAL EXAMINATION AND LABORATORIES ON DAY OF DISCHARGE:  VITALS:  /61   Pulse 77   Temp 97.7 °F (36.5 °C)   Resp 16   Ht 5' 4\" (1.626 m)   Wt 132 lb (59.9 kg)   SpO2 96%   BMI 22.66 kg/m²     HEENT:  PERRLA. EOMI. Sclera clear. Buccal mucosa moist.    Neck:  Supple. Trachea midline. No thyromegaly. No JVD. No bruits. Heart:  Rhythm regular, rate controlled. S1 and S2. Systolic murmur grade 2 over. Lungs:  Symmetrical. Clear to auscultation bilaterally. No wheezes. No rhonchi. No rales. Abdomen: Soft. Non-tender. Non-distended. Bowel sounds positive. No organomegaly or masses. No pain on palpation    Extremities:  Peripheral pulses present. No peripheral edema. No ulcers. Neurologic:  Alert x 3. No focal deficit. Cranial nerves grossly intact. Skin:  No petechia. No hemorrhage. No wounds. DISPOSITION:  The patient's condition is good. At this time the patient is without objective evidence of an acute process requiring continuing hospitalization or inpatient management. They are stable for discharge with outpatient follow-up. I have spoken with the patient and discussed the results of the current hospitalization, in addition to providing specific details for the plan of care and counseling regarding the diagnosis and prognosis. The plan has been discussed in detail and they are aware of the specific conditions for emergent return, as well as the importance of follow-up. Their questions are answered at this time and they are agreeable with the plan for discharge to home    DISCHARGE MEDICATIONS:   Current Discharge Medication List           Details   aspirin 81 MG chewable tablet Take 2 tablets by mouth daily  Qty: 60 tablet, Refills: 3      atorvastatin (LIPITOR) 40 MG tablet Take 1 tablet by mouth nightly  Qty: 30 tablet, Refills: 0      metoprolol tartrate (LOPRESSOR) 25 MG tablet Take 1 tablet by mouth 2 times daily  Qty: 60 tablet, Refills: 0              Details   acetaminophen (TYLENOL) 325 MG tablet Take 650 mg by mouth every 6 hours as needed for Pain      b complex vitamins capsule Take 1 capsule by mouth daily      diazePAM (VALIUM) 5 MG tablet Take 5 mg by mouth nightly.       melatonin 3 MG TABS tablet Take 3 mg by mouth nightly       lamoTRIgine (LAMICTAL) 100 MG tablet Take 1 tablet by mouth 3 times daily  Qty: 90 tablet, Refills: 0    Associated Diagnoses: Partial symptomatic epilepsy with complex partial seizures, not intractable, without status epilepticus (HCC)      primidone (MYSOLINE) 250 MG tablet Take 250 mg by mouth 3 times daily. FOLLOW UP/INSTRUCTIONS:  · This patient is instructed to follow-up with her primary care physician. · Patient is instructed to follow-up with the consults listed above as directed by them. · she is instructed to resume home medications and take new medications as indicated in the list above. · If the patient has a recurrence of symptoms, she is instructed to go to the ED. Preparing for this patient's discharge, including paperwork, orders, instructions, and meeting with patient did require > 40 minutes.     EDUARDA Jordan CNP     12/18/2021  8:35 AM

## 2021-12-20 ENCOUNTER — TELEPHONE (OUTPATIENT)
Dept: FAMILY MEDICINE CLINIC | Age: 81
End: 2021-12-20

## 2021-12-20 ENCOUNTER — CARE COORDINATION (OUTPATIENT)
Dept: CASE MANAGEMENT | Age: 81
End: 2021-12-20

## 2021-12-20 NOTE — CARE COORDINATION
[facility] Transitions Initial Follow Up Call    Call within 2 business days of discharge: Yes    Patient: Alexis Fields Patient : 1940   MRN: [id number]  Reason for Admission: Acute CVA  Discharge Date: 21 RARS: Readmission Risk Score: 9.9 ( )      Last Discharge [facility]       Complaint Diagnosis Description Type Department Provider    12/15/21 Dizziness; Hypertension Acute ischemic stroke [facility]) ED to Hosp-Admission (Discharged) (ADMITTED) Elmer 2, DO; Ovidio Fleming MD; ... Attempted to contact patient today 22 for initial TCM/hospital discharge follow up for Acute CVA. Left message on both home/mobile numbers requesting a return call back to CTN and provided contact information.       Arianna Mckinnon, APRN

## 2021-12-20 NOTE — TELEPHONE ENCOUNTER
----- Message from Dania Yip sent at 12/20/2021  8:59 AM EST -----  Subject: Appointment Request    Reason for Call: Urgent (Patient Request) Hospital Follow Up    QUESTIONS  Type of Appointment? Established Patient  Reason for appointment request? Available appointments did not meet   patient need  Additional Information for Provider? Patient is requesting hospital f/u;   was seen @ [address] [place] [address] for extremely high fever; was dx with   stroke; pt explained that Dr Erin Singer advised that she should be seen   soon   ---------------------------------------------------------------------------  --------------  CALL BACK INFO  What is the best way for the office to contact you? Do not leave any   message, patient will call back for answer  Preferred Call Back Phone Number? [phone]  ---------------------------------------------------------------------------  --------------  SCRIPT ANSWERS  Relationship to Patient? Self  (Patient requests to see provider urgently. )? Yes  (Has the patient been discharged from the hospital within 2 business days   AND does not have a Telephone Encounter - Follow Up From Hospital   documented in [address]?)? No  Do you have any questions for your primary care provider that need to be   answered prior to your appointment? (Use RN Triage if question pertains to   anything on the red flag list)? No  (Patient needs follow up visit after hospital discharge) Book first   available appointment within 7 days OF DISCHARGE, if no appt, proceed to   book the next available time slot within 14 days OF DISCHARGE AND Send   Message to Provider. [address] [facility] Follow Up appointment cannot be booked   beyond 14 Days and should result in a Message to Provider. ? Yes   Have you been diagnosed with, awaiting test results for, or told that you   are suspected of having COVID-19 (Coronavirus)?  (If patient has tested   negative or was tested as a requirement for work, school, or travel and   not based on symptoms, answer no)? No  Within the past two weeks have you developed any of the following symptoms   (answer no if symptoms have been present longer than 2 weeks or began   more than 2 weeks ago)? Fever or Chills, Cough, Shortness of breath or   difficulty breathing, Loss of taste or smell, Sore throat, Nasal   congestion, Sneezing or runny nose, Fatigue or generalized body aches   (answer no if pain is specific to a body part e.g. back pain), Diarrhea,   Headache? No  Have you had close contact with someone with COVID-19 in the last 14 days? No  (Service Expert - click yes below to proceed with [employer] As Usual   Scheduling)?  Yes

## 2021-12-21 ENCOUNTER — CARE COORDINATION (OUTPATIENT)
Dept: CASE MANAGEMENT | Age: 81
End: 2021-12-21

## 2021-12-21 DIAGNOSIS — I63.9 ACUTE CVA (CEREBROVASCULAR ACCIDENT) (HCC): Primary | ICD-10-CM

## 2021-12-21 PROCEDURE — 1111F DSCHRG MED/CURRENT MED MERGE: CPT | Performed by: FAMILY MEDICINE

## 2021-12-21 NOTE — CARE COORDINATION
[facility] Transitions Initial Follow Up Call    Call within 2 business days of discharge: Yes    Patient: Arminda Denson Patient : 1940   MRN: [id number]  Reason for Admission: Acute CVA  Discharge Date: 21 RARS: Readmission Risk Score: 9.9 ( )      Last Discharge [facility]       Complaint Diagnosis Description Type Department Provider    12/15/21 Dizziness; Hypertension Acute ischemic stroke [facility]) ED to Hosp-Admission (Discharged) (ADMITTED) Elmer 2, DO; Shivani Stephenson MD; ... Transitions of Care Initial Call    Was this an external facility discharge? No Discharge Facility: N/A    Challenges to be reviewed by the provider   Additional needs identified to be addressed with provider: No  none             Method of communication with provider : none      Advance Care Planning:   Does patient have an Advance Directive: reviewed and current. Was this a readmission? No  Patient stated reason for admission: light headedness/stroke  Patients top risk factors for readmission: lack of knowledge about disease  polypharmacy    Care Transition Nurse (CTN) contacted the patient by telephone to perform post hospital discharge assessment. Verified name and  with patient as identifiers. Provided introduction to self, and explanation of the CTN role. CTN reviewed discharge instructions, medical action plan and red flags with patient who verbalized understanding. Patient given an opportunity to ask questions and does not have any further questions or concerns at this time. Were discharge instructions available to patient? Yes. Reviewed appropriate site of care based on symptoms and resources available to patient including: PCP, Specialist, Urgent care clinics, When to call 911 and Condition related references. The patient agrees to contact the PCP office for questions related to their healthcare.      Medication reconciliation was performed with patient, who verbalizes understanding of administration of home medications. Advised obtaining a 90-day supply of all daily and as-needed medications. Covid Risk Education     Educated patient about risk for severe COVID-19 due to risk factors according to CDC guidelines. CTN reviewed discharge instructions, medical action plan and red flag symptoms with the patient who verbalized understanding. Discussed COVID vaccination status: Yes. Education provided on COVID-19 vaccination as appropriate. Discussed exposure protocols and quarantine with CDC Guidelines. Patient was given an opportunity to verbalize any questions and concerns and agrees to contact CTN or health care provider for questions related to their healthcare. Reviewed and educated patient on any new and changed medications related to discharge diagnosis. Was patient discharged with a pulse oximeter? No Discussed and confirmed pulse oximeter discharge instructions and when to notify provider or seek emergency care. CTN provided contact information. Plan for follow-up call in 5-7 days based on severity of symptoms and risk factors. Plan for next call: symptom management-Has light headedness improved? Non-face-to-face services provided:  Scheduled appointment with PCP-CTN confirmed with patient she is scheduled for HFU appt with Dr. Kyala Le (PCP) tomorrow on 12/22/21 at 9:45 am.   Scheduled appointment with Specialist-CTN confirmed with patient she completed follow up appt with Dr. Milan Pierre (neuor) yesterday 12/20/21  Obtained and reviewed discharge summary and/or continuity of care documents  Education of patient/family/caregiver/guardian to support self-management-Discussed s/s of Stroke and knowing when to seek immediate medical attention. Assessment and support for treatment adherence and medication management-Discussed bleeding precautions asociated with ASA therapy knowing when to seek immediate medical attention.      Care Transitions 24 Hour Call    Do you have any ongoing symptoms?: Yes  Patient-reported symptoms: Other (Comment: light headedness)  Do you have a copy of your discharge instructions?: Yes  Do you have all of your prescriptions and are they filled?: Yes  Have you been contacted by a [address]?: No  Have you scheduled your follow up appointment?: Yes  How are you going to get to your appointment?: Car - family or friend to transport  Were you discharged with any Home Care or Post Acute Services: No  Do you feel like you have everything you need to keep you well at home?: Yes  Care Transitions Interventions  No Identified Needs       Spoke with patient today 21 for TCM/hospital discharge follow for acute CVA. Patient complains of ongoing light headedness that is improving verbalizing \"it comes and goes\". Denies any falls or feeling faint. States she is ambulating independently w/o any DME. Noted patient speech is clear and answers questions appropriately. Denies any headaches, visual changes, difficulty swallowing, facial droop or one-sided weakness. Denies any shortness of breath, chest pain or chest discomfort. Noted MRI obtained on admission showed the followin. No acute intracranial abnormality.  No acute infarct. 2. Mild global parenchymal volume loss with mild chronic microvascular   ischemic changes. Provided a complete review of home meds with patient who confirms she obtained new meds ordered on discharge. Discussed bleeding precautions associated with ASA therapy. 1111F code entered. Discussed s/s of Stroke knowing when to seek immediate medical attention. Confirmed with patient she completed follow up with Dr. Brando Smith (neuro) yesterday and scheduled for HFU appt with PCP tomorrow. Patient states having  who provides assistance and transportation to appts. Denies any other complaints at this time. CTN will continue to follow for Care Transition.            Follow Up  Future Appointments   Date Time Provider Teetee Santizo   12/22/2021  9:45 AM Landon Washington MD [facility] Jamie Crow, APRN

## 2021-12-22 ENCOUNTER — OFFICE VISIT (OUTPATIENT)
Dept: FAMILY MEDICINE CLINIC | Age: 81
End: 2021-12-22
Payer: MEDICARE

## 2021-12-22 VITALS
DIASTOLIC BLOOD PRESSURE: 80 MMHG | RESPIRATION RATE: 20 BRPM | BODY MASS INDEX: 21.51 KG/M2 | HEART RATE: 81 BPM | WEIGHT: 126 LBS | OXYGEN SATURATION: 98 % | HEIGHT: 64 IN | TEMPERATURE: 97.8 F | SYSTOLIC BLOOD PRESSURE: 152 MMHG

## 2021-12-22 DIAGNOSIS — I63.9 ACUTE ISCHEMIC STROKE (HCC): Primary | ICD-10-CM

## 2021-12-22 LAB
EKG ATRIAL RATE: 68 BPM
EKG P AXIS: 61 DEGREES
EKG P-R INTERVAL: 244 MS
EKG Q-T INTERVAL: 368 MS
EKG QRS DURATION: 86 MS
EKG QTC CALCULATION (BAZETT): 391 MS
EKG R AXIS: 67 DEGREES
EKG T AXIS: 60 DEGREES
EKG VENTRICULAR RATE: 68 BPM

## 2021-12-22 PROCEDURE — 1111F DSCHRG MED/CURRENT MED MERGE: CPT | Performed by: FAMILY MEDICINE

## 2021-12-22 PROCEDURE — 99495 TRANSJ CARE MGMT MOD F2F 14D: CPT | Performed by: FAMILY MEDICINE

## 2021-12-22 RX ORDER — ATORVASTATIN CALCIUM 40 MG/1
40 TABLET, FILM COATED ORAL NIGHTLY
Qty: 30 TABLET | Refills: 3 | Status: SHIPPED
Start: 2021-12-22 | End: 2022-06-02

## 2021-12-22 ASSESSMENT — ENCOUNTER SYMPTOMS
VOMITING: 1
SHORTNESS OF BREATH: 0
NAUSEA: 1

## 2021-12-22 ASSESSMENT — SOCIAL DETERMINANTS OF HEALTH (SDOH): HOW HARD IS IT FOR YOU TO PAY FOR THE VERY BASICS LIKE FOOD, HOUSING, MEDICAL CARE, AND HEATING?: NOT HARD AT ALL

## 2021-12-22 NOTE — PROGRESS NOTES
Post-Discharge Transitional Care Management Services or Hospital Follow Up      Amber Aceves   YOB: 1940    Date of Office Visit:  12/22/2021  Date of Hospital Admission: 12/15/21  Date of Hospital Discharge: 12/18/21  Readmission Risk Score(high >=14%. Medium >=10%):Readmission Risk Score: 9.9 ( )      Care management risk score Rising risk (score 2-5) and Complex Care (Scores >=6): 0     Non face to face  following discharge, date last encounter closed (first attempt may have been earlier): 12/21/2021  3:05 PM 12/21/2021  3:05 PM    Call initiated 2 business days of discharge: Yes     Patient Active Problem List   Diagnosis    Primary osteoarthritis of both knees    Spine pain, cervical    Left ankle pain    Generalized anxiety disorder    Cervicalgia    Primary osteoarthritis of right shoulder    Primary insomnia    Disorder of sacrum    Chronic pain syndrome    Primary osteoarthritis of right hip    Spinal stenosis of lumbar region without neurogenic claudication    Lumbar facet arthropathy    Lumbar disc disorder    Lumbar radiculopathy    Avascular necrosis of bone of right hip ([address]    Partial symptomatic epilepsy with complex partial seizures, not intractable, without status epilepticus ([address]    Acute ischemic stroke ([address]    Acute CVA (cerebrovascular accident) ([address]       Allergies   Allergen Reactions    Penicillins Anaphylaxis and Swelling    Dextromethorphan Hives    Factive [Gemifloxacin Mesylate] Other (See Comments)     \"Unknown\"     Mucinex Dm [Dm-Guaifenesin Er] Hives    Shellfish-Derived Products Hives and Swelling     Shrimp       Medications listed as ordered at the time of discharge from hospital     Medication List          Accurate as of December 22, 2021 11:18 AM. If you have any questions, ask your nurse or doctor.             CONTINUE taking these medications    acetaminophen 325 MG tablet  Commonly known as: TYLENOL     aspirin 81 MG chewable tablet  Take 2 tablets by mouth daily     atorvastatin 40 MG tablet  Commonly known as: LIPITOR  Take 1 tablet by mouth nightly     b complex vitamins capsule     diazePAM 5 MG tablet  Commonly known as: VALIUM     lamoTRIgine 100 MG tablet  Commonly known as: LaMICtal  Take 1 tablet by mouth 3 times daily     melatonin 3 MG Tabs tablet     metoprolol tartrate 25 MG tablet  Commonly known as: LOPRESSOR  Take 1 tablet by mouth 2 times daily     Mysoline 250 MG tablet  Generic drug: primidone           Where to Get Your Medications      These medications were sent to [facility] [address]Y [address]OCK [address] [facility], [place] - [address] [facility] [phone] [facility] [phone]  [address] [facility] [address]    Phone: [phone]   · atorvastatin 40 MG tablet  · metoprolol tartrate 25 MG tablet           Medications marked \"taking\" at this time  Outpatient Medications Marked as Taking for the 12/22/21 encounter (Office Visit) with Nehal Downs MD   Medication Sig Dispense Refill    atorvastatin (LIPITOR) 40 MG tablet Take 1 tablet by mouth nightly 30 tablet 3    metoprolol tartrate (LOPRESSOR) 25 MG tablet Take 1 tablet by mouth 2 times daily 60 tablet 3    aspirin 81 MG chewable tablet Take 2 tablets by mouth daily 60 tablet 3    acetaminophen (TYLENOL) 325 MG tablet Take 650 mg by mouth every 6 hours as needed for Pain      b complex vitamins capsule Take 1 capsule by mouth daily      diazePAM (VALIUM) 5 MG tablet Take 5 mg by mouth nightly.  melatonin 3 MG TABS tablet Take 3 mg by mouth nightly       lamoTRIgine (LAMICTAL) 100 MG tablet Take 1 tablet by mouth 3 times daily 90 tablet 0    primidone (MYSOLINE) 250 MG tablet Take 250 mg by mouth 3 times daily.           Medications patient taking as of now reconciled against medications ordered at time of hospital discharge: Yes    Chief Complaint   Patient presents with   [address] from Hospital       Patient was recently admitted to [address] [place] [address],[address] for stroke-like symptoms. Patient initially had dizziness and headache, then went to hospital. 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 Discharge meds include statin, beta-blocker, and daily aspirin. Patient unable to tolerate Plavix in the past.      Inpatient course: Discharge summary reviewed- see chart. Interval history/Current status: patient still has episodes of lightheadedness and right frontal headaches. Headaches are brief. Patient has occasional blurry vision. Denies falls. Review of Systems   Eyes: Positive for visual disturbance (blurry vision). Respiratory: Negative for shortness of breath. Cardiovascular: Negative for chest pain. Gastrointestinal: Positive for nausea and vomiting. Vitals:    12/22/21 0941 12/22/21 0944   BP: (!) 162/80 (!) 152/80   Pulse: 81    Resp: 20    Temp: 97.8 °F (36.6 °C)    SpO2: 98%    Weight: 126 lb (57.2 kg)    Height: 5' 4\" (1.626 m)      Body mass index is 21.63 kg/m². Wt Readings from Last 3 Encounters:   12/22/21 126 lb (57.2 kg)   12/16/21 132 lb (59.9 kg)   10/19/21 129 lb (58.5 kg)     BP Readings from Last 3 Encounters:   12/22/21 (!) 152/80   12/18/21 130/61   10/19/21 132/74       Physical Exam  Vitals reviewed. Constitutional:       General: She is not in acute distress. Appearance: She is well-developed. Neck:      Vascular: No carotid bruit. Cardiovascular:      Rate and Rhythm: Normal rate and regular rhythm. Heart sounds: Normal heart sounds. No murmur heard. No gallop. Pulmonary:      Effort: Pulmonary effort is normal.      Breath sounds: Normal breath sounds. No wheezing or rales. Abdominal:      General: Bowel sounds are normal. There is no distension. Palpations: Abdomen is soft. Tenderness: There is no abdominal tenderness. Musculoskeletal:      Cervical back: Neck supple. Right lower leg: No edema. Left lower leg: No edema. Skin:     General: Skin is warm and dry. Neurological:      Mental Status: She is alert and oriented to person, place, and time. Betty Montana was seen today for follow-up from hospital.    Diagnoses and all orders for this visit:    Acute ischemic stroke ([facility]  -     GA DISCHARGE MEDS RECONCILED W/ CURRENT OUTPATIENT MED LIST  -     atorvastatin (LIPITOR) 40 MG tablet; Take 1 tablet by mouth nightly  -     metoprolol tartrate (LOPRESSOR) 25 MG tablet;  Take 1 tablet by mouth 2 times daily              Medical Decision Making: moderate complexity

## 2021-12-22 NOTE — PATIENT INSTRUCTIONS
Patient Education        Learning About an Ischemic Stroke  What is an ischemic stroke? An ischemic (say \"pkb-LZL-zulf\") stroke occurs when a blood clot blocks a blood vessel in the brain. This means that blood cannot flow to some part of the brain. Without blood and the oxygen it carries, this part of the brain starts to die. The part of the body controlled by the damaged area of the brain cannot work properly. This is different from a hemorrhagic (say \"ljj-yiq-WC-jick\") stroke, which happens when a blood vessel in the brain has burst open or has started to leak. The brain damage from a stroke starts within minutes. Quick treatment can help limit damage to the brain and make recovery more likely. People who have had a stroke may have a hard time talking, understanding things, and making decisions. They may have to relearn daily activities, such as how to eat, bathe, and dress. How well someone recovers from a stroke depends on how quickly the person gets to the hospital, where in the brain the stroke happened, and how severe it was. Training and therapy also make a difference in how well people recover. What are the symptoms? If you have any of these symptoms, call 911 or other emergency services right away:   · You have symptoms of a stroke. These may include:  ? Sudden numbness, tingling, weakness, or loss of movement in your face, arm, or leg, especially on only one side of your body. ? Sudden vision changes. ? Sudden trouble speaking. ? Sudden confusion or trouble understanding simple statements. ? Sudden problems with walking or balance. ? A sudden, severe headache that is different from past headaches. Call 911 if you have symptoms that seem like a stroke, even if they go away quickly. You may have had a transient ischemic attack (TIA), sometimes called a mini-stroke. A TIA is a warning that a stroke may happen soon. Getting early treatment for a TIA can help prevent a stroke.   What causes an ischemic stroke? An ischemic stroke is caused by a blood clot that blocks blood flow in the brain. The most common causes of blood clots include:  · Hardening of the arteries (atherosclerosis). This is caused by high blood pressure, diabetes, high cholesterol, or smoking. · Atrial fibrillation. How is ischemic stroke treated? Emergency treatment may be done to restore blood flow to the brain using medicine or a procedure. You may take medicines to help prevent another stroke. Ask your doctor if a stroke rehab program is right for you. How can you prevent another stroke? · Work with your doctor to treat any health problems you have. High blood pressure, high cholesterol, atrial fibrillation, and diabetes all raise your chances of having a stroke. · Be safe with medicines. Take your medicine exactly as prescribed. Call your doctor if you think you are having a problem with your medicine. · Have a healthy lifestyle. ? Do not smoke or allow others to smoke around you. If you need help quitting, talk to your doctor about stop-smoking programs and medicines. These can increase your chances of quitting for good. Smoking makes a stroke more likely. ? Limit alcohol to 2 drinks a day for men and 1 drink a day for women. ? Lose weight if you need to. A healthy weight will help you keep your heart and body healthy. ? Be active. Ask your doctor what type and level of activity is safe for you.  ? Eat heart-healthy foods, like fruits, vegetables, and high-fiber foods. Follow-up care is a key part of your treatment and safety. Be sure to make and go to all appointments, and call your doctor if you are having problems. It's also a good idea to know your test results and keep a list of the medicines you take. Where can you learn more? Go to https://hodan.[employer]. org and sign in to your [employer] account.  Enter D161 in the [employer] box to learn more about \"Learning About an Ischemic Stroke. \"     If you do not have an account, please click on the \"Sign Up Now\" link. Current as of: July 6, 2021               Content Version: 13.1  © 2153-3675 Healthwise, Incorporated. Care instructions adapted under license by [facility] ([facility]). If you have questions about a medical condition or this instruction, always ask your healthcare professional. [address] any warranty or liability for your use of this information.

## 2021-12-31 LAB
LAMOTRIGINE LEVEL: 4.7 UG/ML (ref 2.5–15)
PHENOBARBITAL LEVEL: 19.4 UG/ML (ref 15–40)
PRIMIDONE LEVEL: 12.3 UG/ML (ref 5–12)

## 2022-01-03 ENCOUNTER — TELEPHONE (OUTPATIENT)
Dept: FAMILY MEDICINE CLINIC | Age: 82
End: 2022-01-03

## 2022-01-03 ENCOUNTER — NURSE ONLY (OUTPATIENT)
Dept: FAMILY MEDICINE CLINIC | Age: 82
End: 2022-01-03

## 2022-01-03 VITALS — SYSTOLIC BLOOD PRESSURE: 132 MMHG | DIASTOLIC BLOOD PRESSURE: 78 MMHG

## 2022-01-03 DIAGNOSIS — I63.9 ACUTE ISCHEMIC STROKE (HCC): ICD-10-CM

## 2022-01-03 NOTE — TELEPHONE ENCOUNTER
----- Message from Andrés Scott sent at 1/3/2022  7:41 AM EST -----  Subject: Message to Provider    QUESTIONS  Information for Provider? Pt phnd would like lab/ma appt on 1/5 changed to   1/3 - still having problems with high bp (186/90); please call pt to   advise  ---------------------------------------------------------------------------  --------------  CALL BACK INFO  What is the best way for the office to contact you? OK to leave message on   voicemail  Preferred Call Back Phone Number? [phone]  ---------------------------------------------------------------------------  --------------  SCRIPT ANSWERS  Relationship to Patient?  Self

## 2022-01-03 NOTE — TELEPHONE ENCOUNTER
Pt was in for b/p check 132/78  Pt advised she started taking  2 metoprolol  In evening and one in am. Needs new rx to [address] [facility] with different number

## 2022-01-13 ENCOUNTER — CARE COORDINATION (OUTPATIENT)
Dept: CASE MANAGEMENT | Age: 82
End: 2022-01-13

## 2022-01-13 NOTE — CARE COORDINATION
[facility] Transitions Follow Up Call    2022    Patient: Irvin Leigh  Patient : 1940   MRN: [id number]  Reason for Admission: Acute CVA  Discharge Date: 21 RARS: Readmission Risk Score: 9.9 ( )    Care Transitions Follow Up Call    Needs to be reviewed by the provider   Additional needs identified to be addressed with provider: No  medications-Pt needs new script for Lipitor             Method of communication with provider : chart routing      Care Transition Nurse (CTN) contacted the patient by telephone to follow up after admission on 21. Verified name and  with patient as identifiers. Addressed changes since last contact: none  Discussed follow-up appointments. If no appointment was previously scheduled, appointment scheduling offered: Yes. Is follow up appointment scheduled within 7 days of discharge? Yes. Advance Care Planning:   Does patient have an Advance Directive: reviewed and current. CTN reviewed discharge instructions, medical action plan and red flags with patient and discussed any barriers to care and/or understanding of plan of care after discharge. Discussed appropriate site of care based on symptoms and resources available to patient including: PCP, Specialist, Home health and When to call 911. The patient agrees to contact the PCP office for questions related to their healthcare. Patients top risk factors for readmission: polypharmacy    Non-[facility] follow up appointment(s): CTN confirmed with patient she completed follow up with Dr. Ava Agustin (neurology) on 21     Plan for follow-up call in 7-10 days based on severity of symptoms and risk factors. Plan for next call: medication management-CTN will route message to PCP for new sript for Lipitor per patient request    Care Transitions Subsequent and Final Call    Subsequent and Final Calls  Do you have any ongoing symptoms?: Yes  Onset of Patient-reported symptoms:  In the past 7 days  Patient-reported symptoms: Other  Have your medications changed?: No  Do you have any questions related to your medications?: No  Do you currently have any active services?: Yes  Are you currently active with any services?: Home Health  Do you have any needs or concerns that I can assist you with?: Yes  Patient-reported Needs or Concerns: needs new script for Lipitor. Advised PCP ordered Lipitor on 12/22/21 and new script sent to [address] with 3 refills  Identified Barriers: Lack of Education  Care Transitions Interventions  No Identified Needs  Other Interventions:         Spoke with patient today 1/13/22 for TCM/hospital discharge follow up sub call. Patient states she had dizziness yesterday which has improved today that comes and goes. Denies any falls. Advised to refrain from sudden movements like turning of head side-to-side or leaning head too far back or forward. Patient states she continues with therapy from [facility] d/t recent left knee replacement and associates dizziness with after doing therapy. Confirmed with patient she completed follow up appt with Dr. Ladarius Nye (neurology) on 12/21/21. States BP today was 157/76 and continues taking Lopressor as directed. States sometimes having a headache that \"doesnot stay long\". Denies any one sided weakness, confusion, visual changes, facial droop or slurred speech. Denies any shortness of breath, chest pain or chest discomfort. Patient states she is drinking plenty of fluids to stay hydrated. States she had repeat lab work done and scheduled to follow up with PCP for review on 12/26/22. Patient requesting new script for Lipitor. CTN advised PCP ordered new script for Lipitor on 12/22/21 and new script sent to [facility] with 3 refills.      Follow Up  Future Appointments   Date Time Provider Teetee Santizo   1/26/2022  1:30 PM Maximino Jacobs MD [facility]       EDUARDA Liang

## 2022-01-26 ENCOUNTER — CARE COORDINATION (OUTPATIENT)
Dept: CASE MANAGEMENT | Age: 82
End: 2022-01-26

## 2022-01-26 ENCOUNTER — OFFICE VISIT (OUTPATIENT)
Dept: FAMILY MEDICINE CLINIC | Age: 82
End: 2022-01-26
Payer: MEDICARE

## 2022-01-26 VITALS
SYSTOLIC BLOOD PRESSURE: 162 MMHG | BODY MASS INDEX: 21.68 KG/M2 | OXYGEN SATURATION: 98 % | HEIGHT: 64 IN | HEART RATE: 68 BPM | WEIGHT: 127 LBS | RESPIRATION RATE: 20 BRPM | DIASTOLIC BLOOD PRESSURE: 84 MMHG

## 2022-01-26 DIAGNOSIS — I10 PRIMARY HYPERTENSION: Primary | ICD-10-CM

## 2022-01-26 DIAGNOSIS — G40.209 PARTIAL SYMPTOMATIC EPILEPSY WITH COMPLEX PARTIAL SEIZURES, NOT INTRACTABLE, WITHOUT STATUS EPILEPTICUS (HCC): ICD-10-CM

## 2022-01-26 PROBLEM — I63.9 ACUTE CVA (CEREBROVASCULAR ACCIDENT) (HCC): Status: RESOLVED | Noted: 2021-12-15 | Resolved: 2022-01-26

## 2022-01-26 PROBLEM — Z86.73 HISTORY OF STROKE: Status: ACTIVE | Noted: 2022-01-26

## 2022-01-26 PROCEDURE — 99214 OFFICE O/P EST MOD 30 MIN: CPT | Performed by: FAMILY MEDICINE

## 2022-01-26 PROCEDURE — 4040F PNEUMOC VAC/ADMIN/RCVD: CPT | Performed by: FAMILY MEDICINE

## 2022-01-26 PROCEDURE — G8399 PT W/DXA RESULTS DOCUMENT: HCPCS | Performed by: FAMILY MEDICINE

## 2022-01-26 PROCEDURE — 1036F TOBACCO NON-USER: CPT | Performed by: FAMILY MEDICINE

## 2022-01-26 PROCEDURE — G8427 DOCREV CUR MEDS BY ELIG CLIN: HCPCS | Performed by: FAMILY MEDICINE

## 2022-01-26 PROCEDURE — 1123F ACP DISCUSS/DSCN MKR DOCD: CPT | Performed by: FAMILY MEDICINE

## 2022-01-26 PROCEDURE — 1090F PRES/ABSN URINE INCON ASSESS: CPT | Performed by: FAMILY MEDICINE

## 2022-01-26 PROCEDURE — G8420 CALC BMI NORM PARAMETERS: HCPCS | Performed by: FAMILY MEDICINE

## 2022-01-26 PROCEDURE — G8484 FLU IMMUNIZE NO ADMIN: HCPCS | Performed by: FAMILY MEDICINE

## 2022-01-26 RX ORDER — METOPROLOL SUCCINATE 50 MG/1
50 TABLET, EXTENDED RELEASE ORAL DAILY
Qty: 30 TABLET | Refills: 3 | Status: SHIPPED
Start: 2022-01-26 | End: 2022-03-31 | Stop reason: SDUPTHER

## 2022-01-26 ASSESSMENT — ENCOUNTER SYMPTOMS
SHORTNESS OF BREATH: 0
DIARRHEA: 0
NAUSEA: 0
VOMITING: 0

## 2022-01-26 ASSESSMENT — PATIENT HEALTH QUESTIONNAIRE - PHQ9
2. FEELING DOWN, DEPRESSED OR HOPELESS: 1
1. LITTLE INTEREST OR PLEASURE IN DOING THINGS: 0
SUM OF ALL RESPONSES TO PHQ QUESTIONS 1-9: 1
SUM OF ALL RESPONSES TO PHQ9 QUESTIONS 1 & 2: 1
SUM OF ALL RESPONSES TO PHQ QUESTIONS 1-9: 1

## 2022-01-26 NOTE — PATIENT INSTRUCTIONS
Patient Education        High Blood Pressure: Care Instructions  Overview     It's normal for blood pressure to go up and down throughout the day. But if it stays up, you have high blood pressure. Another name for high blood pressure is hypertension. Despite what a lot of people think, high blood pressure usually doesn't cause headaches or make you feel dizzy or lightheaded. It usually has no symptoms. But it does increase your risk of stroke, heart attack, and other problems. You and your doctor will talk about your risks of these problems based on your blood pressure. Your doctor will give you a goal for your blood pressure. Your goal will be based on your health and your age. Lifestyle changes, such as eating healthy and being active, are always important to help lower blood pressure. You might also take medicine to reach your blood pressure goal.  Follow-up care is a key part of your treatment and safety. Be sure to make and go to all appointments, and call your doctor if you are having problems. It's also a good idea to know your test results and keep a list of the medicines you take. How can you care for yourself at home? Medical treatment  · If you stop taking your medicine, your blood pressure will go back up. You may take one or more types of medicine to lower your blood pressure. Be safe with medicines. Take your medicine exactly as prescribed. Call your doctor if you think you are having a problem with your medicine. · Talk to your doctor before you start taking aspirin every day. Aspirin can help certain people lower their risk of a heart attack or stroke. But taking aspirin isn't right for everyone, because it can cause serious bleeding. · See your doctor regularly. You may need to see the doctor more often at first or until your blood pressure comes down.   · If you are taking blood pressure medicine, talk to your doctor before you take decongestants or anti-inflammatory medicine, such as irregular heartbeat.     · You have symptoms of a stroke. These may include:  ? Sudden numbness, tingling, weakness, or loss of movement in your face, arm, or leg, especially on only one side of your body. ? Sudden vision changes. ? Sudden trouble speaking. ? Sudden confusion or trouble understanding simple statements. ? Sudden problems with walking or balance. ? A sudden, severe headache that is different from past headaches.     · You have severe back or belly pain. Do not wait until your blood pressure comes down on its own. Get help right away. Call your doctor now or seek immediate care if:    · Your blood pressure is much higher than normal (such as 180/120 or higher), but you don't have symptoms.     · You think high blood pressure is causing symptoms, such as:  ? Severe headache.  ? Blurry vision. Watch closely for changes in your health, and be sure to contact your doctor if:    · Your blood pressure measures higher than your doctor recommends at least 2 times. That means the top number is higher or the bottom number is higher, or both.     · You think you may be having side effects from your blood pressure medicine. Where can you learn more? Go to https://Entelliumpepiceweb.[employer]. org and sign in to your [employer] account. Enter K200 in the [employer] box to learn more about \"High Blood Pressure: Care Instructions. \"     If you do not have an account, please click on the \"Sign Up Now\" link. Current as of: April 29, 2021               Content Version: 13.1  © 2006-2021 Healthwise, Incorporated. Care instructions adapted under license by [facility] ([facility]). If you have questions about a medical condition or this instruction, always ask your healthcare professional. [place] [address] any warranty or liability for your use of this information.

## 2022-01-26 NOTE — CARE COORDINATION
[facility] Transitions Follow Up Call    2022    Patient: Stephen Calloway  Patient : 1940   MRN: [id number]  Reason for Admission: Acute CVA  Discharge Date: 21 RARS: Readmission Risk Score: 9.9 ( )         Spoke with: Mona 23 Transitions Subsequent and Final Call    Subsequent and Final Calls  Do you have any ongoing symptoms?: No  Have your medications changed?: No  Do you have any questions related to your medications?: No  Do you currently have any active services?: No  Are you currently active with any services?: Home Health  Do you have any needs or concerns that I can assist you with?: No  Identified Barriers: None  Care Transitions Interventions  No Identified Needs  Other Interventions:         Spoke with patient today 22 for TCM/hospital discharge final follow up call. Patient states dizziness has subsided since last CTN call. States BP has been fluctuating and keeping log of readings to show PCP. States BP today was 186/95. States she has switched to a low sodium diet d/t BP. Denies any headaches, confusion, changes in vision, one sided weakness, facial droop or slurred speech. Denies any difficulty ambulating but admits she now has [address] coming in for therapy which is going well. Confirmed with patient she is scheduled to follow up with Dr. Sharmin Samuel (PCP) today at 1:30 pm. Denies any needs at this time. CTN signing off.      Follow Up  Future Appointments   Date Time Provider Teetee Santizo   2022  1:30 PM Meredith Hernández MD [facility]       EDUARDA Lockett

## 2022-01-26 NOTE — PROGRESS NOTES
Chief Complaint   Patient presents with    Hypertension    Headache     that last 10 seconds  not all time x awhile        Patient is here for follow up for hypertension    Hypertension:  Patient is here for follow up chronic hypertension. This is  generally controlled on current medication regimen. BP today is 162/84. Home blood pressure readings vary from 598-865 systolic. Takes meds as directed and tolerates them well. Most recent labs reviewed with patient, including CMP, CBC, and TSH, and are not remarkable. No symptoms from htn standpoint per ROS. Patientis  compliant with lifestyle modifications. Patient does not smoke. Comorbid conditions include seizure disorder. Patient still has left upper arm pain that comes and goes. Started several months ago. Patient doing well on current regimen for epilepsy. Sees neurology. No recent seizure activity. Patient's past medical, surgical, social and/or family history reviewed, updated inchart, and are non-contributory (unless otherwise stated). Medications and allergies also reviewed and updated in chart. Current Outpatient Medications   Medication Sig Dispense Refill    metoprolol succinate (TOPROL XL) 50 MG extended release tablet Take 1 tablet by mouth daily 30 tablet 3    atorvastatin (LIPITOR) 40 MG tablet Take 1 tablet by mouth nightly 30 tablet 3    aspirin 81 MG chewable tablet Take 2 tablets by mouth daily 60 tablet 3    acetaminophen (TYLENOL) 325 MG tablet Take 650 mg by mouth every 6 hours as needed for Pain      b complex vitamins capsule Take 1 capsule by mouth daily      diazePAM (VALIUM) 5 MG tablet Take 5 mg by mouth nightly.  melatonin 3 MG TABS tablet Take 3 mg by mouth nightly       lamoTRIgine (LAMICTAL) 100 MG tablet Take 1 tablet by mouth 3 times daily 90 tablet 0    primidone (MYSOLINE) 250 MG tablet Take 250 mg by mouth 3 times daily. No current facility-administered medications for this visit. Wt Readings from Last 3 Encounters:   01/26/22 127 lb (57.6 kg)   12/22/21 126 lb (57.2 kg)   12/16/21 132 lb (59.9 kg)     BP (!) 162/84   Pulse 68   Resp 20   Ht 5' 4\" (1.626 m)   Wt 127 lb (57.6 kg)   SpO2 98%   BMI 21.80 kg/m²     Review of Systems   Eyes: Positive for visual disturbance (blurry vision rarely). Respiratory: Negative for shortness of breath. Cardiovascular: Negative for chest pain, palpitations and leg swelling. Gastrointestinal: Negative for diarrhea, nausea and vomiting. Genitourinary: Negative for difficulty urinating, dysuria and frequency. Skin: Negative for rash. Neurological: Positive for headaches (Brief temporal). Physical Exam  Vitals reviewed. Constitutional:       General: She is not in acute distress. Appearance: She is well-developed. Neck:      Vascular: No carotid bruit. Cardiovascular:      Rate and Rhythm: Normal rate and regular rhythm. Heart sounds: Normal heart sounds. No murmur heard. No gallop. Pulmonary:      Effort: Pulmonary effort is normal.      Breath sounds: Normal breath sounds. No wheezing or rales. Abdominal:      General: Bowel sounds are normal. There is no distension. Palpations: Abdomen is soft. Tenderness: There is no abdominal tenderness. Musculoskeletal:      Cervical back: Neck supple. Right lower leg: No edema. Left lower leg: No edema. Skin:     General: Skin is warm and dry. Neurological:      Mental Status: She is alert and oriented to person, place, and time. Hortencia Gotti was seen today for hypertension and headache. Diagnoses and all orders for this visit:    Primary hypertension  -     Start metoprolol succinate (TOPROL XL) 50 MG extended release tablet;  Take 1 tablet by mouth daily        -     Discontinue Lopressor    Partial symptomatic epilepsy with complex partial seizures, not intractable, without status epilepticus ([facility]        -     Stable; will assess yearly              Phone/MyChart follow up iftests abnormal.    Return in about 1 month (around 2/26/2022) for hypertension. , or sooner if necessary. Educational materials and/or home exercises printed for patient's review and wereincluded in patient instructions on his/her After Visit Summary and given to patient at the end of visit. Counseled regarding above diagnosis, including possible risks andcomplications,  especially if left uncontrolled. Counseled regarding the possible side effects, risks, benefits and alternatives to treatment; patient and/or guardian verbalizes understanding, agrees, feelscomfortable with and wishes to proceed with above treatment plan. Advised patient to call with any new medication issues, and read all Rx info from pharmacy to assure aware of all possible risks and side effects ofmedication before taking. Reviewed age and gender appropriate health screening exams and vaccinations. Advised patient regarding importance of keeping up with recommended health maintenance and to schedule as soonas possible if overdue, as this is important in assessing for undiagnosed pathology, especially cancer, as well as protecting against potentially harmful/life threatening disease. Patient and/or guardianverbalizes understanding and agrees with above counseling, assessment and plan. All questions answered.

## 2022-02-22 ENCOUNTER — OFFICE VISIT (OUTPATIENT)
Dept: FAMILY MEDICINE CLINIC | Age: 82
End: 2022-02-22
Payer: MEDICARE

## 2022-02-22 VITALS
SYSTOLIC BLOOD PRESSURE: 164 MMHG | TEMPERATURE: 97 F | OXYGEN SATURATION: 97 % | BODY MASS INDEX: 21.68 KG/M2 | RESPIRATION RATE: 18 BRPM | WEIGHT: 127 LBS | HEART RATE: 105 BPM | DIASTOLIC BLOOD PRESSURE: 70 MMHG | HEIGHT: 64 IN

## 2022-02-22 DIAGNOSIS — R23.3 PETECHIAL RASH: ICD-10-CM

## 2022-02-22 DIAGNOSIS — I10 PRIMARY HYPERTENSION: ICD-10-CM

## 2022-02-22 DIAGNOSIS — R60.1 GENERALIZED EDEMA: Primary | ICD-10-CM

## 2022-02-22 PROCEDURE — G8420 CALC BMI NORM PARAMETERS: HCPCS | Performed by: NURSE PRACTITIONER

## 2022-02-22 PROCEDURE — 99213 OFFICE O/P EST LOW 20 MIN: CPT | Performed by: NURSE PRACTITIONER

## 2022-02-22 PROCEDURE — 1123F ACP DISCUSS/DSCN MKR DOCD: CPT | Performed by: NURSE PRACTITIONER

## 2022-02-22 PROCEDURE — 1036F TOBACCO NON-USER: CPT | Performed by: NURSE PRACTITIONER

## 2022-02-22 PROCEDURE — G8399 PT W/DXA RESULTS DOCUMENT: HCPCS | Performed by: NURSE PRACTITIONER

## 2022-02-22 PROCEDURE — 1090F PRES/ABSN URINE INCON ASSESS: CPT | Performed by: NURSE PRACTITIONER

## 2022-02-22 PROCEDURE — G8484 FLU IMMUNIZE NO ADMIN: HCPCS | Performed by: NURSE PRACTITIONER

## 2022-02-22 PROCEDURE — 4040F PNEUMOC VAC/ADMIN/RCVD: CPT | Performed by: NURSE PRACTITIONER

## 2022-02-22 PROCEDURE — G8427 DOCREV CUR MEDS BY ELIG CLIN: HCPCS | Performed by: NURSE PRACTITIONER

## 2022-02-22 RX ORDER — DIPHENHYDRAMINE HCL 25 MG
25 TABLET ORAL NIGHTLY PRN
Qty: 30 TABLET | Refills: 0 | Status: CANCELLED | OUTPATIENT
Start: 2022-02-22 | End: 2022-03-24

## 2022-02-22 RX ORDER — PREDNISONE 10 MG/1
10 TABLET ORAL 2 TIMES DAILY
Qty: 6 TABLET | Refills: 0 | Status: SHIPPED | OUTPATIENT
Start: 2022-02-22 | End: 2022-02-25

## 2022-02-22 RX ORDER — ACYCLOVIR 200 MG/1
200 CAPSULE ORAL 2 TIMES DAILY
Qty: 6 CAPSULE | Refills: 0 | Status: SHIPPED | OUTPATIENT
Start: 2022-02-22 | End: 2022-02-25

## 2022-02-22 NOTE — PROGRESS NOTES
Chief Complaint:   Urticaria (for the last week. She was recently started on Atorvastatin and had her Metoprolol increased. Today a large hive showed up under her rigfht eye)    History of Present Illness   Source of history provided by:  Patient & . Marianna Betancourt is a [de-identified] y.o. old female who presents to walk-in for complaints of sudden onset, gradual onset generalized hives after unknown exposure to which she believes is a result in change of her medication which began 5 day(s) prior to arrival.  She has continued to take her medication as prescribed. Since onset the symptoms have been persistent and mild in severity, today she noticed swelling under her eye and relieved by medication: increased BP medication & Atorvastatin. The patient has a  history of allergies to medication and no similar reactions in the past.  She denies breathing difficulty, throat swelling, difficulty swallowing, wheezing or additional symptoms. Has been taking benadryl OTC without relief. Reports pain prior to eye on right side of neck & back, unable to wear bra. Denies vesicles or change in vision. ROS   Unless otherwise stated in this report or unable to obtain because of the patient's clinical or mental status as evidenced by the medical record, this patients's positive and negative responses for Review of Systems, constitutional, psych, eyes, ENT, cardiovascular, respiratory, gastrointestinal, neurological, genitourinary, musculoskeletal, integument systems and systems related to the presenting problem are either stated in the preceding or were not pertinent or were negative for the symptoms and/or complaints related to the medical problem. Past Medical History:  has a past medical history of Diverticulosis, Hemorrhoids, internal, Irritable bowel syndrome, Osteoarthritis, Osteoporosis, Seizures ([address] and Urticaria, chronic.    Past Surgical History:  has a past surgical history that includes Knee arthroscopy; Cholecystectomy; Tonsillectomy and adenoidectomy; Hysterectomy; Appendectomy; Colonoscopy; Carpal tunnel release (Left); Finger trigger release (Left); hernia repair (Right, 10/31/2014); Nerve Block (Right, 09/19/2019); Injection Procedure For Sacroiliac Joint (Right, 9/19/2019); Nerve Block (Right, 10/08/2019); hc inject other perphrl nerv (Right, 10/8/2019); and Total hip arthroplasty (Right, 01/2020). Social History:  reports that she has never smoked. She has never used smokeless tobacco. She reports that she does not drink alcohol and does not use drugs. Family History: family history includes Cancer in her brother; Coronary Art Dis in her brother, father, and mother; Diabetes in her maternal aunt. Allergies: Penicillins, Dextromethorphan, Factive [gemifloxacin mesylate], Mucinex dm [dm-guaifenesin er], and Shellfish-derived products    Physical Exam   Vital Signs:  BP (!) 164/70   Pulse 105   Temp 97 °F (36.1 °C) (Temporal)   Resp 18   Ht 5' 4\" (1.626 m)   Wt 127 lb (57.6 kg)   SpO2 97%   BMI 21.80 kg/m²    Oxygen Saturation Interpretation: Normal.    General Appearance/Constitutional:  Alert, development consistent with age. HEENT:  NC/NT. PERRLA. Airway patent. Edema noted right maxillary tenderness. Neck:  Normal ROM. Supple. Respiratory:  Clear to auscultation and breath sounds equal.  CV:  Regular rate and rhythm, normal heart sounds, without pathological murmurs, ectopy, gallops, or rubs. GI:  Abdomen Soft, nontender, good bowel sounds. No firm or pulsatile mass. Back:  No costovertebral tenderness. Extremities: No tenderness or edema noted. Integument:  Normal turgor. Warm, excessively dry, without visible rash, unless noted elsewhere. See photo. Lymphatics: No lymphangitis or adenopathy noted. Neurological:  Oriented. Motor functions intact.            Test Results Section   (All laboratory and radiology results have been personally reviewed by myself)  Labs:  No results found for this visit on 02/22/22. Imaging: All Radiology results interpreted by Radiologist unless otherwise noted. No results found. Assessment / Plan   Impression(s):  Jim Portillo was seen today for urticaria. Diagnoses and all orders for this visit:    Generalized edema  -     predniSONE (DELTASONE) 10 MG tablet; Take 1 tablet by mouth 2 times daily for 3 days  -     acyclovir (ZOVIRAX) 200 MG capsule; Take 1 capsule by mouth 2 times daily for 3 days  - R/o shingles based upon patient ROS    Petechial rash/Primary hypertension  - F/u with PCP regarding BP medication and any changes    Script written, side effects discussed. Avoid scratching to prevent the development of a secondary infection. F/u PCP in 3-5 days if symptoms persist. ED sooner if symptoms worsen or change. ED immediately with any fever, dyspnea, dysphagia, CP, or signs of secondary infection which were discussed. Pt is in agreement with this care plan. All questions answered. No follow-ups on file. Electronically signed by EDUARDA Salcido CNP   DD: 2/22/22    **This report was transcribed using voice recognition software. Every effort was made to ensure accuracy; however, inadvertent computerized transcription errors may be present.

## 2022-03-01 ENCOUNTER — OFFICE VISIT (OUTPATIENT)
Dept: FAMILY MEDICINE CLINIC | Age: 82
End: 2022-03-01
Payer: MEDICARE

## 2022-03-01 VITALS
SYSTOLIC BLOOD PRESSURE: 160 MMHG | HEART RATE: 76 BPM | WEIGHT: 126 LBS | RESPIRATION RATE: 20 BRPM | OXYGEN SATURATION: 99 % | DIASTOLIC BLOOD PRESSURE: 80 MMHG | BODY MASS INDEX: 21.51 KG/M2 | HEIGHT: 64 IN

## 2022-03-01 DIAGNOSIS — I10 PRIMARY HYPERTENSION: Primary | ICD-10-CM

## 2022-03-01 PROCEDURE — G8484 FLU IMMUNIZE NO ADMIN: HCPCS | Performed by: FAMILY MEDICINE

## 2022-03-01 PROCEDURE — G8427 DOCREV CUR MEDS BY ELIG CLIN: HCPCS | Performed by: FAMILY MEDICINE

## 2022-03-01 PROCEDURE — G8420 CALC BMI NORM PARAMETERS: HCPCS | Performed by: FAMILY MEDICINE

## 2022-03-01 PROCEDURE — G8399 PT W/DXA RESULTS DOCUMENT: HCPCS | Performed by: FAMILY MEDICINE

## 2022-03-01 PROCEDURE — 1123F ACP DISCUSS/DSCN MKR DOCD: CPT | Performed by: FAMILY MEDICINE

## 2022-03-01 PROCEDURE — 99214 OFFICE O/P EST MOD 30 MIN: CPT | Performed by: FAMILY MEDICINE

## 2022-03-01 PROCEDURE — 1036F TOBACCO NON-USER: CPT | Performed by: FAMILY MEDICINE

## 2022-03-01 PROCEDURE — 1090F PRES/ABSN URINE INCON ASSESS: CPT | Performed by: FAMILY MEDICINE

## 2022-03-01 PROCEDURE — 4040F PNEUMOC VAC/ADMIN/RCVD: CPT | Performed by: FAMILY MEDICINE

## 2022-03-01 RX ORDER — CLOBETASOL PROPIONATE 0.5 MG/G
CREAM TOPICAL
COMMUNITY
Start: 2022-02-24

## 2022-03-01 RX ORDER — LISINOPRIL 10 MG/1
10 TABLET ORAL DAILY
Qty: 30 TABLET | Refills: 3 | Status: SHIPPED
Start: 2022-03-01 | End: 2022-03-02 | Stop reason: SINTOL

## 2022-03-01 ASSESSMENT — ENCOUNTER SYMPTOMS
NAUSEA: 0
VOMITING: 0
DIARRHEA: 0
SHORTNESS OF BREATH: 0

## 2022-03-01 NOTE — PATIENT INSTRUCTIONS

## 2022-03-01 NOTE — PROGRESS NOTES
Chief Complaint   Patient presents with    Hypertension       Patient is here for follow up for hypertension    Hypertension:  Patient is here for follow up chronic hypertension. This is  generally controlled on current medication regimen. BP today is 160/80. Home blood pressure readings range from 150's-170's/70's-80's takes meds as directed and tolerates them well. Most recent labs reviewed with patient, including CMP, TSH and CBC with differential, and are not remarkable. No symptoms from htn standpoint per ROS. Patientis  compliant with lifestyle modifications. Patient does not smoke. Comorbid conditions include seizure disorder. Patient's past medical, surgical, social and/or family history reviewed, updated inchart, and are non-contributory (unless otherwise stated). Medications and allergies also reviewed and updated in chart. Current Outpatient Medications   Medication Sig Dispense Refill    clobetasol (TEMOVATE) 0.05 % cream APPLY CREAM TOPICALLY TWICE DAILY AS NEEDED FOR FLARES TO BODY      lisinopril (PRINIVIL;ZESTRIL) 10 MG tablet Take 1 tablet by mouth daily 30 tablet 3    metoprolol succinate (TOPROL XL) 50 MG extended release tablet Take 1 tablet by mouth daily 30 tablet 3    atorvastatin (LIPITOR) 40 MG tablet Take 1 tablet by mouth nightly 30 tablet 3    aspirin 81 MG chewable tablet Take 2 tablets by mouth daily 60 tablet 3    acetaminophen (TYLENOL) 325 MG tablet Take 650 mg by mouth every 6 hours as needed for Pain      b complex vitamins capsule Take 1 capsule by mouth daily      diazePAM (VALIUM) 5 MG tablet Take 5 mg by mouth nightly.  melatonin 3 MG TABS tablet Take 3 mg by mouth nightly       lamoTRIgine (LAMICTAL) 100 MG tablet Take 1 tablet by mouth 3 times daily 90 tablet 0    primidone (MYSOLINE) 250 MG tablet Take 250 mg by mouth 3 times daily. No current facility-administered medications for this visit.        Wt Readings from Last 3 Encounters: 03/01/22 126 lb (57.2 kg)   02/22/22 127 lb (57.6 kg)   01/26/22 127 lb (57.6 kg)     BP (!) 160/80   Pulse 76   Resp 20   Ht 5' 4\" (1.626 m)   Wt 126 lb (57.2 kg)   SpO2 99%   BMI 21.63 kg/m²     Review of Systems   Eyes: Positive for visual disturbance (blurry vision comes and goes). Respiratory: Negative for shortness of breath. Cardiovascular: Negative for chest pain, palpitations and leg swelling. Gastrointestinal: Negative for diarrhea, nausea and vomiting. Genitourinary: Negative for difficulty urinating, dysuria and frequency. Skin: Positive for rash. Physical Exam  Vitals reviewed. Constitutional:       General: She is not in acute distress. Appearance: She is well-developed. Neck:      Vascular: No carotid bruit. Cardiovascular:      Rate and Rhythm: Normal rate and regular rhythm. Heart sounds: Normal heart sounds. No murmur heard. No gallop. Pulmonary:      Effort: Pulmonary effort is normal.      Breath sounds: Normal breath sounds. No wheezing or rales. Abdominal:      General: Bowel sounds are normal. There is no distension. Palpations: Abdomen is soft. Tenderness: There is no abdominal tenderness. Musculoskeletal:      Cervical back: Neck supple. Right lower leg: No edema. Left lower leg: No edema. Skin:     General: Skin is warm and dry. Neurological:      Mental Status: She is alert and oriented to person, place, and time. Yoana Perez was seen today for hypertension. Diagnoses and all orders for this visit:    Primary hypertension  -     Start lisinopril (PRINIVIL;ZESTRIL) 10 MG tablet; Take 1 tablet by mouth daily  -     Comprehensive Metabolic Panel; Future        -     Metoprolol 50 mg daily for management          Phone/MyChart follow up iftests abnormal.    Return in about 1 month (around 4/1/2022) for hypertension. , or sooner if necessary.               Educational materials and/or home exercises printed for patient's review and wereincluded in patient instructions on his/her After Visit Summary and given to patient at the end of visit. Counseled regarding above diagnosis, including possible risks andcomplications,  especially if left uncontrolled. Counseled regarding the possible side effects, risks, benefits and alternatives to treatment; patient and/or guardian verbalizes understanding, agrees, feelscomfortable with and wishes to proceed with above treatment plan. Advised patient to call with any new medication issues, and read all Rx info from pharmacy to assure aware of all possible risks and side effects ofmedication before taking. Reviewed age and gender appropriate health screening exams and vaccinations. Advised patient regarding importance of keeping up with recommended health maintenance and to schedule as soonas possible if overdue, as this is important in assessing for undiagnosed pathology, especially cancer, as well as protecting against potentially harmful/life threatening disease. Patient and/or guardianverbalizes understanding and agrees with above counseling, assessment and plan. All questions answered.

## 2022-03-02 ENCOUNTER — TELEPHONE (OUTPATIENT)
Dept: FAMILY MEDICINE CLINIC | Age: 82
End: 2022-03-02

## 2022-03-02 RX ORDER — HYDROCHLOROTHIAZIDE 25 MG/1
25 TABLET ORAL EVERY MORNING
Qty: 30 TABLET | Refills: 1 | Status: SHIPPED
Start: 2022-03-02 | End: 2022-03-31 | Stop reason: SDUPTHER

## 2022-03-02 NOTE — TELEPHONE ENCOUNTER
Patient called c/o breaking out in hives last night approx 3 hours after taking Lisinopril. Patient c/o redness from breast down, back, and legs. Arms are red. Patient c/o hives in her throat and under her eyes. Patient stated hives in throat are not bad. I spoke w/Dr. Marlena Powell and was instructed to advise patient to discontinue Lisinopril and ask if patient is having any difficulty breathing or swallowing. Patient denied any trouble swallowing or breathing. Advised patient to stop taking Lisinopril and informed that Dr. Marlena Powell will call new RX into pharmacy.      Last seen 3/1/2022  Next appt 3/31/2022  [facility]

## 2022-03-03 NOTE — TELEPHONE ENCOUNTER
Prescription for HCTZ 25 mg sent to pharmacy. Lisinopril removed from medication list and added to allergies.

## 2022-03-31 ENCOUNTER — OFFICE VISIT (OUTPATIENT)
Dept: FAMILY MEDICINE CLINIC | Age: 82
End: 2022-03-31
Payer: MEDICARE

## 2022-03-31 VITALS
HEART RATE: 67 BPM | RESPIRATION RATE: 20 BRPM | HEIGHT: 64 IN | WEIGHT: 126 LBS | OXYGEN SATURATION: 98 % | SYSTOLIC BLOOD PRESSURE: 142 MMHG | DIASTOLIC BLOOD PRESSURE: 68 MMHG | BODY MASS INDEX: 21.51 KG/M2

## 2022-03-31 DIAGNOSIS — Z12.31 ENCOUNTER FOR SCREENING MAMMOGRAM FOR MALIGNANT NEOPLASM OF BREAST: ICD-10-CM

## 2022-03-31 DIAGNOSIS — I10 PRIMARY HYPERTENSION: Primary | ICD-10-CM

## 2022-03-31 PROCEDURE — 1123F ACP DISCUSS/DSCN MKR DOCD: CPT | Performed by: FAMILY MEDICINE

## 2022-03-31 PROCEDURE — 1036F TOBACCO NON-USER: CPT | Performed by: FAMILY MEDICINE

## 2022-03-31 PROCEDURE — G8484 FLU IMMUNIZE NO ADMIN: HCPCS | Performed by: FAMILY MEDICINE

## 2022-03-31 PROCEDURE — 99214 OFFICE O/P EST MOD 30 MIN: CPT | Performed by: FAMILY MEDICINE

## 2022-03-31 PROCEDURE — 4040F PNEUMOC VAC/ADMIN/RCVD: CPT | Performed by: FAMILY MEDICINE

## 2022-03-31 PROCEDURE — G8427 DOCREV CUR MEDS BY ELIG CLIN: HCPCS | Performed by: FAMILY MEDICINE

## 2022-03-31 PROCEDURE — 1090F PRES/ABSN URINE INCON ASSESS: CPT | Performed by: FAMILY MEDICINE

## 2022-03-31 PROCEDURE — G8420 CALC BMI NORM PARAMETERS: HCPCS | Performed by: FAMILY MEDICINE

## 2022-03-31 PROCEDURE — G8399 PT W/DXA RESULTS DOCUMENT: HCPCS | Performed by: FAMILY MEDICINE

## 2022-03-31 RX ORDER — METOPROLOL SUCCINATE 50 MG/1
50 TABLET, EXTENDED RELEASE ORAL DAILY
Qty: 30 TABLET | Refills: 5 | Status: SHIPPED
Start: 2022-03-31 | End: 2022-10-11 | Stop reason: DRUGHIGH

## 2022-03-31 RX ORDER — HYDROCHLOROTHIAZIDE 25 MG/1
25 TABLET ORAL EVERY MORNING
Qty: 30 TABLET | Refills: 5 | Status: SHIPPED
Start: 2022-03-31 | End: 2022-08-19

## 2022-03-31 RX ORDER — LISINOPRIL 10 MG/1
TABLET ORAL
COMMUNITY
Start: 2022-03-30 | End: 2022-03-31

## 2022-03-31 ASSESSMENT — ENCOUNTER SYMPTOMS
VOMITING: 0
DIARRHEA: 0
NAUSEA: 0
SHORTNESS OF BREATH: 0

## 2022-03-31 NOTE — PROGRESS NOTES
Chief Complaint   Patient presents with    Hypertension       Patient is here for follow up for hypertension    Hypertension:  Patient is here for follow up chronic hypertension. This is  generally controlled on current medication regimen. BP today is 142/68. Takes meds as directed and tolerates them well. No symptoms from htn standpoint per ROS. Patientis  compliant with lifestyle modifications. Patient does not smoke. Comorbid conditions include cerebrovascular disease. Patient's past medical, surgical, social and/or family history reviewed, updated inchart, and are non-contributory (unless otherwise stated). Medications and allergies also reviewed and updated in chart. Current Outpatient Medications   Medication Sig Dispense Refill    hydroCHLOROthiazide (HYDRODIURIL) 25 MG tablet Take 1 tablet by mouth every morning 30 tablet 5    metoprolol succinate (TOPROL XL) 50 MG extended release tablet Take 1 tablet by mouth daily 30 tablet 5    clobetasol (TEMOVATE) 0.05 % cream APPLY CREAM TOPICALLY TWICE DAILY AS NEEDED FOR FLARES TO BODY      atorvastatin (LIPITOR) 40 MG tablet Take 1 tablet by mouth nightly 30 tablet 3    aspirin 81 MG chewable tablet Take 2 tablets by mouth daily 60 tablet 3    acetaminophen (TYLENOL) 325 MG tablet Take 650 mg by mouth every 6 hours as needed for Pain      b complex vitamins capsule Take 1 capsule by mouth daily      diazePAM (VALIUM) 5 MG tablet Take 5 mg by mouth nightly.  melatonin 3 MG TABS tablet Take 3 mg by mouth nightly       lamoTRIgine (LAMICTAL) 100 MG tablet Take 1 tablet by mouth 3 times daily 90 tablet 0    primidone (MYSOLINE) 250 MG tablet Take 250 mg by mouth 3 times daily. No current facility-administered medications for this visit.        Wt Readings from Last 3 Encounters:   03/31/22 126 lb (57.2 kg)   03/01/22 126 lb (57.2 kg)   02/22/22 127 lb (57.6 kg)     BP (!) 142/68   Pulse 67   Resp 20   Ht 5' 4\" (1.626 m)   Wt 126 lb (57.2 kg)   SpO2 98%   BMI 21.63 kg/m²     Review of Systems   Eyes: Negative for visual disturbance. Respiratory: Negative for shortness of breath. Cardiovascular: Negative for chest pain, palpitations and leg swelling. Gastrointestinal: Negative for diarrhea, nausea and vomiting. Genitourinary: Negative for difficulty urinating, dysuria and frequency. Skin: Negative for rash. Neurological: Positive for dizziness and headaches (seldom). Psychiatric/Behavioral: Negative for dysphoric mood. Physical Exam  Vitals reviewed. Constitutional:       General: She is not in acute distress. Appearance: She is well-developed. Neck:      Vascular: No carotid bruit. Cardiovascular:      Rate and Rhythm: Normal rate and regular rhythm. Heart sounds: Normal heart sounds. No murmur heard. No gallop. Pulmonary:      Effort: Pulmonary effort is normal.      Breath sounds: Normal breath sounds. No wheezing or rales. Abdominal:      General: Bowel sounds are normal. There is no distension. Palpations: Abdomen is soft. Tenderness: There is no abdominal tenderness. Musculoskeletal:      Cervical back: Neck supple. Right lower leg: No edema. Left lower leg: No edema. Skin:     General: Skin is warm and dry. Neurological:      Mental Status: She is alert and oriented to person, place, and time. Tre Little was seen today for hypertension. Diagnoses and all orders for this visit:    Primary hypertension  -     Lipid Panel; Future  -     TSH; Future  -     hydroCHLOROthiazide (HYDRODIURIL) 25 MG tablet; Take 1 tablet by mouth every morning  -     metoprolol succinate (TOPROL XL) 50 MG extended release tablet; Take 1 tablet by mouth daily    Encounter for screening mammogram for malignant neoplasm of breast  -     [facility] [place] [facility];  Future            Phone/MyChart follow up iftests abnormal.    Return in about 3 months (around 6/30/2022) for Annual Medicare Wellness Visit--30 minutes. , or sooner if necessary. Educational materials and/or home exercises printed for patient's review and wereincluded in patient instructions on his/her After Visit Summary and given to patient at the end of visit. Counseled regarding above diagnosis, including possible risks andcomplications,  especially if left uncontrolled. Counseled regarding the possible side effects, risks, benefits and alternatives to treatment; patient and/or guardian verbalizes understanding, agrees, feelscomfortable with and wishes to proceed with above treatment plan. Advised patient to call with any new medication issues, and read all Rx info from pharmacy to assure aware of all possible risks and side effects ofmedication before taking. Reviewed age and gender appropriate health screening exams and vaccinations. Advised patient regarding importance of keeping up with recommended health maintenance and to schedule as soonas possible if overdue, as this is important in assessing for undiagnosed pathology, especially cancer, as well as protecting against potentially harmful/life threatening disease. Patient and/or guardianverbalizes understanding and agrees with above counseling, assessment and plan. All questions answered.

## 2022-03-31 NOTE — PATIENT INSTRUCTIONS
Patient Education        High Blood Pressure: Care Instructions  Overview     It's normal for blood pressure to go up and down throughout the day. But if it stays up, you have high blood pressure. Another name for high blood pressure ishypertension. Despite what a lot of people think, high blood pressure usually doesn't cause headaches or make you feel dizzy or lightheaded. It usually has no symptoms. But it does increase your risk of stroke, heart attack, and other problems. You and your doctor will talk about your risks of these problems based on yourblood pressure. Your doctor will give you a goal for your blood pressure. Your goal will bebased on your health and your age. Lifestyle changes, such as eating healthy and being active, are always important to help lower blood pressure. You might also take medicine to reachyour blood pressure goal.  Follow-up care is a key part of your treatment and safety. Be sure to make and go to all appointments, and call your doctor if you are having problems. It's also a good idea to know your test results and keep alist of the medicines you take. How can you care for yourself at home? Medical treatment   If you stop taking your medicine, your blood pressure will go back up. You may take one or more types of medicine to lower your blood pressure. Be safe with medicines. Take your medicine exactly as prescribed. Call your doctor if you think you are having a problem with your medicine.  Talk to your doctor before you start taking aspirin every day. Aspirin can help certain people lower their risk of a heart attack or stroke. But taking aspirin isn't right for everyone, because it can cause serious bleeding.  See your doctor regularly. You may need to see the doctor more often at first or until your blood pressure comes down.  If you are taking blood pressure medicine, talk to your doctor before you take decongestants or anti-inflammatory medicine, such as ibuprofen. Some of these medicines can raise blood pressure.  Learn how to check your blood pressure at home. Lifestyle changes   Stay at a healthy weight. This is especially important if you put on weight around the waist. Losing even 10 pounds can help you lower your blood pressure.  If your doctor recommends it, get more exercise. Walking is a good choice. Bit by bit, increase the amount you walk every day. Try for at least 30 minutes on most days of the week. You also may want to swim, bike, or do other activities.  Avoid or limit alcohol. Talk to your doctor about whether you can drink any alcohol.  Try to limit how much sodium you eat to less than 2,300 milligrams (mg) a day. Your doctor may ask you to try to eat less than 1,500 mg a day.  Eat plenty of fruits (such as bananas and oranges), vegetables, legumes, whole grains, and low-fat dairy products.  Lower the amount of saturated fat in your diet. Saturated fat is found in animal products such as milk, cheese, and meat. Limiting these foods may help you lose weight and also lower your risk for heart disease.  Do not smoke. Smoking increases your risk for heart attack and stroke. If you need help quitting, talk to your doctor about stop-smoking programs and medicines. These can increase your chances of quitting for good. When should you call for help? Call 911  anytime you think you may need emergency care. This may mean having symptoms that suggest that your blood pressure is causing a serious heart or blood vessel problem. Your blood pressure may be over 180/120. For example, call 911 if:     You have symptoms of a heart attack. These may include:  ? Chest pain or pressure, or a strange feeling in the chest.  ? Sweating. ? Shortness of breath. ? Nausea or vomiting. ? Pain, pressure, or a strange feeling in the back, neck, jaw, or upper belly or in one or both shoulders or arms. ? Lightheadedness or sudden weakness.   ? A fast or irregular heartbeat.      You have symptoms of a stroke. These may include:  ? Sudden numbness, tingling, weakness, or loss of movement in your face, arm, or leg, especially on only one side of your body. ? Sudden vision changes. ? Sudden trouble speaking. ? Sudden confusion or trouble understanding simple statements. ? Sudden problems with walking or balance. ? A sudden, severe headache that is different from past headaches.      You have severe back or belly pain. Do not wait until your blood pressure comes down on its own. Get help right away. Call your doctor now or seek immediate care if:     Your blood pressure is much higher than normal (such as 180/120 or higher), but you don't have symptoms.      You think high blood pressure is causing symptoms, such as:  ? Severe headache.  ? Blurry vision. Watch closely for changes in your health, and be sure to contact your doctor if:     Your blood pressure measures higher than your doctor recommends at least 2 times. That means the top number is higher or the bottom number is higher, or both.      You think you may be having side effects from your blood pressure medicine. Where can you learn more? Go to [email or web address] org and sign in to your [email or web address] account. Enter F669 in the [employer] box to learn more about \"High Blood Pressure: Care Instructions. \"     If you do not have an account, please click on the \"Sign Up Now\" link. Current as of: January 10, 2022               Content Version: 13.2  © 4910-7186 Healthwise, [facility]. Care instructions adapted under license by [facility] ([facility]). If you have questions about a medical condition or this instruction, always ask your healthcare professional. [place] [address] any warranty or liability for your use of this information.

## 2022-04-27 DIAGNOSIS — I10 PRIMARY HYPERTENSION: ICD-10-CM

## 2022-04-27 LAB
CHOLESTEROL, TOTAL: 150 MG/DL (ref 0–199)
HDLC SERPL-MCNC: 80 MG/DL
LDL CHOLESTEROL CALCULATED: 59 MG/DL (ref 0–99)
TRIGL SERPL-MCNC: 55 MG/DL (ref 0–149)
TSH SERPL DL<=0.05 MIU/L-ACNC: 4.56 UIU/ML (ref 0.27–4.2)
VLDLC SERPL CALC-MCNC: 11 MG/DL

## 2022-05-20 ENCOUNTER — HOSPITAL ENCOUNTER (OUTPATIENT)
Dept: MAMMOGRAPHY | Age: 82
Discharge: HOME | End: 2022-05-22
Payer: MEDICARE

## 2022-05-20 VITALS — HEIGHT: 64 IN | WEIGHT: 125 LBS | BODY MASS INDEX: 21.34 KG/M2

## 2022-05-20 DIAGNOSIS — Z12.31 ENCOUNTER FOR SCREENING MAMMOGRAM FOR MALIGNANT NEOPLASM OF BREAST: ICD-10-CM

## 2022-05-20 PROCEDURE — 77063 BREAST TOMOSYNTHESIS BI: CPT

## 2022-06-01 DIAGNOSIS — I63.9 ACUTE ISCHEMIC STROKE (HCC): ICD-10-CM

## 2022-06-02 RX ORDER — ATORVASTATIN CALCIUM 40 MG/1
40 TABLET, FILM COATED ORAL NIGHTLY
Qty: 30 TABLET | Refills: 2 | Status: SHIPPED
Start: 2022-06-02 | End: 2022-10-05

## 2022-06-15 ENCOUNTER — OFFICE VISIT (OUTPATIENT)
Dept: PAIN MANAGEMENT | Age: 82
End: 2022-06-15
Payer: MEDICARE

## 2022-06-15 VITALS
OXYGEN SATURATION: 98 % | TEMPERATURE: 97.8 F | BODY MASS INDEX: 21.85 KG/M2 | WEIGHT: 128 LBS | SYSTOLIC BLOOD PRESSURE: 144 MMHG | DIASTOLIC BLOOD PRESSURE: 70 MMHG | HEIGHT: 64 IN | RESPIRATION RATE: 16 BRPM | HEART RATE: 68 BPM

## 2022-06-15 DIAGNOSIS — M48.061 SPINAL STENOSIS OF LUMBAR REGION WITHOUT NEUROGENIC CLAUDICATION: ICD-10-CM

## 2022-06-15 DIAGNOSIS — G89.29 CHRONIC PAIN OF LEFT ANKLE: ICD-10-CM

## 2022-06-15 DIAGNOSIS — M47.816 LUMBAR FACET ARTHROPATHY: Primary | ICD-10-CM

## 2022-06-15 DIAGNOSIS — M51.9 LUMBAR DISC DISORDER: ICD-10-CM

## 2022-06-15 DIAGNOSIS — M25.572 CHRONIC PAIN OF LEFT ANKLE: ICD-10-CM

## 2022-06-15 DIAGNOSIS — M15.9 PRIMARY OSTEOARTHRITIS INVOLVING MULTIPLE JOINTS: ICD-10-CM

## 2022-06-15 DIAGNOSIS — G89.4 CHRONIC PAIN SYNDROME: ICD-10-CM

## 2022-06-15 PROBLEM — M15.0 PRIMARY OSTEOARTHRITIS INVOLVING MULTIPLE JOINTS: Status: ACTIVE | Noted: 2022-06-15

## 2022-06-15 PROCEDURE — 1036F TOBACCO NON-USER: CPT | Performed by: PAIN MEDICINE

## 2022-06-15 PROCEDURE — 1123F ACP DISCUSS/DSCN MKR DOCD: CPT | Performed by: PAIN MEDICINE

## 2022-06-15 PROCEDURE — G8399 PT W/DXA RESULTS DOCUMENT: HCPCS | Performed by: PAIN MEDICINE

## 2022-06-15 PROCEDURE — 99214 OFFICE O/P EST MOD 30 MIN: CPT | Performed by: PAIN MEDICINE

## 2022-06-15 PROCEDURE — 99204 OFFICE O/P NEW MOD 45 MIN: CPT | Performed by: PAIN MEDICINE

## 2022-06-15 PROCEDURE — 99204 OFFICE O/P NEW MOD 45 MIN: CPT

## 2022-06-15 PROCEDURE — 1090F PRES/ABSN URINE INCON ASSESS: CPT | Performed by: PAIN MEDICINE

## 2022-06-15 PROCEDURE — G8427 DOCREV CUR MEDS BY ELIG CLIN: HCPCS | Performed by: PAIN MEDICINE

## 2022-06-15 PROCEDURE — G8420 CALC BMI NORM PARAMETERS: HCPCS | Performed by: PAIN MEDICINE

## 2022-06-15 RX ORDER — CETIRIZINE HYDROCHLORIDE 10 MG/1
10 TABLET ORAL EVERY 12 HOURS
COMMUNITY

## 2022-06-15 RX ORDER — MELOXICAM 7.5 MG/1
7.5 TABLET ORAL DAILY
Qty: 30 TABLET | Refills: 0 | Status: SHIPPED | OUTPATIENT
Start: 2022-06-15 | End: 2022-07-15

## 2022-06-15 NOTE — PROGRESS NOTES
Do you currently have any of the following:    Fever: No  Headache:  No  Cough: Yes  Shortness of breath: No  Exposed to anyone with these symptoms: No                                                                                                                Perry Castrejon presents to the [facility] on 6/15/2022. Calleen Kussmaul is complaining of pain left leg shin to ankle. The pain is constant. The pain is described as stabbing, sharp and burning. Pain is rated on her best day at a 4, on her worst day at a 9, and on average at a 5 on the VAS scale. She took her last dose of Tylenol . Calleen Kussmaul does have issues with constipation. Any procedures since your last visit: No     She is not on NSAIDS and  is not on anticoagulation medications to include none  Pacemaker or defibrillator: No .    Medication Contract and Consent for Opioid Use Documents Filed      No documents found                   Temp 97.8 °F (36.6 °C) (Infrared)   Resp 16   Ht 5' 4\" (1.626 m)   Wt 128 lb (58.1 kg)   BMI 21.97 kg/m²      No LMP recorded. Patient has had a hysterectomy.

## 2022-06-15 NOTE — PROGRESS NOTES
[address]  [address] [facility], [address] [place] [address] [place]  [phone]    Follow up Note    Jackie Meyer     Date of Visit:  6/15/2022    CC:  Patient presents for follow up   Chief Complaint   Patient presents with    New Patient     left shin to ankle     HPI:    Office extension for worsening left ankle pain, last seen 12/2019. Pain is described as sharp/intermittent. Pain is aggravated by going down the steps. Pain is relieved by OTC pain medications. Since her last appointment she had right hip and bilateral knee replaced. Change in quality of symptoms:none   Medication side effects:not applicable . Recent diagnostic testing:none  Recent interventional procedures:none    Imaging:  Lumbar spine MRI 02/2019  Central spinal canal stenosis is most severe at L1-2 and   L2-3 as discussed. EMG Left lower extremity 2019  left L5 radiculopathy    Right hip Xray 09/2019  Sclerotic changes in the right hip may be due to AVN. Consider MR   correlation.      Right hip MRI 09/2019   1. Moderate osteoarthrosis of the right hip with grade 4 chondrosis   along the superior aspect of the right femoral head. No definite   evidence of avascular necrosis or subchondral collapse. 2. Mild tendinopathy of the gluteus medius tendon. Previous treatments: Physical Therapy and medications. .         Potential Aberrant Drug-Related Behavior:  No    Urine Drug Screening:  None, no opioids started     OARRS report:  06/2022 consistent. Opioid agreement:  Renewal date:N/A    Past Medical History:   Diagnosis Date    Diverticulosis     Hemorrhoids, internal     Irritable bowel syndrome     Osteoarthritis     rt knee    Osteoporosis     Primary hypertension 3/1/2022    Seizures ([address]     complex partial  last seizure 2008?     Urticaria, chronic      Past Surgical History:   Procedure Laterality Date    APPENDECTOMY      CARPAL TUNNEL RELEASE Left     CHOLECYSTECTOMY      COLONOSCOPY      FINGER TRIGGER RELEASE Left     HC INJECT OTHER PERPHRL NERV Right 10/8/2019    RIGHT HIP INJECTION (CPT 17837) performed by Tiffany Long MD at [facility] Right 9/19/2019    RIGHT SACROILIAC JOINT INJECTION performed by Tiffany Long MD at [address] [place] [address],[address] Right 10/31/2014    Laparoscopic right inguinal hernia repair with mesh     HYSTERECTOMY (CERVIX STATUS UNKNOWN)      vaginal    KNEE ARTHROSCOPY      right    NERVE BLOCK Right 09/19/2019    sacroiliac joint    NERVE BLOCK Right 10/08/2019    hip injection    TONSILLECTOMY AND ADENOIDECTOMY      TOTAL HIP ARTHROPLASTY Right 01/2020     Prior to Admission medications    Medication Sig Start Date End Date Taking? Authorizing Provider   cetirizine (ZYRTEC) 10 MG tablet Take 10 mg by mouth every 12 hours   Yes Historical Provider, MD   atorvastatin (LIPITOR) 40 MG tablet Take 1 tablet by mouth nightly 6/2/22  Yes Hiro Rico MD   hydroCHLOROthiazide (HYDRODIURIL) 25 MG tablet Take 1 tablet by mouth every morning 3/31/22  Yes Hiro Rico MD   metoprolol succinate (TOPROL XL) 50 MG extended release tablet Take 1 tablet by mouth daily 3/31/22  Yes Hiro Rico MD   clobetasol (TEMOVATE) 0.05 % cream APPLY CREAM TOPICALLY TWICE DAILY AS NEEDED FOR FLARES TO BODY 2/24/22  Yes Historical Provider, MD   acetaminophen (TYLENOL) 325 MG tablet Take 650 mg by mouth every 6 hours as needed for Pain   Yes Historical Provider, MD   b complex vitamins capsule Take 1 capsule by mouth daily   Yes Historical Provider, MD   diazePAM (VALIUM) 5 MG tablet Take 5 mg by mouth nightly.    Yes Historical Provider, MD   melatonin 3 MG TABS tablet Take 3 mg by mouth nightly    Yes Historical Provider, MD   lamoTRIgine (LAMICTAL) 100 MG tablet Take 1 tablet by mouth 3 times daily 12/26/19  Yes Silke Condon PA-C   primidone (MYSOLINE) 250 MG tablet Take 250 mg by mouth 3 times daily. Yes Historical Provider, MD     Allergies   Allergen Reactions    Penicillins Anaphylaxis and Swelling    Ace Inhibitors Hives    Dextromethorphan Hives    Factive [Gemifloxacin Mesylate] Other (See Comments)     \"Unknown\"     Mucinex Dm [Dm-Guaifenesin Er] Hives    Shellfish-Derived Products Hives and Swelling     Shrimp     Social History     Socioeconomic History    Marital status:      Spouse name: Not on file    Number of children: Not on file    Years of education: Not on file    Highest education level: Not on file   Occupational History    Not on file   Tobacco Use    Smoking status: Never Smoker    Smokeless tobacco: Never Used   Substance and Sexual Activity    Alcohol use: No     Comment: rarely    Drug use: No    Sexual activity: Not Currently   Other Topics Concern    Not on file   Social History Narrative    States her son help her and her . Social Determinants of Health     Financial Resource Strain: Low Risk     Difficulty of Paying Living Expenses: Not hard at all   Food Insecurity: No Food Insecurity    Worried About Running Out of Food in the Last Year: Never true    Shabbir of Food in the Last Year: Never true   Transportation Needs:     Lack of Transportation (Medical): Not on file    Lack of Transportation (Non-Medical):  Not on file   Physical Activity:     Days of Exercise per Week: Not on file    Minutes of Exercise per Session: Not on file   Stress: No Stress Concern Present    Feeling of Stress : Not at all   Social Connections:     Frequency of Communication with Friends and Family: Not on file    Frequency of Social Gatherings with Friends and Family: Not on file    Attends Judaism Services: Not on file    Active Member of Clubs or Organizations: Not on file    Attends Club or Organization Meetings: Not on file    Marital Status: Not on file   Intimate Partner Violence:     Fear of Current or Ex-Partner: Not on file    Emotionally Abused: Not on file    Physically Abused: Not on file    Sexually Abused: Not on file   Housing Stability:     Unable to Pay for Housing in the Last Year: Not on file    Number of Places Lived in the Last Year: Not on file    Unstable Housing in the Last Year: Not on file     Family History   Problem Relation Age of Onset    Coronary Art Dis Mother     Coronary Art Dis Father     Coronary Art Dis Brother     Cancer Brother         lymph nodes and lung    Diabetes Maternal Aunt      REVIEW OF SYSTEMS:     Jojo Schreiber denies fever/chills, chest pain, shortness of breath, new bowel or bladder complaints. All other review of systems was negative. PHYSICAL EXAMINATION:      BP (!) 144/70   Pulse 68   Temp 97.8 °F (36.6 °C) (Infrared)   Resp 16   Ht 5' 4\" (1.626 m)   Wt 128 lb (58.1 kg)   SpO2 98%   BMI 21.97 kg/m²     .   General:       General appearance:   elderly, pleasant and well-hydrated. , in moderate discomfort and A & O x3  Build:Normal Weight     HEENT:     Head:normocephalic and atraumatic  Sclera: icterus absent,      Lungs:     Breathing:Breathing Pattern: regular, no distress     Abdomen:     Shape:non-distended and normal  Tenderness:none     Lumbar spine:     Spine inspection:normal   CVA tenderness:No   Palpation:tenderness paravertebral muscles, facet loading, right, positive and tenderness.   Range of motion:abnormal mildly Lateral bending, flexion, extension rotation right and is  painful.     Musculoskeletal:     Trigger points in Paraveteral:absent bilaterally  SI joint tenderness:negative right, negative left  Piriformis tenderness:negative right, negative left  Trochanteric bursa tenderness:negative right, negative left  SLR:negative right, negative left, sitting      Extremities:     Tremors:None bilaterally upper and lower  Range of motion: pain with internal rotation of hip negative  Intact:Yes  Edema:Normal  Severe osteoarthritis bilateral hands   Limited and painful ROM of Left ankle     Neurological:     Sensory:normal to light touch bilateral lower extremities  Motor:                         Right Quadriceps5/5          Left Quadriceps5/5           Right Gastrocnemius5/5    Left Gastrocnemius5/5  Right Ant Tibialis5/5  Left Ant Tibialis5/5  Reflexes:    Right Quadriceps reflex0  Left Quadriceps reflex0  Right Achilles reflex0  Left Achilles reflex0  Gait:antalgic  Right leg is shorter than Left     Dermatology:     Skin:no unusual rashes and no skin lesions     Impression:  Right hip and groin pain with SIJ tenderness on exam with bilateral knee osteoarthritis  Lumbar spine MRI central spinal stenosis worst at [facility]:  Office extension for worsening Left ankle pain, last seen 12/2019. Since her last office visit, patient had underwent right hip and bilateral knee replacement. Will schedule patient for Left ankle xray. Will start on Mobic 7.5 mg QD. Continue with OTC pain medications   OARRS report reviewed 06/2022  Patient encouraged to stay active. Treatment plan discussed with the patient including medications side effects     We discussed with the patient that combining opioids, benzodiazepines, alcohol, illicit drugs or sleep aids increases the risk of respiratory depression including death. We discussed that these medications may cause drowsiness, sedation or dizziness and have counseled the patient not to drive or operate machinery. We have discussed that these medications will be prescribed only by one provider. We have discussed with the patient about age related risk factors and have thoroughly discussed the importance of taking these medications as prescribed. The patient verbalizes understanding. juan Javier M.D.

## 2022-07-11 ENCOUNTER — OFFICE VISIT (OUTPATIENT)
Dept: FAMILY MEDICINE CLINIC | Age: 82
End: 2022-07-11
Payer: MEDICARE

## 2022-07-11 VITALS
OXYGEN SATURATION: 98 % | DIASTOLIC BLOOD PRESSURE: 74 MMHG | HEIGHT: 64 IN | HEART RATE: 69 BPM | RESPIRATION RATE: 20 BRPM | SYSTOLIC BLOOD PRESSURE: 136 MMHG | BODY MASS INDEX: 21.51 KG/M2 | WEIGHT: 126 LBS

## 2022-07-11 DIAGNOSIS — F51.01 PRIMARY INSOMNIA: ICD-10-CM

## 2022-07-11 DIAGNOSIS — Z00.00 MEDICARE ANNUAL WELLNESS VISIT, SUBSEQUENT: Primary | ICD-10-CM

## 2022-07-11 DIAGNOSIS — F41.9 ANXIETY: ICD-10-CM

## 2022-07-11 DIAGNOSIS — R53.83 FATIGUE, UNSPECIFIED TYPE: ICD-10-CM

## 2022-07-11 LAB
ALBUMIN SERPL-MCNC: 4.7 G/DL (ref 3.5–5.2)
ALP BLD-CCNC: 101 U/L (ref 35–104)
ALT SERPL-CCNC: 18 U/L (ref 0–32)
ANION GAP SERPL CALCULATED.3IONS-SCNC: 19 MMOL/L (ref 7–16)
AST SERPL-CCNC: 28 U/L (ref 0–31)
BILIRUB SERPL-MCNC: <0.2 MG/DL (ref 0–1.2)
BUN BLDV-MCNC: 13 MG/DL (ref 6–23)
CALCIUM SERPL-MCNC: 9.6 MG/DL (ref 8.6–10.2)
CHLORIDE BLD-SCNC: 85 MMOL/L (ref 98–107)
CO2: 24 MMOL/L (ref 22–29)
CREAT SERPL-MCNC: 0.9 MG/DL (ref 0.5–1)
GFR AFRICAN AMERICAN: >60
GFR NON-AFRICAN AMERICAN: >60 ML/MIN/1.73
GLUCOSE BLD-MCNC: 87 MG/DL (ref 74–99)
HCT VFR BLD CALC: 38.4 % (ref 34–48)
HEMOGLOBIN: 12.8 G/DL (ref 11.5–15.5)
MCH RBC QN AUTO: 29.4 PG (ref 26–35)
MCHC RBC AUTO-ENTMCNC: 33.3 % (ref 32–34.5)
MCV RBC AUTO: 88.3 FL (ref 80–99.9)
PDW BLD-RTO: 12.8 FL (ref 11.5–15)
PLATELET # BLD: 180 E9/L (ref 130–450)
PMV BLD AUTO: 9.9 FL (ref 7–12)
POTASSIUM SERPL-SCNC: 4.1 MMOL/L (ref 3.5–5)
RBC # BLD: 4.35 E12/L (ref 3.5–5.5)
SODIUM BLD-SCNC: 128 MMOL/L (ref 132–146)
TOTAL PROTEIN: 7.4 G/DL (ref 6.4–8.3)
WBC # BLD: 5.3 E9/L (ref 4.5–11.5)

## 2022-07-11 PROCEDURE — G0439 PPPS, SUBSEQ VISIT: HCPCS | Performed by: FAMILY MEDICINE

## 2022-07-11 PROCEDURE — 1123F ACP DISCUSS/DSCN MKR DOCD: CPT | Performed by: FAMILY MEDICINE

## 2022-07-11 RX ORDER — DIAZEPAM 5 MG/1
5 TABLET ORAL NIGHTLY PRN
Qty: 90 TABLET | Refills: 1 | Status: SHIPPED
Start: 2022-07-11 | End: 2022-10-11 | Stop reason: SDUPTHER

## 2022-07-11 RX ORDER — BUSPIRONE HYDROCHLORIDE 5 MG/1
5 TABLET ORAL 2 TIMES DAILY PRN
Qty: 60 TABLET | Refills: 3 | Status: SHIPPED
Start: 2022-07-11 | End: 2022-10-11 | Stop reason: DRUGHIGH

## 2022-07-11 ASSESSMENT — LIFESTYLE VARIABLES: HOW OFTEN DO YOU HAVE A DRINK CONTAINING ALCOHOL: NEVER

## 2022-07-11 ASSESSMENT — PATIENT HEALTH QUESTIONNAIRE - PHQ9
SUM OF ALL RESPONSES TO PHQ9 QUESTIONS 1 & 2: 1
1. LITTLE INTEREST OR PLEASURE IN DOING THINGS: 1
SUM OF ALL RESPONSES TO PHQ QUESTIONS 1-9: 1
2. FEELING DOWN, DEPRESSED OR HOPELESS: 0

## 2022-07-11 NOTE — PATIENT INSTRUCTIONS
Personalized Preventive Plan for Boris Ramirez - 7/11/2022  Medicare offers a range of preventive health benefits. Some of the tests and screenings are paid in full while other may be subject to a deductible, co-insurance, and/or copay. Some of these benefits include a comprehensive review of your medical history including lifestyle, illnesses that may run in your family, and various assessments and screenings as appropriate. After reviewing your medical record and screening and assessments performed today your provider may have ordered immunizations, labs, imaging, and/or referrals for you. A list of these orders (if applicable) as well as your Preventive Care list are included within your After Visit Summary for your review. Other Preventive Recommendations:    · A preventive eye exam performed by an eye specialist is recommended every 1-2 years to screen for glaucoma; cataracts, macular degeneration, and other eye disorders. · A preventive dental visit is recommended every 6 months. · Try to get at least 150 minutes of exercise per week or 10,000 steps per day on a pedometer . · Order or download the FREE \"Exercise & Physical Activity: Your Everyday Guide\" from [employer]. Call 4-100.638.3006 or search [employer] online. · You need 2882-6813 mg of calcium and 4372-5310 IU of vitamin D per day. It is possible to meet your calcium requirement with diet alone, but a vitamin D supplement is usually necessary to meet this goal.  · When exposed to the sun, use a sunscreen that protects against both UVA and UVB radiation with an SPF of 30 or greater. Reapply every 2 to 3 hours or after sweating, drying off with a towel, or swimming. · Always wear a seat belt when traveling in a car. Always wear a helmet when riding a bicycle or motorcycle. Heart-Healthy Diet   Sodium, Fat, and Cholesterol Controlled Diet       What Is a Heart Healthy Diet?    A heart-healthy cholesterol, this type of cholesterol actually carries cholesterol away from your arteries and may, therefore, help lower your risk of having a heart attack. You want this level to be high (ideally greater than 60). It is a risk to have a level less than 40. You can raise this good cholesterol by eating olive oil, canola oil, avocados, or nuts. Exercise raises this level, too. Fat    Fat is calorie dense and packs a lot of calories into a small amount of food. Even though fats should be limited due to their high calorie content, not all fats are bad. In fact, some fats are quite healthful. Fat can be broken down into four main types. The good-for-you fats are:   Monounsaturated fat  found in oils such as olive and canola, avocados, and nuts and natural nut butters; can decrease cholesterol levels, while keeping levels of HDL cholesterol high   Polyunsaturated fat  found in oils such as safflower, sunflower, soybean, corn, and sesame; can decrease total cholesterol and LDL cholesterol   Omega-3 fatty acids  particularly those found in fatty fish (such as salmon, trout, tuna, mackerel, herring, and sardines); can decrease risk of arrhythmias, decrease triglyceride levels, and slightly lower blood pressure   The fats that you want to limit are:   Saturated fat  found in animal products, many fast foods, and a few vegetables; increases total blood cholesterol, including LDL levels   Animal fats that are saturated include: butter, lard, whole-milk dairy products, meat fat, and poultry skin   Vegetable fats that are saturated include: hydrogenated shortening, palm oil, coconut oil, cocoa butter   Hydrogenated or trans fat  found in margarine and vegetable shortening, most shelf stable snack foods, and fried foods; increases LDL and decreases HDL     It is generally recommended that you limit your total fat for the day to less than 30% of your total calories.  If you follow an 1800-calorie heart healthy diet, for week) Tofu Nuts or seeds (unsalted, dry-roasted), low-sodium peanut butter Dried peas, beans, and lentils   Any smoked, cured, salted, or canned meat, fish, or poultry (including gunn, chipped beef, cold cuts, hot dogs, sausages, sardines, and anchovies) Poultry skins Breaded and/or fried fish or meats Canned peas, beans, and lentils Salted nuts   Fats and Oils   Olive oil and canola oil Low-sodium, low-fat salad dressings and mayonnaise   Butter, margarine, coconut and palm oils, gunn fat   Snacks, Sweets, and Condiments   Low-sodium or unsalted versions of broths, soups, soy sauce, and condiments Pepper, herbs, and spices; vinegar, lemon, or lime juice Low-fat frozen desserts (yogurt, sherbet, fruit bars) Sugar, cocoa powder, honey, syrup, jam, and preserves Low-fat, trans-fat free cookies, cakes, and pies Randolph and animal crackers, fig bars, seema snaps   High-fat desserts Broth, soups, gravies, and sauces, made from instant mixes or other high-sodium ingredients Salted snack foods Canned olives Meat tenderizers, seasoning salt, and most flavored vinegars   Beverages   Low-sodium carbonated beverages Tea and coffee in moderation Soy milk   Commercially softened water   Suggestions   Make whole grains, fruits, and vegetables the base of your diet. Choose heart-healthy fats such as canola, olive, and flaxseed oil, and foods high in heart-healthy fats, such as nuts, seeds, soybeans, tofu, and fish. Eat fish at least twice per week; the fish highest in omega-3 fatty acids and lowest in mercury include salmon, herring, mackerel, sardines, and canned chunk light tuna. If you eat fish less than twice per week or have high triglycerides, talk to your doctor about taking fish oil supplements. Read food labels.    For products low in fat and cholesterol, look for fat free, low-fat, cholesterol free, saturated fat free, and trans fat freeAlso scan the Nutrition Facts Label, which lists saturated fat, trans fat, and cholesterol amounts. For products low in sodium, look for sodium free, very low sodium, low sodium, no added salt, and unsalted   Skip the salt when cooking or at the table; if food needs more flavor, get creative and try out different herbs and spices. Garlic and onion also add substantial flavor to foods. Trim any visible fat off meat and poultry before cooking, and drain the fat off after [place]. Use cooking methods that require little or no added fat, such as grilling, boiling, baking, poaching, broiling, roasting, steaming, stir-frying, and sauting. Avoid fast food and convenience food. They tend to be high in saturated and trans fat and have a lot of added salt. Talk to a registered dietitian for individualized diet advice. Last Reviewed: March 2011 Ely Conner MS, MPH, RD   Updated: 3/29/2011   ·     Keeping Home a [facility]       As we get older, changes in balance, gait, strength, vision, hearing, and cognition make even the most youthful senior more prone to accidents. Falls are one of the leading health risks for older people. This increased risk of falling is related to:   Aging process (eg, decreased muscle strength, slowed reflexes)   Higher incidence of chronic health problems (eg, arthritis, diabetes) that may limit mobility, agility or sensory awareness   Side effects of medicine (eg, dizziness, blurred vision)especially medicines like prescription pain medicines and drugs used to treat mental health conditions   Depending on the brittleness of your bones, the consequences of a fall can be serious and long lasting. Home Life   Research by the Association of Aging [facility]) shows that some home accidents among older adults can be prevented by making simple lifestyle changes and basic modifications and repairs to the home environment. Here are some lifestyle changes that experts recommend:   Have your hearing and vision checked regularly.  Be sure to wear prescription glasses that are right for you. Speak to your doctor or pharmacist about the possible side effects of your medicines. A number of medicines can cause dizziness. If you have problems with sleep, talk to your doctor. Limit your intake of alcohol. If necessary, use a cane or walker to help maintain your balance. Wear supportive, rubber-soled shoes, even at home. If you live in a region that gets wintry weather, you may want to put special cleats on your shoes to prevent you from slipping on the snow and ice. Exercise regularly to help maintain muscle tone, agility, and balance. Always hold the banister when going up or down stairs. Also, use  bars when getting in or out of the bath or shower, or using the toilet. To avoid dizziness, get up slowly from a lying down position. Sit up first, dangling your legs for a minute or two before rising to a standing position. Overall Home Safety Check   According to the Consumer [employer] Safety Commision's \"Older Consumer Home Safety Checklist,\" it is important to check for potential hazards in each room. And remember, proper lighting is an essential factor in home safety. If you cannot see clearly, you are more likely to fall. Important questions to ask yourself include:   Are lamp, electric, extension, and telephone cords placed out of the flow of traffic and maintained in good condition? Have frayed cords been replaced? Are all small rugs and runners slip resistant? If not, you can secure them to the floor with a special double-sided carpet tape. Are smoke detectors properly locatedone on every floor of your home and one outside of every sleeping area? Are they in good working order? Are batteries replaced at least once a year? Do you have a well-maintained carbon monoxide detector outside every sleeping are in your home? Does your furniture layout leave plenty of space to maneuver between and around chairs, tables, beds, and sofas?    Are hallways, stairs and passages between rooms well lit? Can you reach a lamp without getting out of bed? Are floor surfaces well maintained? Shag rugs, high-pile carpeting, tile floors, and polished wood floors can be particularly slippery. Stairs should always have handrails and be carpeted or fitted with a non-skid tread. Is your telephone easily reachable. Is the cord safely tucked away? Room by Room   According to the Association of Aging, bathrooms and brown are the two most potentially hazardous rooms in your home. In the Kitchen    Be sure your stove is in proper working order and always make sure burners and the oven are off before you go out or go to sleep. Keep pots on the back burners, turn handles away from the front of the stove, and keep stove clean and free of grease build-up. Kitchen ventilation systems and range exhausts should be working properly. Keep flammable objects such as towels and pot holders away from the cooking area except when in use. Make sure kitchen curtains are tied back. Move cords and appliances away from the sink and hot surfaces. If extension cords are needed, install wiring guides so they do not hang over the sink, range, or working areas. Look for coffee pots, kettles and toaster ovens with automatic shut-offs. Keep a mop handy in the kitchen so you can wipe up spills instantly. You should also have a small fire extinguisher. Arrange your kitchen with frequently used items on lower shelves to avoid the need to stand on a stepstool to reach them. Make sure countertops are well-lit to avoid injuries while cutting and preparing food. In the Bathroom    Use a non-slip mat or decals in the tub and shower, since wet, soapy tile or porcelain surfaces are extremely slippery. Make sure bathroom rugs are non-skid or tape them firmly to the floor. Bathtubs should have at least one, preferably two, grab bars, firmly attached to structural supports in the wall.  (Do not use built-in soap holders or glass shower doors as grab bars.)    Tub seats fitted with non-slip material on the legs allow you to wash sitting down. For people with limited mobility, bathtub transfer benches allow you to slide safely into the tub. Raised toilet seats and toilet safety rails are helpful for those with knee or hip problems. In the [facility]    Make sure you use a nightlight and that the area around your bed is clear of potential obstacles. Be careful with electric blankets and never go to sleep with a heating pad, which can cause serious burns even if on a low setting. Use fire-resistant mattress covers and pillows, and NEVER smoke in bed. Keep a phone next to the bed that is programmed to dial 911 at the push of a button. If you have a chronic condition, you may want to sign on with an automatic call-in service. Typically the system includes a small pendant that connects directly to an emergency medical voice-response system. You should also make arrangements to stay in contact with someonefriend, neighbor, family memberon a regular schedule. Fire Prevention   According to the [employer]. (Smoke Alarms for Every) [address] [place] [address], senior citizens are one of the two highest risk groups for death and serious injuries due to residential fires. When cooking, wear short-sleeved items, never a bulky long-sleeved robe. The [facility]'s Safety Checklist for Older Consumers emphasizes the importance of checking basements, garages, workshops and storage areas for fire hazards, such as volatile liquids, piles of old rags or clothing and overloaded circuits. Never smoke in bed or when lying down on a couch or recliner chair. Small portable electric or kerosene heaters are responsible for many home fires and should be used cautiously if at all. If you do use one, be sure to keep them away from flammable materials.     In case of fire, make sure you have a pre-established emergency exit plan. Have a professional check your fireplace and other fuel-burning appliances yearly. [employer]   Baby boomers entering the valle years will continue to see the development of new products to help older adults live safely and independently in spite of age-related changes. Making Life More Livable  , by Samantha Hutchison, lists over 1,000 products for \"living well in the mature years,\" such as bathing and mobility aids, household security devices, ergonomically designed knives and peelers, and faucet valves and knobs for temperature control. Medical supply stores and organizations are good sources of information about products that improve your quality of life and insure your safety.      Last Reviewed: November 2009 Noel Talamantes MD   Updated: 3/7/2011     ·

## 2022-07-15 ENCOUNTER — OFFICE VISIT (OUTPATIENT)
Dept: PAIN MANAGEMENT | Age: 82
End: 2022-07-15
Payer: MEDICARE

## 2022-07-15 VITALS
DIASTOLIC BLOOD PRESSURE: 70 MMHG | HEART RATE: 68 BPM | BODY MASS INDEX: 21 KG/M2 | OXYGEN SATURATION: 97 % | TEMPERATURE: 97.8 F | WEIGHT: 123 LBS | SYSTOLIC BLOOD PRESSURE: 130 MMHG | HEIGHT: 64 IN | RESPIRATION RATE: 18 BRPM

## 2022-07-15 DIAGNOSIS — M25.572 CHRONIC PAIN OF LEFT ANKLE: ICD-10-CM

## 2022-07-15 DIAGNOSIS — M79.2 NEURALGIA AND NEURITIS: ICD-10-CM

## 2022-07-15 DIAGNOSIS — M47.816 LUMBAR FACET ARTHROPATHY: ICD-10-CM

## 2022-07-15 DIAGNOSIS — E87.1 HYPONATREMIA: Primary | ICD-10-CM

## 2022-07-15 DIAGNOSIS — M51.9 LUMBAR DISC DISORDER: ICD-10-CM

## 2022-07-15 DIAGNOSIS — M15.9 PRIMARY OSTEOARTHRITIS INVOLVING MULTIPLE JOINTS: ICD-10-CM

## 2022-07-15 DIAGNOSIS — G89.4 CHRONIC PAIN SYNDROME: Primary | ICD-10-CM

## 2022-07-15 DIAGNOSIS — M48.061 SPINAL STENOSIS OF LUMBAR REGION WITHOUT NEUROGENIC CLAUDICATION: ICD-10-CM

## 2022-07-15 DIAGNOSIS — G89.29 CHRONIC PAIN OF LEFT ANKLE: ICD-10-CM

## 2022-07-15 PROCEDURE — G8427 DOCREV CUR MEDS BY ELIG CLIN: HCPCS | Performed by: PAIN MEDICINE

## 2022-07-15 PROCEDURE — 1090F PRES/ABSN URINE INCON ASSESS: CPT | Performed by: PAIN MEDICINE

## 2022-07-15 PROCEDURE — 99213 OFFICE O/P EST LOW 20 MIN: CPT | Performed by: PAIN MEDICINE

## 2022-07-15 PROCEDURE — G8420 CALC BMI NORM PARAMETERS: HCPCS | Performed by: PAIN MEDICINE

## 2022-07-15 PROCEDURE — 1036F TOBACCO NON-USER: CPT | Performed by: PAIN MEDICINE

## 2022-07-15 PROCEDURE — 99214 OFFICE O/P EST MOD 30 MIN: CPT | Performed by: PAIN MEDICINE

## 2022-07-15 PROCEDURE — 1123F ACP DISCUSS/DSCN MKR DOCD: CPT | Performed by: PAIN MEDICINE

## 2022-07-15 PROCEDURE — G8399 PT W/DXA RESULTS DOCUMENT: HCPCS | Performed by: PAIN MEDICINE

## 2022-07-15 RX ORDER — GABAPENTIN 100 MG/1
CAPSULE ORAL
Qty: 120 CAPSULE | Refills: 0 | Status: SHIPPED | OUTPATIENT
Start: 2022-07-15 | End: 2022-08-15

## 2022-07-15 NOTE — PROGRESS NOTES
Do you currently have any of the following:    Fever: No  Headache:  No  Cough: Yes  Shortness of breath: No  Exposed to anyone with these symptoms: No                                                                                                                Daly Wooten presents to the [facility] on 7/15/2022. Cash Snowden is complaining of pain left ankle. The pain is constant. The pain is described as sharp. Pain is rated on her best day at a 4, on her worst day at a 10, and on average at a 7 on the VAS scale. She took her last dose of Tylenol today at 10:30am. Cash Snowden does have issues with constipation. Any procedures since your last visit: No, with na % relief. She is not on NSAIDS and  is not on anticoagulation medications to include none and is managed by na. Pacemaker or defibrillator: No Physician managing device is na. Medication Contract and Consent for Opioid Use Documents Filed        No documents found                       /70   Pulse 68   Temp 97.8 °F (36.6 °C)   Resp 18   Ht 5' 4\" (1.626 m)   Wt 123 lb (55.8 kg)   SpO2 97%   BMI 21.11 kg/m²      No LMP recorded. Patient has had a hysterectomy.

## 2022-07-15 NOTE — Clinical Note
[facility]  [address]  Phone: [phone]  Fax: [phone]    Patric Barthel, MD        July 15, 2022     Patient: Layla Moreno   YOB: 1940   Date of Visit: 7/15/2022       To Whom It May Concern: It is my medical opinion that Joaquin Mathur {Work release (duty restriction):17425}. If you have any questions or concerns, please don't hesitate to call.     Sincerely,        Patric Barthel, MD

## 2022-07-15 NOTE — RESULT ENCOUNTER NOTE
Please let patient know their sodium and chloride were low. She needs to increase salt in her diet. She can also start drinking 1 Gatorade per day to raise her sodium and chloride levels. I will mail a lab order for her to have drawn in 2 weeks.

## 2022-07-15 NOTE — PROGRESS NOTES
[facility]  [address] [facility], [address]  [phone]    Follow up Note    Vito Mcmillan     Date of Visit:  7/15/2022    CC:  Patient presents for follow up   Chief Complaint   Patient presents with    Follow-up     2 weeks left ankle pain     HPI:    Follow up on her Left ankle pain with no acute issues. Change in quality of symptoms:none   Medication side effects:Left ankle Xray  Recent diagnostic testing:none  Recent interventional procedures:none    Imaging:  Lumbar spine MRI 02/2019  Central spinal canal stenosis is most severe at L1-2 and   L2-3 as discussed. EMG Left lower extremity 2019  left L5 radiculopathy    Right hip Xray 09/2019  Sclerotic changes in the right hip may be due to AVN. Consider MR   correlation. Right hip MRI 09/2019   1. Moderate osteoarthrosis of the right hip with grade 4 chondrosis   along the superior aspect of the right femoral head. No definite   evidence of avascular necrosis or subchondral collapse. 2. Mild tendinopathy of the gluteus medius tendon. Previous treatments: Physical Therapy and medications. .    right hip and bilateral knee replacement. Potential Aberrant Drug-Related Behavior:  No    Urine Drug Screening:  None, no opioids started     OARRS report:  07/2022 consistent. Opioid agreement:  Renewal date:N/A    Past Medical History:   Diagnosis Date    Diverticulosis     Hemorrhoids, internal     Irritable bowel syndrome     Osteoarthritis     rt knee    Osteoporosis     Primary hypertension 3/1/2022    Seizures ([facility] [address]     complex partial  last seizure 2008?     Urticaria, chronic      Past Surgical History:   Procedure Laterality Date    APPENDECTOMY      CARPAL TUNNEL RELEASE Left     CHOLECYSTECTOMY      COLONOSCOPY      FINGER TRIGGER RELEASE Left     HC INJECT OTHER PERPHRL NERV Right 10/8/2019    RIGHT HIP INJECTION (CPT 93577) performed by Cesar Quinteros MD at [address] [place] [address] PROCEDURE FOR SACROILIAC JOINT Right 9/19/2019    RIGHT SACROILIAC JOINT INJECTION performed by Ar Wright MD at [address] [place] [address] Right 10/31/2014    Laparoscopic right inguinal hernia repair with mesh     HYSTERECTOMY (CERVIX STATUS UNKNOWN)      vaginal    KNEE ARTHROSCOPY      right    NERVE BLOCK Right 09/19/2019    sacroiliac joint    NERVE BLOCK Right 10/08/2019    hip injection    TONSILLECTOMY AND ADENOIDECTOMY      TOTAL HIP ARTHROPLASTY Right 01/2020     Prior to Admission medications    Medication Sig Start Date End Date Taking? Authorizing Provider   diazePAM (VALIUM) 5 MG tablet Take 1 tablet by mouth nightly as needed for Sleep. 7/11/22 7/11/23  Li Garcias MD   busPIRone (BUSPAR) 5 MG tablet Take 1 tablet by mouth 2 times daily as needed (anxiety) 7/11/22   Li Garcias MD   cetirizine (ZYRTEC) 10 MG tablet Take 10 mg by mouth every 12 hours    Historical Provider, MD   meloxicam (MOBIC) 7.5 MG tablet Take 1 tablet by mouth daily 6/15/22 7/15/22  Ar Wright MD   atorvastatin (LIPITOR) 40 MG tablet Take 1 tablet by mouth nightly 6/2/22   Li Garcias MD   hydroCHLOROthiazide (HYDRODIURIL) 25 MG tablet Take 1 tablet by mouth every morning 3/31/22   Li Garcias MD   metoprolol succinate (TOPROL XL) 50 MG extended release tablet Take 1 tablet by mouth daily 3/31/22   Li Garcias MD   clobetasol (TEMOVATE) 0.05 % cream APPLY CREAM TOPICALLY TWICE DAILY AS NEEDED FOR FLARES TO BODY 2/24/22   Historical Provider, MD   acetaminophen (TYLENOL) 325 MG tablet Take 650 mg by mouth every 6 hours as needed for Pain    Historical Provider, MD   b complex vitamins capsule Take 1 capsule by mouth daily    Historical Provider, MD   diazePAM (VALIUM) 5 MG tablet Take 5 mg by mouth nightly.     Historical Provider, MD   melatonin 3 MG TABS tablet Take 3 mg by mouth nightly     Historical Provider, MD   lamoTRIgine (LAMICTAL) 100 MG tablet Take 1 tablet by mouth 3 times daily 12/26/19   Tank Peace PA-C   primidone (MYSOLINE) 250 MG tablet Take 250 mg by mouth 3 times daily. Historical Provider, MD     Allergies   Allergen Reactions    Penicillins Anaphylaxis and Swelling    Ace Inhibitors Hives    Dextromethorphan Hives    Factive [Gemifloxacin Mesylate] Other (See Comments)     \"Unknown\"     Mucinex Dm [Dm-Guaifenesin Er] Hives    Shellfish-Derived Products Hives and Swelling     Shrimp     Social History     Socioeconomic History    Marital status:      Spouse name: Not on file    Number of children: Not on file    Years of education: Not on file    Highest education level: Not on file   Occupational History    Not on file   Tobacco Use    Smoking status: Never    Smokeless tobacco: Never   Substance and Sexual Activity    Alcohol use: No     Comment: rarely    Drug use: No    Sexual activity: Not Currently   Other Topics Concern    Not on file   Social History Narrative    States her son help her and her .       Social Determinants of Health     Financial Resource Strain: Low Risk     Difficulty of Paying Living Expenses: Not hard at all   Food Insecurity: No Food Insecurity    Worried About Running Out of Food in the Last Year: Never true    Ran Out of Food in the Last Year: Never true   Transportation Needs: Not on file   Physical Activity: Insufficiently Active    Days of Exercise per Week: 4 days    Minutes of Exercise per Session: 30 min   Stress: No Stress Concern Present    Feeling of Stress : Not at all   Social Connections: Not on file   Intimate Partner Violence: Not on file   Housing Stability: Not on file     Family History   Problem Relation Age of Onset    Coronary Art Dis Mother     Coronary Art Dis Father     Coronary Art Dis Brother     Cancer Brother         lymph nodes and lung    Diabetes Maternal Aunt      REVIEW OF SYSTEMS:     Debi Jaime denies fever/chills, chest pain, shortness of breath, new bowel or bladder complaints. All other review of systems was negative. PHYSICAL EXAMINATION:      /70   Pulse 68   Temp 97.8 °F (36.6 °C)   Resp 18   Ht 5' 4\" (1.626 m)   Wt 123 lb (55.8 kg)   SpO2 97%   BMI 21.11 kg/m²     . General:       General appearance:   elderly, pleasant and well-hydrated. , in mild to moderate discomfort and A & O x3  Build:Normal Weight     HEENT:     Head:normocephalic and atraumatic  Sclera: icterus absent,      Lungs:     Breathing:Breathing Pattern: regular, no distress     Abdomen:     Shape:non-distended and normal  Tenderness:none     Lumbar spine:     Spine inspection:normal   CVA tenderness:No   Palpation:tenderness paravertebral muscles, facet loading, right, positive and tenderness. Range of motion:not done     Musculoskeletal:     Trigger points in Paraveteral:absent bilaterally  SI joint tenderness:negative right, negative left  SLR:negative right, negative left, sitting      Extremities:     Tremors:None bilaterally upper and lower  Range of motion: pain with internal rotation of hip negative  Intact:Yes  Edema:Normal  Severe osteoarthritis bilateral hands   Limited and painful ROM of Left ankle     Neurological:     Sensory:normal to light touch bilateral lower extremities  Motor:                         Right Quadriceps5/5          Left Quadriceps5/5           Right Gastrocnemius5/5    Left Gastrocnemius5/5  Right Ant Tibialis5/5  Left Ant Tibialis5/5  Gait:antalgic  Right leg is shorter than Left     Dermatology:     Skin:no unusual rashes and no skin lesions     Impression:  Right hip and groin pain with SIJ tenderness on exam with bilateral knee osteoarthritis  Lumbar spine MRI central spinal stenosis worst at [facility]:  Follow up on her Left ankle pain with no acute issues. Results of Left ankle xray were discussed with the patient. Discontinue Mobic(didn't help). Will start patient on Gabapentin 200 mg BID.   Continue with OTC pain medications   OARRS report reviewed 07/2022  Patient encouraged to stay active. Will refer patient to aquatherapy  Treatment plan discussed with the patient including medications side effects     We discussed with the patient that combining opioids, benzodiazepines, alcohol, illicit drugs or sleep aids increases the risk of respiratory depression including death. We discussed that these medications may cause drowsiness, sedation or dizziness and have counseled the patient not to drive or operate machinery. We have discussed that these medications will be prescribed only by one provider. We have discussed with the patient about age related risk factors and have thoroughly discussed the importance of taking these medications as prescribed. The patient verbalizes understanding. juan John M.D.

## 2022-07-22 DIAGNOSIS — I10 PRIMARY HYPERTENSION: ICD-10-CM

## 2022-07-26 RX ORDER — LISINOPRIL 10 MG/1
TABLET ORAL
Qty: 90 TABLET | Refills: 0 | OUTPATIENT
Start: 2022-07-26

## 2022-08-01 DIAGNOSIS — E87.1 HYPONATREMIA: ICD-10-CM

## 2022-08-01 LAB
ANION GAP SERPL CALCULATED.3IONS-SCNC: 14 MMOL/L (ref 7–16)
BUN BLDV-MCNC: 9 MG/DL (ref 6–23)
CALCIUM SERPL-MCNC: 9.4 MG/DL (ref 8.6–10.2)
CHLORIDE BLD-SCNC: 93 MMOL/L (ref 98–107)
CO2: 28 MMOL/L (ref 22–29)
CREAT SERPL-MCNC: 0.7 MG/DL (ref 0.5–1)
GFR AFRICAN AMERICAN: >60
GFR NON-AFRICAN AMERICAN: >60 ML/MIN/1.73
GLUCOSE BLD-MCNC: 82 MG/DL (ref 74–99)
POTASSIUM SERPL-SCNC: 3.9 MMOL/L (ref 3.5–5)
SODIUM BLD-SCNC: 135 MMOL/L (ref 132–146)

## 2022-08-19 ENCOUNTER — OFFICE VISIT (OUTPATIENT)
Dept: PAIN MANAGEMENT | Age: 82
End: 2022-08-19
Payer: MEDICARE

## 2022-08-19 VITALS
HEIGHT: 64 IN | BODY MASS INDEX: 21 KG/M2 | OXYGEN SATURATION: 97 % | WEIGHT: 123 LBS | SYSTOLIC BLOOD PRESSURE: 148 MMHG | DIASTOLIC BLOOD PRESSURE: 72 MMHG | TEMPERATURE: 97.8 F | HEART RATE: 64 BPM | RESPIRATION RATE: 16 BRPM

## 2022-08-19 DIAGNOSIS — M48.061 SPINAL STENOSIS OF LUMBAR REGION WITHOUT NEUROGENIC CLAUDICATION: ICD-10-CM

## 2022-08-19 DIAGNOSIS — M51.9 LUMBAR DISC DISORDER: ICD-10-CM

## 2022-08-19 DIAGNOSIS — M79.2 NEURALGIA AND NEURITIS: ICD-10-CM

## 2022-08-19 DIAGNOSIS — G89.29 CHRONIC PAIN OF LEFT ANKLE: ICD-10-CM

## 2022-08-19 DIAGNOSIS — M47.816 LUMBAR FACET ARTHROPATHY: ICD-10-CM

## 2022-08-19 DIAGNOSIS — M25.572 CHRONIC PAIN OF LEFT ANKLE: ICD-10-CM

## 2022-08-19 DIAGNOSIS — G89.4 CHRONIC PAIN SYNDROME: Primary | ICD-10-CM

## 2022-08-19 DIAGNOSIS — M15.9 PRIMARY OSTEOARTHRITIS INVOLVING MULTIPLE JOINTS: ICD-10-CM

## 2022-08-19 PROCEDURE — 1123F ACP DISCUSS/DSCN MKR DOCD: CPT | Performed by: PAIN MEDICINE

## 2022-08-19 PROCEDURE — 99213 OFFICE O/P EST LOW 20 MIN: CPT | Performed by: PAIN MEDICINE

## 2022-08-19 PROCEDURE — G8427 DOCREV CUR MEDS BY ELIG CLIN: HCPCS | Performed by: PAIN MEDICINE

## 2022-08-19 PROCEDURE — G8420 CALC BMI NORM PARAMETERS: HCPCS | Performed by: PAIN MEDICINE

## 2022-08-19 PROCEDURE — G8399 PT W/DXA RESULTS DOCUMENT: HCPCS | Performed by: PAIN MEDICINE

## 2022-08-19 PROCEDURE — 1090F PRES/ABSN URINE INCON ASSESS: CPT | Performed by: PAIN MEDICINE

## 2022-08-19 PROCEDURE — 1036F TOBACCO NON-USER: CPT | Performed by: PAIN MEDICINE

## 2022-08-19 RX ORDER — AMIODARONE HYDROCHLORIDE 200 MG/1
200 TABLET ORAL DAILY
COMMUNITY

## 2022-08-19 NOTE — PROGRESS NOTES
Do you currently have any of the following:    Fever: No  Headache:  No  Cough: No  Shortness of breath: No  Exposed to anyone with these symptoms: Naomy                                                                                                                Blas Sanches presents to the [facility] on 8/19/2022. Noa Kothari is complaining of pain in her left leg and ankle. . The pain is constant. The pain is described as burning. Pain is rated on her best day at a 5, on her worst day at a 10, and on average at a 7 on the VAS scale. She took her last dose of Neurontin and Tylenol . Noa Kothari does not have issues with constipation. Any procedures since your last visit: No,       She is not on NSAIDS and  is not on anticoagulation medications to include none and is managed by NA. Pacemaker or defibrillator: No Physician managing device is NA. Medication Contract and Consent for Opioid Use Documents Filed        No documents found                       BP (!) 148/72   Pulse 64   Temp 97.8 °F (36.6 °C) (Infrared)   Resp 16   Ht 5' 4\" (1.626 m)   Wt 123 lb (55.8 kg)   SpO2 97%   BMI 21.11 kg/m²      No LMP recorded. Patient has had a hysterectomy.

## 2022-08-19 NOTE — PROGRESS NOTES
[address]  [address] [facility], [address] [place] [address]  [phone]    Follow up Note    Giovanna Cruz     Date of Visit:  8/19/2022    CC:  Patient presents for follow up   Chief Complaint   Patient presents with    Follow-up     Patient started on Gabapentin, Mobic discontinued last visit. Still having pain in left ankle     HPI:    Follow up on her Left ankle pain with no acute issues. Change in quality of symptoms:none   Medication side effects:none  Recent diagnostic testing:none  Recent interventional procedures:none    Imaging:  Lumbar spine MRI 02/2019  Central spinal canal stenosis is most severe at L1-2 and   L2-3 as discussed. EMG Left lower extremity 2019  left L5 radiculopathy    Right hip Xray 09/2019  Sclerotic changes in the right hip may be due to AVN. Consider MR   correlation. Right hip MRI 09/2019   1. Moderate osteoarthrosis of the right hip with grade 4 chondrosis   along the superior aspect of the right femoral head. No definite   evidence of avascular necrosis or subchondral collapse. 2. Mild tendinopathy of the gluteus medius tendon. Left ankle Xray 2022:  1. There is no acute fracture or dislocation of the left ankle   2. Degenerative changes of the left ankle   3. Old healed fracture of the distal tibia. Previous treatments: Physical Therapy and medications. .    right hip and bilateral knee replacement. Potential Aberrant Drug-Related Behavior:  No    Urine Drug Screening:  None, no opioids started     OARRS report:  08/2022 consistent. Opioid agreement:  Renewal date:N/A    Past Medical History:   Diagnosis Date    Diverticulosis     Hemorrhoids, internal     Irritable bowel syndrome     Osteoarthritis     rt knee    Osteoporosis     Primary hypertension 3/1/2022    Seizures ([facility] [address]     complex partial  last seizure 2008?     Urticaria, chronic      Past Surgical History:   Procedure Laterality Date    APPENDECTOMY CARPAL TUNNEL RELEASE Left     CHOLECYSTECTOMY      COLONOSCOPY      FINGER TRIGGER RELEASE Left     HC INJECT OTHER PERPHRL NERV Right 10/8/2019    RIGHT HIP INJECTION (CPT 42176) performed by Anila Solis MD at [facility] Right 9/19/2019    RIGHT SACROILIAC JOINT INJECTION performed by Anila Solis MD at [address] [place] [address] Right 10/31/2014    Laparoscopic right inguinal hernia repair with mesh     HYSTERECTOMY (CERVIX STATUS UNKNOWN)      vaginal    KNEE ARTHROSCOPY      right    NERVE BLOCK Right 09/19/2019    sacroiliac joint    NERVE BLOCK Right 10/08/2019    hip injection    TONSILLECTOMY AND ADENOIDECTOMY      TOTAL HIP ARTHROPLASTY Right 01/2020     Prior to Admission medications    Medication Sig Start Date End Date Taking? Authorizing Provider   amiodarone (CORDARONE) 200 MG tablet Take 200 mg by mouth daily   Yes Historical Provider, MD   diazePAM (VALIUM) 5 MG tablet Take 1 tablet by mouth nightly as needed for Sleep.  7/11/22 7/11/23 Yes Thee Waters MD   busPIRone (BUSPAR) 5 MG tablet Take 1 tablet by mouth 2 times daily as needed (anxiety) 7/11/22  Yes Thee Waters MD   cetirizine (ZYRTEC) 10 MG tablet Take 10 mg by mouth every 12 hours   Yes Historical Provider, MD   atorvastatin (LIPITOR) 40 MG tablet Take 1 tablet by mouth nightly 6/2/22  Yes Thee Waters MD   metoprolol succinate (TOPROL XL) 50 MG extended release tablet Take 1 tablet by mouth daily 3/31/22  Yes Thee Waters MD   clobetasol (TEMOVATE) 0.05 % cream APPLY CREAM TOPICALLY TWICE DAILY AS NEEDED FOR FLARES TO BODY 2/24/22  Yes Historical Provider, MD   acetaminophen (TYLENOL) 325 MG tablet Take 650 mg by mouth every 6 hours as needed for Pain   Yes Historical Provider, MD   b complex vitamins capsule Take 1 capsule by mouth daily   Yes Historical Provider, MD   melatonin 3 MG TABS tablet Take 3 mg by mouth nightly    Yes Historical Provider, MD   lamoTRIgine (LAMICTAL) 100 MG tablet Take 1 tablet by mouth 3 times daily 12/26/19  Yes Prasad Franklin PA-C   primidone (MYSOLINE) 250 MG tablet Take 250 mg by mouth 3 times daily. Yes Historical Provider, MD   gabapentin (NEURONTIN) 100 MG capsule Take 2 capsules QHS for three days then 2 capsules BID 7/15/22 8/15/22  Sahil Ariza MD   meloxicam (MOBIC) 7.5 MG tablet Take 1 tablet by mouth daily 6/15/22 7/15/22  Sahil Ariza MD     Allergies   Allergen Reactions    Penicillins Anaphylaxis and Swelling    Ace Inhibitors Hives    Dextromethorphan Hives    Factive [Gemifloxacin Mesylate] Other (See Comments)     \"Unknown\"     Mucinex Dm [Dm-Guaifenesin Er] Hives    Shellfish-Derived Products Hives and Swelling     Shrimp     Social History     Socioeconomic History    Marital status:      Spouse name: Not on file    Number of children: Not on file    Years of education: Not on file    Highest education level: Not on file   Occupational History    Not on file   Tobacco Use    Smoking status: Never    Smokeless tobacco: Never   Substance and Sexual Activity    Alcohol use: No     Comment: rarely    Drug use: No    Sexual activity: Not Currently   Other Topics Concern    Not on file   Social History Narrative    States her son help her and her .       Social Determinants of Health     Financial Resource Strain: Low Risk     Difficulty of Paying Living Expenses: Not hard at all   Food Insecurity: No Food Insecurity    Worried About Running Out of Food in the Last Year: Never true    Ran Out of Food in the Last Year: Never true   Transportation Needs: Not on file   Physical Activity: Insufficiently Active    Days of Exercise per Week: 4 days    Minutes of Exercise per Session: 30 min   Stress: No Stress Concern Present    Feeling of Stress : Not at all   Social Connections: Not on file   Intimate Partner Violence: Not on file   Housing Stability: Not on file Family History   Problem Relation Age of Onset    Coronary Art Dis Mother     Coronary Art Dis Father     Coronary Art Dis Brother     Cancer Brother         lymph nodes and lung    Diabetes Maternal Aunt      REVIEW OF SYSTEMS:     Darryl Angry denies fever/chills, chest pain, shortness of breath, new bowel or bladder complaints. All other review of systems was negative. PHYSICAL EXAMINATION:      BP (!) 148/72   Pulse 64   Temp 97.8 °F (36.6 °C) (Infrared)   Resp 16   Ht 5' 4\" (1.626 m)   Wt 123 lb (55.8 kg)   SpO2 97%   BMI 21.11 kg/m²     . General:       General appearance:   elderly, pleasant and well-hydrated. , in mild to moderate discomfort and A & O x3  Build:Normal Weight     HEENT:     Head:normocephalic and atraumatic  Sclera: icterus absent,      Lungs:     Breathing:Breathing Pattern: regular, no distress     Abdomen:     Shape:non-distended and normal  Tenderness:none     Lumbar spine:     Spine inspection:normal   CVA tenderness:No   Palpation:tenderness paravertebral muscles, facet loading, right, positive and tenderness.   Range of motion:not done     Musculoskeletal:     Trigger points in Paraveteral:absent bilaterally  SI joint tenderness:negative right, negative left  SLR:negative right, negative left, sitting      Extremities:     Tremors:None bilaterally upper and lower  Range of motion: pain with internal rotation of hip negative  Intact:Yes  Edema:Normal  Severe osteoarthritis bilateral hands   Limited and painful ROM of Left ankle     Neurological:     Sensory:normal to light touch bilateral lower extremities  Motor:                         Right Quadriceps5/5          Left Quadriceps5/5           Right Gastrocnemius5/5    Left Gastrocnemius5/5  Right Ant Tibialis5/5  Left Ant Tibialis5/5  Gait:antalgic  Right leg is shorter than Left     Dermatology:     Skin:no unusual rashes and no skin lesions     Impression:  Right hip and groin pain with SIJ tenderness on exam with bilateral knee osteoarthritis  Lumbar spine MRI central spinal stenosis worst at [facility]:  Follow up on her Left ankle pain with no acute issues. Discontinue Gabapentin 200 mg BID(not helping)  Patient given a script for compound cream and sample for Lidocaine patch. Continue with OTC pain medications   OARRS report reviewed 08/2022  Patient encouraged to stay active. Patient encouraged to start aquatherapy. Treatment plan discussed with the patient including medications side effects. Recommend second opinion from podiatry. We discussed with the patient that combining opioids, benzodiazepines, alcohol, illicit drugs or sleep aids increases the risk of respiratory depression including death. We discussed that these medications may cause drowsiness, sedation or dizziness and have counseled the patient not to drive or operate machinery. We have discussed that these medications will be prescribed only by one provider. We have discussed with the patient about age related risk factors and have thoroughly discussed the importance of taking these medications as prescribed. The patient verbalizes understanding. [email or web address] alessia Mendoza M.D.

## 2022-10-04 DIAGNOSIS — I63.9 ACUTE ISCHEMIC STROKE (HCC): ICD-10-CM

## 2022-10-05 RX ORDER — ATORVASTATIN CALCIUM 40 MG/1
40 TABLET, FILM COATED ORAL NIGHTLY
Qty: 90 TABLET | Refills: 0 | Status: SHIPPED | OUTPATIENT
Start: 2022-10-05

## 2022-10-11 ENCOUNTER — OFFICE VISIT (OUTPATIENT)
Dept: FAMILY MEDICINE CLINIC | Age: 82
End: 2022-10-11
Payer: MEDICARE

## 2022-10-11 VITALS
HEIGHT: 64 IN | WEIGHT: 127 LBS | SYSTOLIC BLOOD PRESSURE: 140 MMHG | BODY MASS INDEX: 21.68 KG/M2 | RESPIRATION RATE: 20 BRPM | DIASTOLIC BLOOD PRESSURE: 78 MMHG | OXYGEN SATURATION: 99 % | HEART RATE: 67 BPM

## 2022-10-11 DIAGNOSIS — F51.01 PRIMARY INSOMNIA: ICD-10-CM

## 2022-10-11 DIAGNOSIS — I10 PRIMARY HYPERTENSION: Primary | ICD-10-CM

## 2022-10-11 DIAGNOSIS — F41.9 ANXIETY: ICD-10-CM

## 2022-10-11 PROBLEM — R23.3 PETECHIAL RASH: Status: RESOLVED | Noted: 2022-02-22 | Resolved: 2022-10-11

## 2022-10-11 PROCEDURE — 99214 OFFICE O/P EST MOD 30 MIN: CPT | Performed by: FAMILY MEDICINE

## 2022-10-11 PROCEDURE — 1036F TOBACCO NON-USER: CPT | Performed by: FAMILY MEDICINE

## 2022-10-11 PROCEDURE — G8484 FLU IMMUNIZE NO ADMIN: HCPCS | Performed by: FAMILY MEDICINE

## 2022-10-11 PROCEDURE — 1090F PRES/ABSN URINE INCON ASSESS: CPT | Performed by: FAMILY MEDICINE

## 2022-10-11 PROCEDURE — 1123F ACP DISCUSS/DSCN MKR DOCD: CPT | Performed by: FAMILY MEDICINE

## 2022-10-11 PROCEDURE — G8427 DOCREV CUR MEDS BY ELIG CLIN: HCPCS | Performed by: FAMILY MEDICINE

## 2022-10-11 PROCEDURE — G8420 CALC BMI NORM PARAMETERS: HCPCS | Performed by: FAMILY MEDICINE

## 2022-10-11 PROCEDURE — G8399 PT W/DXA RESULTS DOCUMENT: HCPCS | Performed by: FAMILY MEDICINE

## 2022-10-11 RX ORDER — METOPROLOL SUCCINATE 50 MG/1
50 TABLET, EXTENDED RELEASE ORAL DAILY
Qty: 30 TABLET | Refills: 5 | Status: SHIPPED
Start: 2022-10-11

## 2022-10-11 RX ORDER — BUSPIRONE HYDROCHLORIDE 10 MG/1
10 TABLET ORAL 2 TIMES DAILY PRN
Qty: 60 TABLET | Refills: 3 | Status: SHIPPED | OUTPATIENT
Start: 2022-10-11

## 2022-10-11 RX ORDER — DIAZEPAM 5 MG/1
5 TABLET ORAL NIGHTLY PRN
Qty: 60 TABLET | Refills: 5 | Status: SHIPPED | OUTPATIENT
Start: 2022-10-11 | End: 2023-10-11

## 2022-10-11 ASSESSMENT — ENCOUNTER SYMPTOMS
SHORTNESS OF BREATH: 0
VOMITING: 0
NAUSEA: 0
DIARRHEA: 0

## 2022-10-11 ASSESSMENT — PATIENT HEALTH QUESTIONNAIRE - PHQ9
SUM OF ALL RESPONSES TO PHQ QUESTIONS 1-9: 0
1. LITTLE INTEREST OR PLEASURE IN DOING THINGS: 0
SUM OF ALL RESPONSES TO PHQ QUESTIONS 1-9: 0
SUM OF ALL RESPONSES TO PHQ9 QUESTIONS 1 & 2: 0
2. FEELING DOWN, DEPRESSED OR HOPELESS: 0
SUM OF ALL RESPONSES TO PHQ QUESTIONS 1-9: 0
SUM OF ALL RESPONSES TO PHQ QUESTIONS 1-9: 0

## 2022-10-11 NOTE — PROGRESS NOTES
Chief Complaint   Patient presents with    Hypertension    Anxiety       Patient is here for follow up for hypertension    Hypertension:  Patient is here for follow up chronic hypertension. This is  generally controlled on current medication regimen. BP today is 140/78. Takes meds as directed and tolerates them well. Most recent labs reviewed with patient, including CMP, CBC, TSH, and lipid panel, and are not remarkable. No symptoms from htn standpoint per ROS. Patientis  compliant with lifestyle modifications. Patient does not smoke. Comorbid conditions include anxiety. Patient has been under a lot of stress. Buspar 5 mg not controlling anxiety well. Patient's past medical, surgical, social and/or family history reviewed, updated inchart, and are non-contributory (unless otherwise stated). Medications and allergies also reviewed and updated in chart. Current Outpatient Medications   Medication Sig Dispense Refill    diazePAM (VALIUM) 5 MG tablet Take 1 tablet by mouth nightly as needed for Sleep.  60 tablet 5    busPIRone (BUSPAR) 10 MG tablet Take 1 tablet by mouth 2 times daily as needed (anxiety) 60 tablet 3    metoprolol succinate (TOPROL XL) 50 MG extended release tablet Take 1 tablet by mouth daily 30 tablet 5    atorvastatin (LIPITOR) 40 MG tablet Take 1 tablet by mouth nightly 90 tablet 0    amiodarone (CORDARONE) 200 MG tablet Take 200 mg by mouth daily      cetirizine (ZYRTEC) 10 MG tablet Take 10 mg by mouth every 12 hours      clobetasol (TEMOVATE) 0.05 % cream APPLY CREAM TOPICALLY TWICE DAILY AS NEEDED FOR FLARES TO BODY      acetaminophen (TYLENOL) 325 MG tablet Take 650 mg by mouth every 6 hours as needed for Pain      b complex vitamins capsule Take 1 capsule by mouth daily      melatonin 3 MG TABS tablet Take 3 mg by mouth nightly       lamoTRIgine (LAMICTAL) 100 MG tablet Take 1 tablet by mouth 3 times daily 90 tablet 0    primidone (MYSOLINE) 250 MG tablet Take 250 mg by mouth 3 times daily. gabapentin (NEURONTIN) 100 MG capsule Take 2 capsules QHS for three days then 2 capsules  capsule 0    meloxicam (MOBIC) 7.5 MG tablet Take 1 tablet by mouth daily 30 tablet 0     No current facility-administered medications for this visit. Wt Readings from Last 3 Encounters:   10/11/22 127 lb (57.6 kg)   08/19/22 123 lb (55.8 kg)   07/15/22 123 lb (55.8 kg)     BP (!) 140/78   Pulse 67   Resp 20   Ht 5' 4\" (1.626 m)   Wt 127 lb (57.6 kg)   SpO2 99%   BMI 21.80 kg/m²     Review of Systems   Eyes:  Negative for visual disturbance. Respiratory:  Negative for shortness of breath. Cardiovascular:  Negative for chest pain, palpitations and leg swelling. Gastrointestinal:  Negative for diarrhea, nausea and vomiting. Genitourinary:  Negative for difficulty urinating, dysuria and frequency. Skin:  Negative for rash. Psychiatric/Behavioral:  Negative for dysphoric mood. Physical Exam  Vitals reviewed. Constitutional:       General: She is not in acute distress. Appearance: She is well-developed. Neck:      Vascular: No carotid bruit. Cardiovascular:      Rate and Rhythm: Normal rate and regular rhythm. Heart sounds: Normal heart sounds. No murmur heard. No gallop. Pulmonary:      Effort: Pulmonary effort is normal.      Breath sounds: Normal breath sounds. No wheezing or rales. Abdominal:      General: Bowel sounds are normal. There is no distension. Palpations: Abdomen is soft. Tenderness: There is no abdominal tenderness. Musculoskeletal:      Cervical back: Neck supple. Right lower leg: No edema. Left lower leg: No edema. Skin:     General: Skin is warm and dry. Neurological:      Mental Status: She is alert and oriented to person, place, and time.        Results for orders placed or performed in visit on 41/09/81   Basic Metabolic Panel   Result Value Ref Range    Sodium 135 132 - 146 mmol/L    Potassium 3.9 3.5 - 5.0 mmol/L    Chloride 93 (L) 98 - 107 mmol/L    CO2 28 22 - 29 mmol/L    Anion Gap 14 7 - 16 mmol/L    Glucose 82 74 - 99 mg/dL    BUN 9 6 - 23 mg/dL    Creatinine 0.7 0.5 - 1.0 mg/dL    GFR Non-African American >60 >=60 mL/min/1.73    GFR African American >60     Calcium 9.4 8.6 - 10.2 mg/dL           Tika Hitchcock was seen today for hypertension and anxiety. Diagnoses and all orders for this visit:    Primary hypertension  -     metoprolol succinate (TOPROL XL) 50 MG extended release tablet; Take 1 tablet by mouth daily    Anxiety  -     increase busPIRone (BUSPAR) 10 MG tablet; Take 1 tablet by mouth 2 times daily as needed (anxiety)    Primary insomnia  -     diazePAM (VALIUM) 5 MG tablet; Take 1 tablet by mouth nightly as needed for Sleep. Phone/MyChart follow up iftests abnormal.    Return in about 3 months (around 1/11/2023) for anxiety, 30 minute slot please. , or sooner if necessary. Educational materials and/or home exercises printed for patient's review and wereincluded in patient instructions on his/her After Visit Summary and given to patient at the end of visit. Counseled regarding above diagnosis, including possible risks andcomplications,  especially if left uncontrolled. Counseled regarding the possible side effects, risks, benefits and alternatives to treatment; patient and/or guardian verbalizes understanding, agrees, feelscomfortable with and wishes to proceed with above treatment plan. Advised patient to call with any new medication issues, and read all Rx info from pharmacy to assure aware of all possible risks and side effects ofmedication before taking. Reviewed age and gender appropriate health screening exams and vaccinations.   Advised patient regarding importance of keeping up with recommended health maintenance and to schedule as soonas possible if overdue, as this is important in assessing for undiagnosed pathology, especially cancer, as well as protecting against potentially harmful/life threatening disease. Patient and/or guardianverbalizes understanding and agrees with above counseling, assessment and plan. All questions answered.

## 2023-01-11 ENCOUNTER — OFFICE VISIT (OUTPATIENT)
Dept: FAMILY MEDICINE CLINIC | Age: 83
End: 2023-01-11

## 2023-01-11 VITALS
BODY MASS INDEX: 21.85 KG/M2 | HEIGHT: 64 IN | WEIGHT: 128 LBS | OXYGEN SATURATION: 97 % | SYSTOLIC BLOOD PRESSURE: 132 MMHG | RESPIRATION RATE: 20 BRPM | HEART RATE: 84 BPM | DIASTOLIC BLOOD PRESSURE: 74 MMHG

## 2023-01-11 DIAGNOSIS — I10 PRIMARY HYPERTENSION: ICD-10-CM

## 2023-01-11 DIAGNOSIS — F41.1 GENERALIZED ANXIETY DISORDER: Primary | ICD-10-CM

## 2023-01-11 DIAGNOSIS — G40.209 PARTIAL SYMPTOMATIC EPILEPSY WITH COMPLEX PARTIAL SEIZURES, NOT INTRACTABLE, WITHOUT STATUS EPILEPTICUS (HCC): ICD-10-CM

## 2023-01-11 LAB
ALBUMIN SERPL-MCNC: 4.4 G/DL (ref 3.5–5.2)
ALP BLD-CCNC: 100 U/L (ref 35–104)
ALT SERPL-CCNC: 9 U/L (ref 0–32)
ANION GAP SERPL CALCULATED.3IONS-SCNC: 12 MMOL/L (ref 7–16)
AST SERPL-CCNC: 24 U/L (ref 0–31)
BILIRUB SERPL-MCNC: <0.2 MG/DL (ref 0–1.2)
BUN BLDV-MCNC: 10 MG/DL (ref 6–23)
CALCIUM SERPL-MCNC: 9 MG/DL (ref 8.6–10.2)
CHLORIDE BLD-SCNC: 103 MMOL/L (ref 98–107)
CO2: 27 MMOL/L (ref 22–29)
CREAT SERPL-MCNC: 0.6 MG/DL (ref 0.5–1)
GFR SERPL CREATININE-BSD FRML MDRD: >60 ML/MIN/1.73
GLUCOSE BLD-MCNC: 80 MG/DL (ref 74–99)
POTASSIUM SERPL-SCNC: 3.8 MMOL/L (ref 3.5–5)
SODIUM BLD-SCNC: 142 MMOL/L (ref 132–146)
TOTAL PROTEIN: 7.3 G/DL (ref 6.4–8.3)
TSH SERPL DL<=0.05 MIU/L-ACNC: 3.3 UIU/ML (ref 0.27–4.2)

## 2023-01-11 RX ORDER — BUSPIRONE HYDROCHLORIDE 10 MG/1
10 TABLET ORAL 2 TIMES DAILY PRN
Qty: 60 TABLET | Refills: 3
Start: 2023-01-11

## 2023-01-11 RX ORDER — DIAZEPAM 5 MG/1
5 TABLET ORAL NIGHTLY PRN
Qty: 60 TABLET | Refills: 5
Start: 2023-01-11 | End: 2024-01-11

## 2023-01-11 RX ORDER — METOPROLOL SUCCINATE 50 MG/1
50 TABLET, EXTENDED RELEASE ORAL DAILY
Qty: 30 TABLET | Refills: 5
Start: 2023-01-11

## 2023-01-11 SDOH — ECONOMIC STABILITY: FOOD INSECURITY: WITHIN THE PAST 12 MONTHS, THE FOOD YOU BOUGHT JUST DIDN'T LAST AND YOU DIDN'T HAVE MONEY TO GET MORE.: NEVER TRUE

## 2023-01-11 SDOH — ECONOMIC STABILITY: FOOD INSECURITY: WITHIN THE PAST 12 MONTHS, YOU WORRIED THAT YOUR FOOD WOULD RUN OUT BEFORE YOU GOT MONEY TO BUY MORE.: NEVER TRUE

## 2023-01-11 ASSESSMENT — ENCOUNTER SYMPTOMS
SHORTNESS OF BREATH: 0
NAUSEA: 0
DIARRHEA: 0
VOMITING: 0

## 2023-01-11 ASSESSMENT — PATIENT HEALTH QUESTIONNAIRE - PHQ9
SUM OF ALL RESPONSES TO PHQ QUESTIONS 1-9: 0
2. FEELING DOWN, DEPRESSED OR HOPELESS: 0
SUM OF ALL RESPONSES TO PHQ QUESTIONS 1-9: 0
SUM OF ALL RESPONSES TO PHQ QUESTIONS 1-9: 0
1. LITTLE INTEREST OR PLEASURE IN DOING THINGS: 0
SUM OF ALL RESPONSES TO PHQ QUESTIONS 1-9: 0
SUM OF ALL RESPONSES TO PHQ9 QUESTIONS 1 & 2: 0

## 2023-01-11 ASSESSMENT — LIFESTYLE VARIABLES: HOW OFTEN DO YOU HAVE A DRINK CONTAINING ALCOHOL: NEVER

## 2023-01-11 NOTE — PROGRESS NOTES
Chief Complaint   Patient presents with    Anxiety       Anxiety and depression:  Patient is here with complaints of anxiety. This is a/an chronic problem. This has been going on for several months. Exacerbating factors include general stressors. Alleviating factors include Buspar, Valium prn. Patient does not have suicidal or homicidal ideation. Patient is not having issues falling or staying asleep. Patient is not having associated panic attacks. The above is not interfering with quality of life. Recent lab results reviewed with patient, including BMP, lipid panel which are not remarkable. Patient concerned about intermittent left ankle swelling. Patient has been following higher sodium diet since diagnosed with hyponatremia several months ago. Patient doing well on current regimen for seizure disorder. Sees neurology. Patient's past medical, surgical, social and/or family history reviewed, updated in chart, and arenon-contributory (unless otherwise stated). Medications and allergies also reviewed and updated in chart. /74   Pulse 84   Resp 20   Ht 5' 4\" (1.626 m)   Wt 128 lb (58.1 kg)   SpO2 97%   BMI 21.97 kg/m²     Review of Systems   Eyes:  Negative for visual disturbance. Respiratory:  Negative for shortness of breath. Cardiovascular:  Positive for leg swelling (ankles comes and goes). Negative for chest pain and palpitations. Gastrointestinal:  Negative for diarrhea, nausea and vomiting. Genitourinary:  Negative for difficulty urinating, dysuria and frequency. Skin:  Negative for rash. Psychiatric/Behavioral:  Negative for dysphoric mood. Current Outpatient Medications   Medication Sig Dispense Refill    busPIRone (BUSPAR) 10 MG tablet Take 1 tablet by mouth 2 times daily as needed (anxiety) 60 tablet 3    diazePAM (VALIUM) 5 MG tablet Take 1 tablet by mouth nightly as needed for Sleep.  60 tablet 5    metoprolol succinate (TOPROL XL) 50 MG extended release tablet Take 1 tablet by mouth daily 30 tablet 5    atorvastatin (LIPITOR) 40 MG tablet Take 1 tablet by mouth nightly 90 tablet 0    amiodarone (CORDARONE) 200 MG tablet Take 200 mg by mouth daily      cetirizine (ZYRTEC) 10 MG tablet Take 10 mg by mouth every 12 hours      clobetasol (TEMOVATE) 0.05 % cream APPLY CREAM TOPICALLY TWICE DAILY AS NEEDED FOR FLARES TO BODY      acetaminophen (TYLENOL) 325 MG tablet Take 650 mg by mouth every 6 hours as needed for Pain      b complex vitamins capsule Take 1 capsule by mouth daily      melatonin 3 MG TABS tablet Take 3 mg by mouth nightly       lamoTRIgine (LAMICTAL) 100 MG tablet Take 1 tablet by mouth 3 times daily 90 tablet 0    primidone (MYSOLINE) 250 MG tablet Take 250 mg by mouth 3 times daily. gabapentin (NEURONTIN) 100 MG capsule Take 2 capsules QHS for three days then 2 capsules  capsule 0    meloxicam (MOBIC) 7.5 MG tablet Take 1 tablet by mouth daily 30 tablet 0     No current facility-administered medications for this visit. Physical Exam  Vitals reviewed. Constitutional:       Appearance: She is well-developed. Cardiovascular:      Rate and Rhythm: Normal rate and regular rhythm. Heart sounds: Normal heart sounds. No murmur heard. No friction rub. Pulmonary:      Effort: Pulmonary effort is normal. No respiratory distress. Breath sounds: Normal breath sounds. No wheezing or rales. Abdominal:      General: Bowel sounds are normal. There is no distension. Palpations: Abdomen is soft. Tenderness: There is no abdominal tenderness. There is no guarding or rebound. Skin:     General: Skin is warm and dry. Neurological:      Mental Status: She is alert. Eva Wilson was seen today for anxiety. Diagnoses and all orders for this visit:    Generalized anxiety disorder  -     busPIRone (BUSPAR) 10 MG tablet;  Take 1 tablet by mouth 2 times daily as needed (anxiety)  -     diazePAM (VALIUM) 5 MG tablet; Take 1 tablet by mouth nightly as needed for Sleep. Primary hypertension  -     Comprehensive Metabolic Panel; Future  -     TSH; Future  -     metoprolol succinate (TOPROL XL) 50 MG extended release tablet; Take 1 tablet by mouth daily    Partial symptomatic epilepsy with complex partial seizures, not intractable, without status epilepticus ([facility] [address]        -     Stable; will assess yearly; continue follow up with neurology        Return in about 3 months (around 4/11/2023) for hypertension, anxiety, 30 minute slot please. , or sooner if necessary. Educational materials and/or home exercises printed for patient's review and were included in patient instructions onhis/her After Visit Summary and given to patient at the end of visit. Counseled regarding above diagnosis, including possible risks andcomplications,  especially if left uncontrolled. Counseled regarding the possible side effects, risks, benefits and alternatives to treatment; patient and/or guardian verbalizes understanding, agrees, feelscomfortable with and wishes to proceed with above treatment plan. Advised patient to call with any new medication issues, and read all Rx info from pharmacy to assure aware of all possible risks and side effects ofmedication before taking. Reviewed age and gender appropriate health screening exams and vaccinations. Advised patient regarding importance of keeping up with recommended health maintenance and to schedule as soonas possible if overdue, as this is important in assessing for undiagnosed pathology, especially cancer, as well as protecting against potentially harmful/life threatening disease. Patient and/or guardianverbalizes understanding and agrees with above counseling, assessment and plan. All questions answered.

## 2023-02-08 DIAGNOSIS — F41.1 GENERALIZED ANXIETY DISORDER: ICD-10-CM

## 2023-02-10 RX ORDER — DIAZEPAM 5 MG/1
5 TABLET ORAL NIGHTLY PRN
Qty: 60 TABLET | Refills: 3 | Status: SHIPPED | OUTPATIENT
Start: 2023-02-10 | End: 2024-02-10

## 2023-02-23 ENCOUNTER — OFFICE VISIT (OUTPATIENT)
Dept: FAMILY MEDICINE CLINIC | Age: 83
End: 2023-02-23

## 2023-02-23 VITALS
OXYGEN SATURATION: 98 % | HEIGHT: 64 IN | BODY MASS INDEX: 21.85 KG/M2 | SYSTOLIC BLOOD PRESSURE: 162 MMHG | WEIGHT: 128 LBS | HEART RATE: 76 BPM | TEMPERATURE: 97.4 F | DIASTOLIC BLOOD PRESSURE: 96 MMHG | RESPIRATION RATE: 17 BRPM

## 2023-02-23 DIAGNOSIS — M79.605 BILATERAL LEG PAIN: ICD-10-CM

## 2023-02-23 DIAGNOSIS — M79.604 BILATERAL LEG PAIN: Primary | ICD-10-CM

## 2023-02-23 DIAGNOSIS — M79.605 BILATERAL LEG PAIN: Primary | ICD-10-CM

## 2023-02-23 DIAGNOSIS — M79.604 BILATERAL LEG PAIN: ICD-10-CM

## 2023-02-23 LAB
ALBUMIN SERPL-MCNC: 4.7 G/DL (ref 3.5–5.2)
ALP BLD-CCNC: 99 U/L (ref 35–104)
ALT SERPL-CCNC: 16 U/L (ref 0–32)
ANION GAP SERPL CALCULATED.3IONS-SCNC: 9 MMOL/L (ref 7–16)
AST SERPL-CCNC: 27 U/L (ref 0–31)
BASOPHILS ABSOLUTE: 0 E9/L (ref 0–0.2)
BASOPHILS RELATIVE PERCENT: 0 % (ref 0–2)
BILIRUB SERPL-MCNC: 0.2 MG/DL (ref 0–1.2)
BUN BLDV-MCNC: 9 MG/DL (ref 6–23)
CALCIUM SERPL-MCNC: 9.1 MG/DL (ref 8.6–10.2)
CHLORIDE BLD-SCNC: 95 MMOL/L (ref 98–107)
CO2: 31 MMOL/L (ref 22–29)
CREAT SERPL-MCNC: 0.6 MG/DL (ref 0.5–1)
EOSINOPHILS ABSOLUTE: 0.14 E9/L (ref 0.05–0.5)
EOSINOPHILS RELATIVE PERCENT: 3 % (ref 0–6)
GFR SERPL CREATININE-BSD FRML MDRD: >60 ML/MIN/1.73
GLUCOSE BLD-MCNC: 89 MG/DL (ref 74–99)
HCT VFR BLD CALC: 43.3 % (ref 34–48)
HEMOGLOBIN: 14.3 G/DL (ref 11.5–15.5)
IMMATURE GRANULOCYTES #: 0.01 E9/L
IMMATURE GRANULOCYTES %: 0.2 % (ref 0–5)
LYMPHOCYTES ABSOLUTE: 1.17 E9/L (ref 1.5–4)
LYMPHOCYTES RELATIVE PERCENT: 24.8 % (ref 20–42)
MCH RBC QN AUTO: 29.2 PG (ref 26–35)
MCHC RBC AUTO-ENTMCNC: 33 % (ref 32–34.5)
MCV RBC AUTO: 88.5 FL (ref 80–99.9)
MONOCYTES ABSOLUTE: 0.38 E9/L (ref 0.1–0.95)
MONOCYTES RELATIVE PERCENT: 8.1 % (ref 2–12)
NEUTROPHILS ABSOLUTE: 3.01 E9/L (ref 1.8–7.3)
NEUTROPHILS RELATIVE PERCENT: 63.9 % (ref 43–80)
PDW BLD-RTO: 12.5 FL (ref 11.5–15)
PLATELET # BLD: 183 E9/L (ref 130–450)
PMV BLD AUTO: 10.3 FL (ref 7–12)
POTASSIUM SERPL-SCNC: 5 MMOL/L (ref 3.5–5)
RBC # BLD: 4.89 E12/L (ref 3.5–5.5)
SODIUM BLD-SCNC: 135 MMOL/L (ref 132–146)
TOTAL PROTEIN: 7.5 G/DL (ref 6.4–8.3)
WBC # BLD: 4.7 E9/L (ref 4.5–11.5)

## 2023-02-23 NOTE — PROGRESS NOTES
23  Courtney Hdz : 1940 Sex: female  Age 82 y.o.    Subjective:  Chief Complaint   Patient presents with    Leg Pain     Both legs are cramping in calves started three days ago       HPI:   Courtney Hdz , 82 y.o. female presents to the clinic for evaluation of bilateral calf pain x 3 days. The patient has not taken any treatment for symptoms. The patient reports unchanged symptoms over time.  The patient is concerned about possible blood clots today. The patient denies hx of cancer, prolonged standing / walking, and hx of clotting disorder. The patient denies edema, erythema, and warmth. The patient also denies headache, fever, chest pain, abdominal pain, shortness of breath, and nausea / vomiting / diarrhea.    ROS:   Unless otherwise stated in this report the patient's positive and negative responses for review of systems for constitutional, eyes, ENT, cardiovascular, respiratory, gastrointestinal, neurological, , musculoskeletal, and integument systems and related systems to the presenting problem are either stated in the history of present illness or were not pertinent or were negative for the symptoms and/or complaints related to the presenting medical problem.  Positives and pertinent negatives as per HPI.  All others reviewed and are negative.      PMH:     Past Medical History:   Diagnosis Date    Diverticulosis     Hemorrhoids, internal     Irritable bowel syndrome     Osteoarthritis     rt knee    Osteoporosis     Primary hypertension 3/1/2022    Seizures (HCC)     complex partial  last seizure ?    Urticaria, chronic        Past Surgical History:   Procedure Laterality Date    APPENDECTOMY      CARPAL TUNNEL RELEASE Left     CHOLECYSTECTOMY      COLONOSCOPY      FINGER TRIGGER RELEASE Left     HC INJECT OTHER PERPHRL NERV Right 10/8/2019    RIGHT HIP INJECTION (CPT 87672) performed by Devyn Pugh MD at [facility] OR    HC INJECTION PROCEDURE FOR SACROILIAC JOINT Right  9/19/2019    RIGHT SACROILIAC JOINT INJECTION performed by Carlos Bo MD at [address] [place] [address] Right 10/31/2014    Laparoscopic right inguinal hernia repair with mesh     HYSTERECTOMY (CERVIX STATUS UNKNOWN)      vaginal    KNEE ARTHROSCOPY      right    NERVE BLOCK Right 09/19/2019    sacroiliac joint    NERVE BLOCK Right 10/08/2019    hip injection    TONSILLECTOMY AND ADENOIDECTOMY      TOTAL HIP ARTHROPLASTY Right 01/2020       Family History   Problem Relation Age of Onset    Coronary Art Dis Mother     Coronary Art Dis Father     Coronary Art Dis Brother     Cancer Brother         lymph nodes and lung    Diabetes Maternal Aunt        Medications:     Current Outpatient Medications:     diazePAM (VALIUM) 5 MG tablet, Take 1 tablet by mouth nightly as needed for Sleep., Disp: 60 tablet, Rfl: 3    busPIRone (BUSPAR) 10 MG tablet, Take 1 tablet by mouth 2 times daily as needed (anxiety), Disp: 60 tablet, Rfl: 3    metoprolol succinate (TOPROL XL) 50 MG extended release tablet, Take 1 tablet by mouth daily, Disp: 30 tablet, Rfl: 5    atorvastatin (LIPITOR) 40 MG tablet, Take 1 tablet by mouth nightly, Disp: 90 tablet, Rfl: 0    amiodarone (CORDARONE) 200 MG tablet, Take 200 mg by mouth daily, Disp: , Rfl:     cetirizine (ZYRTEC) 10 MG tablet, Take 10 mg by mouth every 12 hours, Disp: , Rfl:     clobetasol (TEMOVATE) 0.05 % cream, APPLY CREAM TOPICALLY TWICE DAILY AS NEEDED FOR FLARES TO BODY, Disp: , Rfl:     acetaminophen (TYLENOL) 325 MG tablet, Take 650 mg by mouth every 6 hours as needed for Pain, Disp: , Rfl:     b complex vitamins capsule, Take 1 capsule by mouth daily, Disp: , Rfl:     melatonin 3 MG TABS tablet, Take 3 mg by mouth nightly , Disp: , Rfl:     lamoTRIgine (LAMICTAL) 100 MG tablet, Take 1 tablet by mouth 3 times daily, Disp: 90 tablet, Rfl: 0    primidone (MYSOLINE) 250 MG tablet, Take 250 mg by mouth 3 times daily. , Disp: , Rfl:     gabapentin (NEURONTIN) 100 MG capsule, Take 2 capsules QHS for three days then 2 capsules BID, Disp: 120 capsule, Rfl: 0    meloxicam (MOBIC) 7.5 MG tablet, Take 1 tablet by mouth daily, Disp: 30 tablet, Rfl: 0    Allergies: Allergies   Allergen Reactions    Penicillins Anaphylaxis and Swelling    Ace Inhibitors Hives    Dextromethorphan Hives    Factive [Gemifloxacin Mesylate] Other (See Comments)     \"Unknown\"     Mucinex Dm [Dm-Guaifenesin Er] Hives    Shellfish-Derived Products Hives and Swelling     Shrimp       Social History:     Social History     Tobacco Use    Smoking status: Never    Smokeless tobacco: Never   Substance Use Topics    Alcohol use: No     Comment: rarely    Drug use: No       Physical Exam:     Vitals:    02/23/23 1139 02/23/23 1144 02/23/23 1217   BP: (!) 205/98 (!) 201/78 (!) 162/96   Site: Left Upper Arm     Position: Sitting     Cuff Size: Large Adult     Pulse: 73 76    Resp: 17     Temp: 97.4 °F (36.3 °C)     TempSrc: Temporal     SpO2: 98%     Weight: 128 lb (58.1 kg)     Height: 5' 4\" (1.626 m)         Physical Exam (PE)    Physical Exam  Constitutional:       Appearance: Normal appearance. HENT:      Head: Normocephalic. Right Ear: External ear normal.      Left Ear: External ear normal.      Nose: Nose normal.      Mouth/Throat:      Mouth: Mucous membranes are moist.      Pharynx: Oropharynx is clear. Eyes:      Pupils: Pupils are equal, round, and reactive to light. Cardiovascular:      Rate and Rhythm: Normal rate and regular rhythm. Pulses:           Dorsalis pedis pulses are 1+ on the right side and 1+ on the left side. Heart sounds: Normal heart sounds. Pulmonary:      Effort: Pulmonary effort is normal.      Breath sounds: Normal breath sounds. Abdominal:      General: Bowel sounds are normal.      Palpations: Abdomen is soft. Musculoskeletal:         General: Normal range of motion. Cervical back: Normal range of motion and neck supple. Right lower leg: No edema.       Left lower leg: No edema. Comments: Positive bilateral Homans' sign. Skin:     General: Skin is warm and dry. Capillary Refill: Capillary refill takes less than 2 seconds. Comments: Bilateral Lower extremities: Positive varicose veins. No erythema, edema, or warmth. Neurological:      General: No focal deficit present. Mental Status: She is alert and oriented to person, place, and time. Psychiatric:         Mood and Affect: Mood normal.         Behavior: Behavior normal.        Testing:   (All laboratory and radiology results have been personally reviewed by myself)  Labs:  No results found for this visit on 02/23/23. Imaging: All Radiology results interpreted by Radiologist unless otherwise noted. US DUP LOWER EXTREMITIES BILATERAL VENOUS    (Results Pending)     Wells' Score Criteria for DVT: (possible score ? 2 to 9)      Active cancer (treatment within last 6 months or palliative) no (0)     Calf swelling ? 3 cm compared to asymptomatic calf (measured 10 cm             below tibial tuberosity) no (0)     Swollen unilateral superficial veins (non-varicose, in symptomatic leg) no (0)     Unilateral pitting edema (in symptomatic leg): no (0)     Previous documented DVT: no (0)     Swelling of entire leg no (0)      Localized tenderness along the deep venous system yes (1)     Paralysis, paresis, or recent cast immobilization of lower extremities no (0)     Recently bedridden ? 3 days, or major surgery requiring regional or                    general anesthetic in the past 12 weeks no (0)     Alternative diagnosis at least as likely no (0)     TOTAL SCORE 1     * Those with Wells scores of two or more have a 28% chance of having DVT, those with a lower score have 6% odds. ** Alternatively, Wells scores can be categorized as high if greater than two, moderate if one or two, and low if less than one, with likelihoods of 53%, 17%, and 5%     respectively.    Assessment / Plan:   The patient's vitals, allergies, medications, and past medical history have been reviewed. Elisha Phillips was seen today for leg pain. Diagnoses and all orders for this visit:    Bilateral leg pain  -     CBC with Auto Differential; Future  -     Comprehensive Metabolic Panel; Future  -     US DUP LOWER EXTREMITIES BILATERAL VENOUS; Future      - Disposition: Home    - Educational material printed for patient's review and were included in patient instructions. After Visit Summary and given to patient at the end of visit. - Discussed symptomatic treatments with the patient today. The patient is to follow-up with PCP in the next 2-3 days for reevaluation. Red flag symptoms were also discussed with the patient today. If symptoms worsen the patient is to go directly to the emergency department for reevaluation and treatment. Pt verbalizes understanding and is in agreement with plan of care. All questions answered. SIGNATURE: EDUARDA Anton-CNP    *NOTE: This report was transcribed using voice recognition software. Every effort was made to ensure accuracy; however, inadvertent computerized transcription errors may be present.

## 2023-03-02 ENCOUNTER — OFFICE VISIT (OUTPATIENT)
Dept: FAMILY MEDICINE CLINIC | Age: 83
End: 2023-03-02
Payer: MEDICARE

## 2023-03-02 VITALS
HEIGHT: 64 IN | OXYGEN SATURATION: 98 % | WEIGHT: 129 LBS | SYSTOLIC BLOOD PRESSURE: 138 MMHG | BODY MASS INDEX: 22.02 KG/M2 | RESPIRATION RATE: 20 BRPM | DIASTOLIC BLOOD PRESSURE: 78 MMHG | HEART RATE: 78 BPM

## 2023-03-02 DIAGNOSIS — F41.1 GENERALIZED ANXIETY DISORDER: ICD-10-CM

## 2023-03-02 DIAGNOSIS — I10 PRIMARY HYPERTENSION: ICD-10-CM

## 2023-03-02 DIAGNOSIS — S86.812D STRAIN OF CALF MUSCLE, LEFT, SUBSEQUENT ENCOUNTER: Primary | ICD-10-CM

## 2023-03-02 DIAGNOSIS — F13.20 SEDATIVE, HYPNOTIC OR ANXIOLYTIC DEPENDENCE, UNCOMPLICATED (HCC): ICD-10-CM

## 2023-03-02 PROCEDURE — G8420 CALC BMI NORM PARAMETERS: HCPCS | Performed by: FAMILY MEDICINE

## 2023-03-02 PROCEDURE — G8484 FLU IMMUNIZE NO ADMIN: HCPCS | Performed by: FAMILY MEDICINE

## 2023-03-02 PROCEDURE — 3075F SYST BP GE 130 - 139MM HG: CPT | Performed by: FAMILY MEDICINE

## 2023-03-02 PROCEDURE — 1036F TOBACCO NON-USER: CPT | Performed by: FAMILY MEDICINE

## 2023-03-02 PROCEDURE — 99214 OFFICE O/P EST MOD 30 MIN: CPT | Performed by: FAMILY MEDICINE

## 2023-03-02 PROCEDURE — G8427 DOCREV CUR MEDS BY ELIG CLIN: HCPCS | Performed by: FAMILY MEDICINE

## 2023-03-02 PROCEDURE — 1123F ACP DISCUSS/DSCN MKR DOCD: CPT | Performed by: FAMILY MEDICINE

## 2023-03-02 PROCEDURE — 3078F DIAST BP <80 MM HG: CPT | Performed by: FAMILY MEDICINE

## 2023-03-02 PROCEDURE — G8399 PT W/DXA RESULTS DOCUMENT: HCPCS | Performed by: FAMILY MEDICINE

## 2023-03-02 PROCEDURE — 1090F PRES/ABSN URINE INCON ASSESS: CPT | Performed by: FAMILY MEDICINE

## 2023-03-02 RX ORDER — DIAZEPAM 5 MG/1
5 TABLET ORAL NIGHTLY PRN
Qty: 60 TABLET | Refills: 3
Start: 2023-03-02 | End: 2024-03-01

## 2023-03-02 RX ORDER — METOPROLOL SUCCINATE 50 MG/1
50 TABLET, EXTENDED RELEASE ORAL DAILY
Qty: 30 TABLET | Refills: 5
Start: 2023-03-02

## 2023-03-02 SDOH — ECONOMIC STABILITY: HOUSING INSECURITY
IN THE LAST 12 MONTHS, WAS THERE A TIME WHEN YOU DID NOT HAVE A STEADY PLACE TO SLEEP OR SLEPT IN A SHELTER (INCLUDING NOW)?: PATIENT REFUSED

## 2023-03-02 SDOH — ECONOMIC STABILITY: FOOD INSECURITY: WITHIN THE PAST 12 MONTHS, YOU WORRIED THAT YOUR FOOD WOULD RUN OUT BEFORE YOU GOT MONEY TO BUY MORE.: PATIENT DECLINED

## 2023-03-02 SDOH — ECONOMIC STABILITY: FOOD INSECURITY: WITHIN THE PAST 12 MONTHS, THE FOOD YOU BOUGHT JUST DIDN'T LAST AND YOU DIDN'T HAVE MONEY TO GET MORE.: PATIENT DECLINED

## 2023-03-02 SDOH — ECONOMIC STABILITY: INCOME INSECURITY: HOW HARD IS IT FOR YOU TO PAY FOR THE VERY BASICS LIKE FOOD, HOUSING, MEDICAL CARE, AND HEATING?: PATIENT DECLINED

## 2023-03-02 ASSESSMENT — ENCOUNTER SYMPTOMS
DIARRHEA: 0
NAUSEA: 0
VOMITING: 0
SHORTNESS OF BREATH: 0

## 2023-03-02 NOTE — PROGRESS NOTES
[address] [facility], [address]   [address] [facility]   Collin Galloway MD     Patient: Gerardo Hatch Birth: 1940  Visit Date: 3/2/23    Rafa Arango is a 81y.o. year old female here today for   Chief Complaint   Patient presents with    Leg Pain     Left , had US       HPI  Leg Pain   The incident occurred 5 to 7 days ago. There was no injury mechanism. The pain is present in the left leg. The quality of the pain is described as cramping (pinching sensation). The pain is mild. The pain has been Intermittent since onset. Associated symptoms include tingling (left ankle). The symptoms are aggravated by weight bearing. She has tried acetaminophen for the symptoms. The treatment provided significant relief. Patient has been dealing with left ankle pain and injury and admits to walking differently on left leg due to ankle problems. Needs either left ankle replacement or fusion. Recent left Lower extremity duplex ultrasound results reviewed with patient which are not remarkable. Patient has history of benzodiazepine dependence for control of anxiety and insomnia. Patient doing well on current regimen for hypertension. Recent lab results reviewed, including BMP, CBC are not remarkable. Review of Systems   Respiratory:  Negative for shortness of breath. Cardiovascular:  Negative for chest pain, palpitations and leg swelling. Gastrointestinal:  Negative for diarrhea, nausea and vomiting. Skin:  Negative for rash. Neurological:  Positive for tingling (left ankle). Psychiatric/Behavioral:  Negative for dysphoric mood. Past medical, surgical, social and/or family historyreviewed, updated as needed, and are non-contributory (unless otherwise stated). Medications, allergies, and problem list also reviewed and updated as needed in patient's record.      Current Outpatient Medications   Medication Sig Dispense Refill    diazePAM (VALIUM) 5 MG tablet Take 1 tablet by mouth nightly as needed for Sleep. 60 tablet 3    metoprolol succinate (TOPROL XL) 50 MG extended release tablet Take 1 tablet by mouth daily 30 tablet 5    busPIRone (BUSPAR) 10 MG tablet Take 1 tablet by mouth 2 times daily as needed (anxiety) 60 tablet 3    atorvastatin (LIPITOR) 40 MG tablet Take 1 tablet by mouth nightly 90 tablet 0    cetirizine (ZYRTEC) 10 MG tablet Take 10 mg by mouth every 12 hours      clobetasol (TEMOVATE) 0.05 % cream APPLY CREAM TOPICALLY TWICE DAILY AS NEEDED FOR FLARES TO BODY      acetaminophen (TYLENOL) 325 MG tablet Take 650 mg by mouth every 6 hours as needed for Pain      b complex vitamins capsule Take 1 capsule by mouth daily      melatonin 3 MG TABS tablet Take 3 mg by mouth nightly       lamoTRIgine (LAMICTAL) 100 MG tablet Take 1 tablet by mouth 3 times daily 90 tablet 0    primidone (MYSOLINE) 250 MG tablet Take 250 mg by mouth 3 times daily. gabapentin (NEURONTIN) 100 MG capsule Take 2 capsules QHS for three days then 2 capsules  capsule 0     No current facility-administered medications for this visit. Wt Readings from Last 3 Encounters:   03/02/23 129 lb (58.5 kg)   02/23/23 128 lb (58.1 kg)   01/11/23 128 lb (58.1 kg)                   Vitals:    03/02/23 0829   BP: 138/78   Pulse: 78   Resp: 20   SpO2: 98%       Physical Exam  Vitals reviewed. Constitutional:       Appearance: She is well-developed. Cardiovascular:      Rate and Rhythm: Normal rate and regular rhythm. Heart sounds: Normal heart sounds. No murmur heard. No friction rub. Pulmonary:      Effort: Pulmonary effort is normal. No respiratory distress. Breath sounds: Normal breath sounds. No wheezing or rales. Abdominal:      General: Bowel sounds are normal. There is no distension. Palpations: Abdomen is soft. Tenderness: There is no abdominal tenderness. There is no guarding or rebound. Skin:     General: Skin is warm and dry. Neurological:      Mental Status: She is alert. ASSESSMENT/PLAN  Nato Jackson was seen today for leg pain. Diagnoses and all orders for this visit:    Strain of calf muscle, left, subsequent encounter        -     calf strain exercise handout provided in AVS        -     patient encouraged to massage the muscle daily    Primary hypertension  -     metoprolol succinate (TOPROL XL) 50 MG extended release tablet; Take 1 tablet by mouth daily    Sedative, hypnotic or anxiolytic dependence, uncomplicated  -     diazePAM (VALIUM) 5 MG tablet; Take 1 tablet by mouth nightly as needed for Sleep. -     Stable; will assess yearly    Generalized anxiety disorder  -     diazePAM (VALIUM) 5 MG tablet; Take 1 tablet by mouth nightly as needed for Sleep.        Chalo Noel follow up if tests abnormal.    Return for scheduled appointment. or sooner if necessary. I have reviewed my findings and recommendations with Nato Jackson.      Krissy Buck MD, M.D

## 2023-04-21 ENCOUNTER — TELEPHONE (OUTPATIENT)
Dept: FAMILY MEDICINE CLINIC | Age: 83
End: 2023-04-21

## 2023-04-24 NOTE — TELEPHONE ENCOUNTER
Please advise patient to take an extra metoprolol 50 mg if systolic blood pressure greater than 150. We will reassess her blood pressure in the office at her upcoming appointment.

## 2023-04-26 ENCOUNTER — OFFICE VISIT (OUTPATIENT)
Dept: FAMILY MEDICINE CLINIC | Age: 83
End: 2023-04-26

## 2023-04-26 VITALS
WEIGHT: 129 LBS | DIASTOLIC BLOOD PRESSURE: 82 MMHG | OXYGEN SATURATION: 98 % | HEIGHT: 64 IN | BODY MASS INDEX: 22.02 KG/M2 | SYSTOLIC BLOOD PRESSURE: 180 MMHG | HEART RATE: 68 BPM | RESPIRATION RATE: 20 BRPM

## 2023-04-26 DIAGNOSIS — R00.2 PALPITATIONS: ICD-10-CM

## 2023-04-26 DIAGNOSIS — I10 PRIMARY HYPERTENSION: Primary | ICD-10-CM

## 2023-04-26 RX ORDER — METOPROLOL SUCCINATE 100 MG/1
100 TABLET, EXTENDED RELEASE ORAL DAILY
Qty: 90 TABLET | Refills: 1 | Status: SHIPPED | OUTPATIENT
Start: 2023-04-26

## 2023-04-26 ASSESSMENT — ENCOUNTER SYMPTOMS
SHORTNESS OF BREATH: 0
VOMITING: 0
DIARRHEA: 0
NAUSEA: 0

## 2023-04-26 NOTE — PROGRESS NOTES
Chief Complaint   Patient presents with    Hypertension     Went to ER [facility] blood pressure running high, wants referral to cardiologist       Patient is here for follow up for hypertension    Hypertension:  Patient is here for follow up chronic hypertension. This is not generally controlled on current medication regimen. BP today is 180/82--running higher. Patient recently went to ER at [facility] and they changed her metoprolol to 25 mg 3 times daily, instead of the metoprolol XL 50 mg dose she had been taking. Takes meds as directed and tolerates them well. Most recent labs reviewed with patient, including CMP, CBC, and TSH, and are not remarkable. No symptoms from htn standpoint per ROS. Patientis  compliant with lifestyle modifications. Patient does not smoke. Comorbid conditions include anxiety. Patient's past medical, surgical, social and/or family history reviewed, updated inchart, and are non-contributory (unless otherwise stated). Medications and allergies also reviewed and updated in chart. Current Outpatient Medications   Medication Sig Dispense Refill    metoprolol succinate (TOPROL XL) 100 MG extended release tablet Take 1 tablet by mouth daily 90 tablet 1    diazePAM (VALIUM) 5 MG tablet Take 1 tablet by mouth nightly as needed for Sleep. 60 tablet 3    busPIRone (BUSPAR) 10 MG tablet Take 1 tablet by mouth 2 times daily as needed (anxiety) 60 tablet 3    atorvastatin (LIPITOR) 40 MG tablet Take 1 tablet by mouth nightly 90 tablet 0    cetirizine (ZYRTEC) 10 MG tablet Take 1 tablet by mouth in the morning and 1 tablet in the evening.       clobetasol (TEMOVATE) 0.05 % cream APPLY CREAM TOPICALLY TWICE DAILY AS NEEDED FOR FLARES TO BODY      acetaminophen (TYLENOL) 325 MG tablet Take 2 tablets by mouth every 6 hours as needed for Pain      b complex vitamins capsule Take 1 capsule by mouth daily      melatonin 3 MG TABS tablet Take 1 tablet by mouth nightly      lamoTRIgine (LAMICTAL)

## 2023-05-01 DIAGNOSIS — I63.9 ACUTE ISCHEMIC STROKE (HCC): ICD-10-CM

## 2023-05-02 RX ORDER — ATORVASTATIN CALCIUM 40 MG/1
40 TABLET, FILM COATED ORAL NIGHTLY
Qty: 90 TABLET | Refills: 1 | Status: SHIPPED | OUTPATIENT
Start: 2023-05-02

## 2023-05-31 ENCOUNTER — OFFICE VISIT (OUTPATIENT)
Dept: FAMILY MEDICINE CLINIC | Age: 83
End: 2023-05-31

## 2023-05-31 VITALS
WEIGHT: 127 LBS | BODY MASS INDEX: 21.68 KG/M2 | SYSTOLIC BLOOD PRESSURE: 140 MMHG | HEART RATE: 59 BPM | OXYGEN SATURATION: 96 % | RESPIRATION RATE: 20 BRPM | HEIGHT: 64 IN | DIASTOLIC BLOOD PRESSURE: 78 MMHG

## 2023-05-31 DIAGNOSIS — I10 PRIMARY HYPERTENSION: Primary | ICD-10-CM

## 2023-05-31 RX ORDER — AMLODIPINE BESYLATE 5 MG/1
5 TABLET ORAL DAILY
Qty: 90 TABLET | OUTPATIENT
Start: 2023-05-31

## 2023-05-31 RX ORDER — AMLODIPINE BESYLATE 5 MG/1
5 TABLET ORAL DAILY
Qty: 30 TABLET | Refills: 3 | Status: SHIPPED | OUTPATIENT
Start: 2023-05-31

## 2023-05-31 ASSESSMENT — ENCOUNTER SYMPTOMS
NAUSEA: 0
VOMITING: 0
DIARRHEA: 0
SHORTNESS OF BREATH: 0

## 2023-06-04 RX ORDER — METOPROLOL SUCCINATE 100 MG/1
100 TABLET, EXTENDED RELEASE ORAL DAILY
Qty: 90 TABLET | Refills: 1 | Status: SHIPPED
Start: 2023-06-04

## 2023-06-26 ENCOUNTER — OFFICE VISIT (OUTPATIENT)
Dept: FAMILY MEDICINE CLINIC | Age: 83
End: 2023-06-26
Payer: MEDICARE

## 2023-06-26 VITALS
HEART RATE: 68 BPM | DIASTOLIC BLOOD PRESSURE: 74 MMHG | OXYGEN SATURATION: 97 % | BODY MASS INDEX: 23.37 KG/M2 | HEIGHT: 62 IN | RESPIRATION RATE: 20 BRPM | SYSTOLIC BLOOD PRESSURE: 138 MMHG | WEIGHT: 127 LBS

## 2023-06-26 DIAGNOSIS — F41.1 GENERALIZED ANXIETY DISORDER: ICD-10-CM

## 2023-06-26 DIAGNOSIS — I10 PRIMARY HYPERTENSION: Primary | ICD-10-CM

## 2023-06-26 PROCEDURE — G8420 CALC BMI NORM PARAMETERS: HCPCS | Performed by: FAMILY MEDICINE

## 2023-06-26 PROCEDURE — 3074F SYST BP LT 130 MM HG: CPT | Performed by: FAMILY MEDICINE

## 2023-06-26 PROCEDURE — 1090F PRES/ABSN URINE INCON ASSESS: CPT | Performed by: FAMILY MEDICINE

## 2023-06-26 PROCEDURE — 1123F ACP DISCUSS/DSCN MKR DOCD: CPT | Performed by: FAMILY MEDICINE

## 2023-06-26 PROCEDURE — 99214 OFFICE O/P EST MOD 30 MIN: CPT | Performed by: FAMILY MEDICINE

## 2023-06-26 PROCEDURE — G8399 PT W/DXA RESULTS DOCUMENT: HCPCS | Performed by: FAMILY MEDICINE

## 2023-06-26 PROCEDURE — 3078F DIAST BP <80 MM HG: CPT | Performed by: FAMILY MEDICINE

## 2023-06-26 PROCEDURE — 1036F TOBACCO NON-USER: CPT | Performed by: FAMILY MEDICINE

## 2023-06-26 PROCEDURE — G8427 DOCREV CUR MEDS BY ELIG CLIN: HCPCS | Performed by: FAMILY MEDICINE

## 2023-06-26 RX ORDER — DIAZEPAM 5 MG/1
5 TABLET ORAL 2 TIMES DAILY PRN
Qty: 60 TABLET | Refills: 3 | Status: SHIPPED | OUTPATIENT
Start: 2023-06-26 | End: 2024-06-25

## 2023-06-27 RX ORDER — METOPROLOL SUCCINATE 100 MG/1
100 TABLET, EXTENDED RELEASE ORAL DAILY
Qty: 90 TABLET | Refills: 1
Start: 2023-06-27

## 2023-06-27 RX ORDER — AMLODIPINE BESYLATE 5 MG/1
5 TABLET ORAL DAILY
Qty: 30 TABLET | Refills: 3
Start: 2023-06-27

## 2023-07-01 DIAGNOSIS — I10 PRIMARY HYPERTENSION: ICD-10-CM

## 2023-07-03 RX ORDER — AMLODIPINE BESYLATE 5 MG/1
5 TABLET ORAL DAILY
Qty: 90 TABLET | OUTPATIENT
Start: 2023-07-03

## 2023-07-29 DIAGNOSIS — I10 PRIMARY HYPERTENSION: ICD-10-CM

## 2023-08-01 RX ORDER — METOPROLOL SUCCINATE 100 MG/1
100 TABLET, EXTENDED RELEASE ORAL DAILY
Qty: 90 TABLET | Refills: 1 | Status: SHIPPED | OUTPATIENT
Start: 2023-08-01

## 2023-08-30 ENCOUNTER — TELEPHONE (OUTPATIENT)
Dept: FAMILY MEDICINE CLINIC | Age: 83
End: 2023-08-30

## 2023-08-30 NOTE — TELEPHONE ENCOUNTER
----- Message from [facility] sent at 8/30/2023  2:07 PM EDT -----  Subject: Message to Provider    QUESTIONS  Information for Provider? Pt missed appt today because her  was   taken to hospital last night. She doesn't drive but will call back to   reschedule but she doesn't know when as he had a stroke.   ---------------------------------------------------------------------------  --------------  Vani AVENDANO  [phone]; OK to leave message on voicemail  ---------------------------------------------------------------------------  --------------  SCRIPT ANSWERS  Relationship to Patient?  Self

## 2023-10-02 ENCOUNTER — TELEPHONE (OUTPATIENT)
Dept: FAMILY MEDICINE CLINIC | Age: 83
End: 2023-10-02

## 2023-10-02 NOTE — TELEPHONE ENCOUNTER
----- Message from Ander Segura sent at 10/2/2023  2:00 PM EDT -----  Subject: Appointment Request    Reason for Call: Established Patient Appointment needed: Routine Medicare   AWV    QUESTIONS    Reason for appointment request? No appointments available during search     Additional Information for Provider? need appt  was in hospital   missed last appointment, will need refills in next week or two can't come   in 4th, 6th or 18th 23rd & 24th   ---------------------------------------------------------------------------  --------------  Miquel Howard TDF  [phone]; OK to leave message on voicemail  ---------------------------------------------------------------------------  --------------  SCRIPT ANSWERS

## 2023-10-03 ENCOUNTER — OFFICE VISIT (OUTPATIENT)
Dept: FAMILY MEDICINE CLINIC | Age: 83
End: 2023-10-03
Payer: MEDICARE

## 2023-10-03 VITALS
RESPIRATION RATE: 20 BRPM | WEIGHT: 126 LBS | OXYGEN SATURATION: 98 % | SYSTOLIC BLOOD PRESSURE: 168 MMHG | HEART RATE: 73 BPM | DIASTOLIC BLOOD PRESSURE: 78 MMHG | BODY MASS INDEX: 23.19 KG/M2 | HEIGHT: 62 IN

## 2023-10-03 DIAGNOSIS — F41.1 GENERALIZED ANXIETY DISORDER: ICD-10-CM

## 2023-10-03 DIAGNOSIS — I10 PRIMARY HYPERTENSION: Primary | ICD-10-CM

## 2023-10-03 DIAGNOSIS — Z23 NEED FOR PROPHYLACTIC VACCINATION AND INOCULATION AGAINST INFLUENZA: ICD-10-CM

## 2023-10-03 PROCEDURE — G8427 DOCREV CUR MEDS BY ELIG CLIN: HCPCS | Performed by: FAMILY MEDICINE

## 2023-10-03 PROCEDURE — 90694 VACC AIIV4 NO PRSRV 0.5ML IM: CPT | Performed by: FAMILY MEDICINE

## 2023-10-03 PROCEDURE — 1090F PRES/ABSN URINE INCON ASSESS: CPT | Performed by: FAMILY MEDICINE

## 2023-10-03 PROCEDURE — 1123F ACP DISCUSS/DSCN MKR DOCD: CPT | Performed by: FAMILY MEDICINE

## 2023-10-03 PROCEDURE — 1036F TOBACCO NON-USER: CPT | Performed by: FAMILY MEDICINE

## 2023-10-03 PROCEDURE — G8399 PT W/DXA RESULTS DOCUMENT: HCPCS | Performed by: FAMILY MEDICINE

## 2023-10-03 PROCEDURE — 99214 OFFICE O/P EST MOD 30 MIN: CPT | Performed by: FAMILY MEDICINE

## 2023-10-03 PROCEDURE — 3074F SYST BP LT 130 MM HG: CPT | Performed by: FAMILY MEDICINE

## 2023-10-03 PROCEDURE — G8420 CALC BMI NORM PARAMETERS: HCPCS | Performed by: FAMILY MEDICINE

## 2023-10-03 PROCEDURE — G0008 ADMIN INFLUENZA VIRUS VAC: HCPCS | Performed by: FAMILY MEDICINE

## 2023-10-03 PROCEDURE — 3078F DIAST BP <80 MM HG: CPT | Performed by: FAMILY MEDICINE

## 2023-10-03 PROCEDURE — G8484 FLU IMMUNIZE NO ADMIN: HCPCS | Performed by: FAMILY MEDICINE

## 2023-10-03 RX ORDER — VALSARTAN 160 MG/1
160 TABLET ORAL DAILY
COMMUNITY
Start: 2023-09-05 | End: 2023-10-05 | Stop reason: DRUGHIGH

## 2023-10-03 RX ORDER — AMLODIPINE BESYLATE 10 MG/1
10 TABLET ORAL DAILY
Qty: 90 TABLET | Refills: 1 | Status: SHIPPED | OUTPATIENT
Start: 2023-10-03

## 2023-10-03 RX ORDER — DIAZEPAM 5 MG/1
5 TABLET ORAL 2 TIMES DAILY PRN
Qty: 90 TABLET | Refills: 1 | Status: SHIPPED | OUTPATIENT
Start: 2023-10-03 | End: 2024-10-02

## 2023-10-03 RX ORDER — ATORVASTATIN CALCIUM 40 MG/1
40 TABLET, FILM COATED ORAL NIGHTLY
Qty: 90 TABLET | Refills: 1 | Status: SHIPPED | OUTPATIENT
Start: 2023-10-03

## 2023-10-03 ASSESSMENT — ENCOUNTER SYMPTOMS
VOMITING: 0
DIARRHEA: 0
SHORTNESS OF BREATH: 1
NAUSEA: 0

## 2023-10-03 NOTE — PROGRESS NOTES
for this visit:    Primary hypertension  -     amLODIPine (NORVASC) 10 MG tablet; Take 1 tablet by mouth daily  -     valsartan (DIOVAN) 160 MG tablet; Take 1 tablet by mouth daily    Generalized anxiety disorder  -     diazePAM (VALIUM) 5 MG tablet; Take 1 tablet by mouth 2 times daily as needed for Sleep or Anxiety. Max Daily Amount: 10 mg    Need for prophylactic vaccination and inoculation against influenza  -     Influenza, FLUAD, (age 72 y+), IM, Preservative Free, 0.5 mL          Phone/MyChart follow up iftests abnormal.    Return in about 1 month (around 11/3/2023) for Annual Medicare Wellness Visit--30 minutes. , or sooner if necessary. Elizabeth Iraheta MD     Educational materials and/or home exercises printed for patient's review and wereincluded in patient instructions on his/her After Visit Summary and given to patient at the end of visit. Counseled regarding above diagnosis, including possible risks andcomplications,  especially if left uncontrolled. Counseled regarding the possible side effects, risks, benefits and alternatives to treatment; patient and/or guardian verbalizes understanding, agrees, feelscomfortable with and wishes to proceed with above treatment plan. Advised patient to call with any new medication issues, and read all Rx info from pharmacy to assure aware of all possible risks and side effects ofmedication before taking. Reviewed age and gender appropriate health screening exams and vaccinations. Advised patient regarding importance of keeping up with recommended health maintenance and to schedule as soonas possible if overdue, as this is important in assessing for undiagnosed pathology, especially cancer, as well as protecting against potentially harmful/life threatening disease. Patient and/or guardianverbalizes understanding and agrees with above counseling, assessment and plan. All questions answered.

## 2023-10-05 RX ORDER — VALSARTAN 160 MG/1
160 TABLET ORAL DAILY
Qty: 30 TABLET | Refills: 0 | Status: SHIPPED
Start: 2023-10-05

## 2023-11-07 ENCOUNTER — OFFICE VISIT (OUTPATIENT)
Dept: FAMILY MEDICINE CLINIC | Age: 83
End: 2023-11-07

## 2023-11-07 VITALS
HEART RATE: 64 BPM | RESPIRATION RATE: 20 BRPM | BODY MASS INDEX: 23.37 KG/M2 | DIASTOLIC BLOOD PRESSURE: 84 MMHG | WEIGHT: 127 LBS | HEIGHT: 62 IN | OXYGEN SATURATION: 96 % | SYSTOLIC BLOOD PRESSURE: 164 MMHG

## 2023-11-07 DIAGNOSIS — I10 PRIMARY HYPERTENSION: ICD-10-CM

## 2023-11-07 DIAGNOSIS — Z00.00 MEDICARE ANNUAL WELLNESS VISIT, SUBSEQUENT: Primary | ICD-10-CM

## 2023-11-07 DIAGNOSIS — F41.1 GENERALIZED ANXIETY DISORDER: ICD-10-CM

## 2023-11-07 RX ORDER — BUSPIRONE HYDROCHLORIDE 10 MG/1
10 TABLET ORAL 2 TIMES DAILY PRN
Qty: 60 TABLET | Refills: 5 | Status: SHIPPED | OUTPATIENT
Start: 2023-11-07

## 2023-11-07 RX ORDER — VALSARTAN 160 MG/1
160 TABLET ORAL DAILY
Qty: 90 TABLET | Refills: 1 | Status: SHIPPED | OUTPATIENT
Start: 2023-11-07

## 2023-11-07 ASSESSMENT — PATIENT HEALTH QUESTIONNAIRE - PHQ9
SUM OF ALL RESPONSES TO PHQ QUESTIONS 1-9: 1
1. LITTLE INTEREST OR PLEASURE IN DOING THINGS: 1
SUM OF ALL RESPONSES TO PHQ9 QUESTIONS 1 & 2: 1
2. FEELING DOWN, DEPRESSED OR HOPELESS: 0
SUM OF ALL RESPONSES TO PHQ QUESTIONS 1-9: 1

## 2023-11-07 ASSESSMENT — LIFESTYLE VARIABLES: HOW OFTEN DO YOU HAVE A DRINK CONTAINING ALCOHOL: NEVER

## 2023-11-07 NOTE — PATIENT INSTRUCTIONS
and organizations are good sources of information about products that improve your quality of life and insure your safety.      Last Reviewed: November 2009 Michael Francis MD   Updated: 3/7/2011

## 2023-11-07 NOTE — PROGRESS NOTES
Medicare Annual Wellness Visit    Maikol Francois is here for Medicare AWV    Assessment & Plan   Medicare annual wellness visit, subsequent  Primary hypertension  -     valsartan (DIOVAN) 160 MG tablet; Take 1 tablet by mouth daily, Disp-90 tablet, R-1Normal  -     CBC; Future  -     Comprehensive Metabolic Panel; Future  -     Lipid Panel; Future  -     TSH; Future  Generalized anxiety disorder  -     busPIRone (BUSPAR) 10 MG tablet; Take 1 tablet by mouth 2 times daily as needed (anxiety), Disp-60 tablet, R-5Normal    Recommendations for Preventive Services Due: see orders and patient instructions/AVS.  Recommended screening schedule for the next 5-10 years is provided to the patient in written form: see Patient Instructions/AVS.     Return in 3 months (on 2/7/2024) for f/u htn and Medicare Annual Wellness Visit in 1 year. Subjective   Patient needs a new home blood pressure cuff. Still taking amlodipine 10 mg, Toprol , and Diovan 160 mg daily. Under a lot of stress and has had left ankle pain. Patient's complete Health Risk Assessment and screening values have been reviewed and are found in Flowsheets. The following problems were reviewed today and where indicated follow up appointments were made and/or referrals ordered.     Positive Risk Factor Screenings with Interventions:               General HRA Questions:  Select all that apply: (!) Stress    Stress Interventions:  Continue Buspar 10 mg BID prn        Dentist Screen:  Have you seen the dentist within the past year?: (!) No    Intervention:  Patient declines any further evaluation or treatment    Hearing Screen:  Do you or your family notice any trouble with your hearing that hasn't been managed with hearing aids?: (!) Yes    Interventions:  Patient declines any further evaluation or treatment                         Objective   Vitals:    11/07/23 1158 11/07/23 1213   BP: (!) 164/80 (!) 164/84   Pulse: 64    Resp: 20    SpO2: 96%    Weight:

## 2024-01-23 DIAGNOSIS — I10 PRIMARY HYPERTENSION: ICD-10-CM

## 2024-01-23 LAB
ALBUMIN SERPL-MCNC: 4.3 G/DL (ref 3.5–5.2)
ALP BLD-CCNC: 86 U/L (ref 35–104)
ALT SERPL-CCNC: 11 U/L (ref 0–32)
ANION GAP SERPL CALCULATED.3IONS-SCNC: 10 MMOL/L (ref 7–16)
AST SERPL-CCNC: 24 U/L (ref 0–31)
BILIRUB SERPL-MCNC: 0.2 MG/DL (ref 0–1.2)
BUN BLDV-MCNC: 17 MG/DL (ref 6–23)
CALCIUM SERPL-MCNC: 9.5 MG/DL (ref 8.6–10.2)
CHLORIDE BLD-SCNC: 99 MMOL/L (ref 98–107)
CHOLESTEROL: 141 MG/DL
CO2: 31 MMOL/L (ref 22–29)
CREAT SERPL-MCNC: 0.7 MG/DL (ref 0.5–1)
GFR SERPL CREATININE-BSD FRML MDRD: >60 ML/MIN/1.73M2
GLUCOSE BLD-MCNC: 74 MG/DL (ref 74–99)
HCT VFR BLD CALC: 44.6 % (ref 34–48)
HDLC SERPL-MCNC: 72 MG/DL
HEMOGLOBIN: 14.2 G/DL (ref 11.5–15.5)
LDL CHOLESTEROL: 51 MG/DL
MCH RBC QN AUTO: 30 PG (ref 26–35)
MCHC RBC AUTO-ENTMCNC: 31.8 G/DL (ref 32–34.5)
MCV RBC AUTO: 94.3 FL (ref 80–99.9)
PDW BLD-RTO: 12.7 % (ref 11.5–15)
PLATELET # BLD: 164 K/UL (ref 130–450)
PMV BLD AUTO: 10.8 FL (ref 7–12)
POTASSIUM SERPL-SCNC: 4.2 MMOL/L (ref 3.5–5)
RBC # BLD: 4.73 M/UL (ref 3.5–5.5)
SODIUM BLD-SCNC: 140 MMOL/L (ref 132–146)
TOTAL PROTEIN: 7.2 G/DL (ref 6.4–8.3)
TRIGL SERPL-MCNC: 91 MG/DL
TSH SERPL DL<=0.05 MIU/L-ACNC: 4.98 UIU/ML (ref 0.27–4.2)
VLDLC SERPL CALC-MCNC: 18 MG/DL
WBC # BLD: 5.3 K/UL (ref 4.5–11.5)

## 2024-01-24 RX ORDER — ATORVASTATIN CALCIUM 40 MG/1
40 TABLET, FILM COATED ORAL NIGHTLY
Qty: 90 TABLET | Refills: 1 | Status: SHIPPED | OUTPATIENT
Start: 2024-01-24

## 2024-02-07 ENCOUNTER — OFFICE VISIT (OUTPATIENT)
Dept: FAMILY MEDICINE CLINIC | Age: 84
End: 2024-02-07

## 2024-02-07 VITALS
BODY MASS INDEX: 24.11 KG/M2 | HEART RATE: 68 BPM | DIASTOLIC BLOOD PRESSURE: 82 MMHG | OXYGEN SATURATION: 98 % | WEIGHT: 131 LBS | RESPIRATION RATE: 20 BRPM | HEIGHT: 62 IN | SYSTOLIC BLOOD PRESSURE: 140 MMHG

## 2024-02-07 DIAGNOSIS — F41.1 GENERALIZED ANXIETY DISORDER: ICD-10-CM

## 2024-02-07 DIAGNOSIS — F13.20 SEDATIVE, HYPNOTIC OR ANXIOLYTIC DEPENDENCE, UNCOMPLICATED (HCC): ICD-10-CM

## 2024-02-07 DIAGNOSIS — I10 PRIMARY HYPERTENSION: Primary | ICD-10-CM

## 2024-02-07 DIAGNOSIS — G40.209 PARTIAL SYMPTOMATIC EPILEPSY WITH COMPLEX PARTIAL SEIZURES, NOT INTRACTABLE, WITHOUT STATUS EPILEPTICUS (HCC): ICD-10-CM

## 2024-02-07 RX ORDER — DIAZEPAM 5 MG/1
5 TABLET ORAL 2 TIMES DAILY PRN
Qty: 90 TABLET | Refills: 1 | Status: SHIPPED | OUTPATIENT
Start: 2024-02-07 | End: 2025-02-06

## 2024-02-07 RX ORDER — METOPROLOL SUCCINATE 100 MG/1
100 TABLET, EXTENDED RELEASE ORAL DAILY
Qty: 90 TABLET | Refills: 1 | Status: SHIPPED | OUTPATIENT
Start: 2024-02-07

## 2024-02-07 ASSESSMENT — PATIENT HEALTH QUESTIONNAIRE - PHQ9
SUM OF ALL RESPONSES TO PHQ QUESTIONS 1-9: 0
SUM OF ALL RESPONSES TO PHQ QUESTIONS 1-9: 0
SUM OF ALL RESPONSES TO PHQ9 QUESTIONS 1 & 2: 0
1. LITTLE INTEREST OR PLEASURE IN DOING THINGS: 0
SUM OF ALL RESPONSES TO PHQ QUESTIONS 1-9: 0
SUM OF ALL RESPONSES TO PHQ QUESTIONS 1-9: 0
2. FEELING DOWN, DEPRESSED OR HOPELESS: 0

## 2024-02-07 ASSESSMENT — ENCOUNTER SYMPTOMS
VOMITING: 0
DIARRHEA: 0
NAUSEA: 0
SHORTNESS OF BREATH: 0

## 2024-02-07 NOTE — PROGRESS NOTES
Chief Complaint   Patient presents with    Hypertension       Patient is here for follow up for hypertension    Hypertension:  Patient is here for follow up chronic hypertension.  This is  generally controlled on current medication regimen.  BP today is 140/82.  Takes meds as directed and tolerates them well.  Most recent labs reviewed with patient, including CMP, CBC, TSH, and lipid panel, and are not remarkable.  No symptoms from htn standpoint per ROS.  Patientis  compliant with lifestyle modifications.  Patient does not smoke.      Patient has history of anxiety and anxiolytic dependence.    Patient doing well on current regimen for seizure disorder.    Patient's past medical, surgical, social and/or family history reviewed, updated inchart, and are non-contributory (unless otherwise stated).  Medications and allergies also reviewed and updated in chart.      Current Outpatient Medications   Medication Sig Dispense Refill    diazePAM (VALIUM) 5 MG tablet Take 1 tablet by mouth 2 times daily as needed for Sleep or Anxiety. Max Daily Amount: 10 mg 90 tablet 1    metoprolol succinate (TOPROL XL) 100 MG extended release tablet Take 1 tablet by mouth daily 90 tablet 1    atorvastatin (LIPITOR) 40 MG tablet TAKE 1 TABLET BY MOUTH EVERY NIGHT 90 tablet 1    busPIRone (BUSPAR) 10 MG tablet Take 1 tablet by mouth 2 times daily as needed (anxiety) 60 tablet 5    valsartan (DIOVAN) 160 MG tablet Take 1 tablet by mouth daily 90 tablet 1    amLODIPine (NORVASC) 10 MG tablet Take 1 tablet by mouth daily 90 tablet 1    cetirizine (ZYRTEC) 10 MG tablet Take 1 tablet by mouth in the morning and 1 tablet in the evening.      clobetasol (TEMOVATE) 0.05 % cream APPLY CREAM TOPICALLY TWICE DAILY AS NEEDED FOR FLARES TO BODY      acetaminophen (TYLENOL) 325 MG tablet Take 2 tablets by mouth every 6 hours as needed for Pain      b complex vitamins capsule Take 1 capsule by mouth daily      melatonin 3 MG TABS tablet Take 1 tablet by

## 2024-04-05 DIAGNOSIS — I10 PRIMARY HYPERTENSION: ICD-10-CM

## 2024-04-08 RX ORDER — VALSARTAN 160 MG/1
160 TABLET ORAL DAILY
Qty: 90 TABLET | Refills: 1 | Status: SHIPPED | OUTPATIENT
Start: 2024-04-08

## 2024-05-07 ENCOUNTER — OFFICE VISIT (OUTPATIENT)
Dept: FAMILY MEDICINE CLINIC | Age: 84
End: 2024-05-07

## 2024-05-07 VITALS
OXYGEN SATURATION: 97 % | HEART RATE: 71 BPM | DIASTOLIC BLOOD PRESSURE: 64 MMHG | BODY MASS INDEX: 23.92 KG/M2 | SYSTOLIC BLOOD PRESSURE: 132 MMHG | RESPIRATION RATE: 20 BRPM | WEIGHT: 130 LBS | HEIGHT: 62 IN

## 2024-05-07 DIAGNOSIS — M79.2 NEUROPATHIC PAIN OF FOOT, LEFT: ICD-10-CM

## 2024-05-07 DIAGNOSIS — F41.1 GENERALIZED ANXIETY DISORDER: ICD-10-CM

## 2024-05-07 DIAGNOSIS — I10 PRIMARY HYPERTENSION: Primary | ICD-10-CM

## 2024-05-07 RX ORDER — DIAZEPAM 5 MG/1
5 TABLET ORAL 2 TIMES DAILY PRN
Qty: 90 TABLET | Refills: 1 | Status: SHIPPED | OUTPATIENT
Start: 2024-05-07 | End: 2025-05-07

## 2024-05-07 RX ORDER — PRIMIDONE 250 MG/1
250 TABLET ORAL 3 TIMES DAILY
Qty: 270 TABLET | Refills: 1 | Status: SHIPPED | OUTPATIENT
Start: 2024-05-07

## 2024-05-07 RX ORDER — PRIMIDONE 250 MG/1
250 TABLET ORAL 3 TIMES DAILY
Qty: 120 TABLET | Refills: 1 | Status: CANCELLED | OUTPATIENT
Start: 2024-05-07

## 2024-05-07 RX ORDER — AMLODIPINE BESYLATE 10 MG/1
10 TABLET ORAL DAILY
Qty: 90 TABLET | Refills: 1 | Status: SHIPPED | OUTPATIENT
Start: 2024-05-07

## 2024-05-07 RX ORDER — GABAPENTIN 300 MG/1
300 CAPSULE ORAL 3 TIMES DAILY
COMMUNITY

## 2024-05-07 SDOH — ECONOMIC STABILITY: FOOD INSECURITY: WITHIN THE PAST 12 MONTHS, YOU WORRIED THAT YOUR FOOD WOULD RUN OUT BEFORE YOU GOT MONEY TO BUY MORE.: NEVER TRUE

## 2024-05-07 SDOH — ECONOMIC STABILITY: FOOD INSECURITY: WITHIN THE PAST 12 MONTHS, THE FOOD YOU BOUGHT JUST DIDN'T LAST AND YOU DIDN'T HAVE MONEY TO GET MORE.: NEVER TRUE

## 2024-05-07 SDOH — ECONOMIC STABILITY: INCOME INSECURITY: HOW HARD IS IT FOR YOU TO PAY FOR THE VERY BASICS LIKE FOOD, HOUSING, MEDICAL CARE, AND HEATING?: NOT HARD AT ALL

## 2024-05-07 SDOH — ECONOMIC STABILITY: HOUSING INSECURITY
IN THE LAST 12 MONTHS, WAS THERE A TIME WHEN YOU DID NOT HAVE A STEADY PLACE TO SLEEP OR SLEPT IN A SHELTER (INCLUDING NOW)?: NO

## 2024-05-07 ASSESSMENT — ENCOUNTER SYMPTOMS
DIARRHEA: 0
NAUSEA: 0
VOMITING: 0
SHORTNESS OF BREATH: 0

## 2024-05-07 NOTE — PROGRESS NOTES
Chief Complaint   Patient presents with    Hypertension       Patient is here for follow up for hypertension    Hypertension:  Patient is here for follow up chronic hypertension.  This is  generally controlled on current medication regimen.  BP today is 1342/64.  Takes meds as directed and tolerates them well.  Most recent labs reviewed with patient, including CMP, CBC, TSH, and lipid panel, and are remarkable for slightly elevated TSH.   No symptoms from htn standpoint per ROS.  Patientis  compliant with lifestyle modifications.  Patient does not smoke.  Comorbid conditions include anxiety.      Patient's past medical, surgical, social and/or family history reviewed, updated inchart, and are non-contributory (unless otherwise stated).  Medications and allergies also reviewed and updated in chart.      Current Outpatient Medications   Medication Sig Dispense Refill    amLODIPine (NORVASC) 10 MG tablet Take 1 tablet by mouth daily 90 tablet 1    diazePAM (VALIUM) 5 MG tablet Take 1 tablet by mouth 2 times daily as needed for Sleep or Anxiety. Max Daily Amount: 10 mg 90 tablet 1    gabapentin (NEURONTIN) 300 MG capsule Take 1 capsule by mouth 3 times daily.      primidone (MYSOLINE) 250 MG tablet Take 1 tablet by mouth 3 times daily 270 tablet 1    valsartan (DIOVAN) 160 MG tablet TAKE 1 TABLET BY MOUTH DAILY 90 tablet 1    metoprolol succinate (TOPROL XL) 100 MG extended release tablet Take 1 tablet by mouth daily 90 tablet 1    atorvastatin (LIPITOR) 40 MG tablet TAKE 1 TABLET BY MOUTH EVERY NIGHT 90 tablet 1    busPIRone (BUSPAR) 10 MG tablet Take 1 tablet by mouth 2 times daily as needed (anxiety) 60 tablet 5    cetirizine (ZYRTEC) 10 MG tablet Take 1 tablet by mouth in the morning and 1 tablet in the evening.      clobetasol (TEMOVATE) 0.05 % cream APPLY CREAM TOPICALLY TWICE DAILY AS NEEDED FOR FLARES TO BODY      acetaminophen (TYLENOL) 325 MG tablet Take 2 tablets by mouth every 6 hours as needed for Pain

## 2024-05-15 ENCOUNTER — OFFICE VISIT (OUTPATIENT)
Dept: PAIN MANAGEMENT | Age: 84
End: 2024-05-15
Payer: MEDICARE

## 2024-05-15 VITALS
HEART RATE: 71 BPM | HEIGHT: 62 IN | DIASTOLIC BLOOD PRESSURE: 80 MMHG | WEIGHT: 130 LBS | RESPIRATION RATE: 18 BRPM | BODY MASS INDEX: 23.92 KG/M2 | TEMPERATURE: 97.3 F | SYSTOLIC BLOOD PRESSURE: 210 MMHG | OXYGEN SATURATION: 96 %

## 2024-05-15 DIAGNOSIS — G89.4 CHRONIC PAIN SYNDROME: Primary | ICD-10-CM

## 2024-05-15 DIAGNOSIS — M47.816 LUMBAR FACET ARTHROPATHY: ICD-10-CM

## 2024-05-15 DIAGNOSIS — M15.9 PRIMARY OSTEOARTHRITIS INVOLVING MULTIPLE JOINTS: ICD-10-CM

## 2024-05-15 DIAGNOSIS — M79.2 NEURALGIA AND NEURITIS: ICD-10-CM

## 2024-05-15 DIAGNOSIS — G89.29 CHRONIC PAIN OF LEFT ANKLE: ICD-10-CM

## 2024-05-15 DIAGNOSIS — M48.061 SPINAL STENOSIS OF LUMBAR REGION WITHOUT NEUROGENIC CLAUDICATION: ICD-10-CM

## 2024-05-15 DIAGNOSIS — M51.9 LUMBAR DISC DISORDER: ICD-10-CM

## 2024-05-15 DIAGNOSIS — M25.572 CHRONIC PAIN OF LEFT ANKLE: ICD-10-CM

## 2024-05-15 PROCEDURE — 3077F SYST BP >= 140 MM HG: CPT | Performed by: PAIN MEDICINE

## 2024-05-15 PROCEDURE — G8399 PT W/DXA RESULTS DOCUMENT: HCPCS | Performed by: PAIN MEDICINE

## 2024-05-15 PROCEDURE — G8420 CALC BMI NORM PARAMETERS: HCPCS | Performed by: PAIN MEDICINE

## 2024-05-15 PROCEDURE — 1123F ACP DISCUSS/DSCN MKR DOCD: CPT | Performed by: PAIN MEDICINE

## 2024-05-15 PROCEDURE — G8427 DOCREV CUR MEDS BY ELIG CLIN: HCPCS | Performed by: PAIN MEDICINE

## 2024-05-15 PROCEDURE — 1090F PRES/ABSN URINE INCON ASSESS: CPT | Performed by: PAIN MEDICINE

## 2024-05-15 PROCEDURE — 1036F TOBACCO NON-USER: CPT | Performed by: PAIN MEDICINE

## 2024-05-15 PROCEDURE — 99214 OFFICE O/P EST MOD 30 MIN: CPT | Performed by: PAIN MEDICINE

## 2024-05-15 PROCEDURE — 3079F DIAST BP 80-89 MM HG: CPT | Performed by: PAIN MEDICINE

## 2024-05-15 RX ORDER — DULOXETIN HYDROCHLORIDE 30 MG/1
30 CAPSULE, DELAYED RELEASE ORAL DAILY
Qty: 30 CAPSULE | Refills: 0 | Status: SHIPPED | OUTPATIENT
Start: 2024-05-15 | End: 2024-06-14

## 2024-05-15 NOTE — PROGRESS NOTES
Courtney Hdz presents to the [facility] on 5/15/2024. Courtney is complaining of pain left ankle. The pain is constant. The pain is described as aching, burning, and tight. Pain is rated on her best day at a 4, on her worst day at a 8, and on average at a 6 on the VAS scale. She took her last dose of Neurontin and Tylenol Tylenol was yesterday.      Any procedures since your last visit: No  She is not on NSAIDS and  is not on anticoagulation medications to include none   Pacemaker or defibrillator: No     Medication Contract and Consent for Opioid Use Documents Filed        No documents found                       BP (!) 210/80   Pulse 71   Temp 97.3 °F (36.3 °C) (Infrared)   Resp 18   Ht 1.575 m (5' 2.01\")   Wt 59 kg (130 lb)   SpO2 96%   BMI 23.77 kg/m²      No LMP recorded. Patient has had a hysterectomy.  
TUNNEL RELEASE Left     CHOLECYSTECTOMY      COLONOSCOPY      FINGER TRIGGER RELEASE Left     HC INJECT OTHER PERPHRL NERV Right 10/8/2019    RIGHT HIP INJECTION (CPT 88248) performed by Devyn Pugh MD at [facility] OR    HC INJECTION PROCEDURE FOR SACROILIAC JOINT Right 9/19/2019    RIGHT SACROILIAC JOINT INJECTION performed by Devyn Pugh MD at [facility] OR    HERNIA REPAIR Right 10/31/2014    Laparoscopic right inguinal hernia repair with mesh     HYSTERECTOMY (CERVIX STATUS UNKNOWN)      vaginal    KNEE ARTHROSCOPY      right    NERVE BLOCK Right 09/19/2019    sacroiliac joint    NERVE BLOCK Right 10/08/2019    hip injection    TONSILLECTOMY AND ADENOIDECTOMY      TOTAL HIP ARTHROPLASTY Right 01/2020     Prior to Admission medications    Medication Sig Start Date End Date Taking? Authorizing Provider   amLODIPine (NORVASC) 10 MG tablet Take 1 tablet by mouth daily 5/7/24  Yes Cherise Goss MD   diazePAM (VALIUM) 5 MG tablet Take 1 tablet by mouth 2 times daily as needed for Sleep or Anxiety. Max Daily Amount: 10 mg 5/7/24 5/7/25 Yes Cherise Goss MD   gabapentin (NEURONTIN) 300 MG capsule Take 1 capsule by mouth 3 times daily.   Yes Provider, MD Carmita   primidone (MYSOLINE) 250 MG tablet Take 1 tablet by mouth 3 times daily 5/7/24  Yes Cherise Goss MD   valsartan (DIOVAN) 160 MG tablet TAKE 1 TABLET BY MOUTH DAILY 4/8/24  Yes Cherise Goss MD   metoprolol succinate (TOPROL XL) 100 MG extended release tablet Take 1 tablet by mouth daily 2/7/24  Yes Cherise Goss MD   atorvastatin (LIPITOR) 40 MG tablet TAKE 1 TABLET BY MOUTH EVERY NIGHT 1/24/24  Yes Cherise Goss MD   busPIRone (BUSPAR) 10 MG tablet Take 1 tablet by mouth 2 times daily as needed (anxiety) 11/7/23  Yes Cherise Goss MD   cetirizine (ZYRTEC) 10 MG tablet Take 1 tablet by mouth in the morning and 1 tablet in the evening.   Yes

## 2024-07-08 RX ORDER — ATORVASTATIN CALCIUM 40 MG/1
40 TABLET, FILM COATED ORAL NIGHTLY
Qty: 90 TABLET | Refills: 1 | Status: SHIPPED | OUTPATIENT
Start: 2024-07-08

## 2024-07-08 NOTE — TELEPHONE ENCOUNTER
Last seen 5/7/2024  Next appt 11/11/2024    Requested Prescriptions     Pending Prescriptions Disp Refills    atorvastatin (LIPITOR) 40 MG tablet [Pharmacy Med Name: ATORVASTATIN 40MG TABLETS] 90 tablet 1     Sig: TAKE 1 TABLET BY MOUTH EVERY NIGHT

## 2024-09-09 ENCOUNTER — OFFICE VISIT (OUTPATIENT)
Dept: FAMILY MEDICINE CLINIC | Age: 84
End: 2024-09-09
Payer: MEDICARE

## 2024-09-09 VITALS
BODY MASS INDEX: 23.92 KG/M2 | SYSTOLIC BLOOD PRESSURE: 164 MMHG | HEIGHT: 62 IN | DIASTOLIC BLOOD PRESSURE: 80 MMHG | HEART RATE: 65 BPM | OXYGEN SATURATION: 99 % | WEIGHT: 130 LBS | RESPIRATION RATE: 20 BRPM

## 2024-09-09 DIAGNOSIS — I10 PRIMARY HYPERTENSION: Primary | ICD-10-CM

## 2024-09-09 DIAGNOSIS — Z23 NEED FOR PROPHYLACTIC VACCINATION AND INOCULATION AGAINST INFLUENZA: ICD-10-CM

## 2024-09-09 DIAGNOSIS — F41.1 GENERALIZED ANXIETY DISORDER: ICD-10-CM

## 2024-09-09 PROCEDURE — G8420 CALC BMI NORM PARAMETERS: HCPCS | Performed by: FAMILY MEDICINE

## 2024-09-09 PROCEDURE — 1090F PRES/ABSN URINE INCON ASSESS: CPT | Performed by: FAMILY MEDICINE

## 2024-09-09 PROCEDURE — 1123F ACP DISCUSS/DSCN MKR DOCD: CPT | Performed by: FAMILY MEDICINE

## 2024-09-09 PROCEDURE — 90653 IIV ADJUVANT VACCINE IM: CPT | Performed by: FAMILY MEDICINE

## 2024-09-09 PROCEDURE — G8427 DOCREV CUR MEDS BY ELIG CLIN: HCPCS | Performed by: FAMILY MEDICINE

## 2024-09-09 PROCEDURE — G8399 PT W/DXA RESULTS DOCUMENT: HCPCS | Performed by: FAMILY MEDICINE

## 2024-09-09 PROCEDURE — 3079F DIAST BP 80-89 MM HG: CPT | Performed by: FAMILY MEDICINE

## 2024-09-09 PROCEDURE — G0008 ADMIN INFLUENZA VIRUS VAC: HCPCS | Performed by: FAMILY MEDICINE

## 2024-09-09 PROCEDURE — 1036F TOBACCO NON-USER: CPT | Performed by: FAMILY MEDICINE

## 2024-09-09 PROCEDURE — 99214 OFFICE O/P EST MOD 30 MIN: CPT | Performed by: FAMILY MEDICINE

## 2024-09-09 PROCEDURE — 3077F SYST BP >= 140 MM HG: CPT | Performed by: FAMILY MEDICINE

## 2024-09-09 RX ORDER — METOPROLOL SUCCINATE 100 MG/1
100 TABLET, EXTENDED RELEASE ORAL DAILY
Qty: 90 TABLET | Refills: 1 | Status: SHIPPED | OUTPATIENT
Start: 2024-09-09

## 2024-09-09 RX ORDER — PREGABALIN 25 MG/1
CAPSULE ORAL
COMMUNITY
Start: 2024-09-04

## 2024-09-09 RX ORDER — VALSARTAN AND HYDROCHLOROTHIAZIDE 160; 12.5 MG/1; MG/1
1 TABLET, FILM COATED ORAL DAILY
Qty: 90 TABLET | Refills: 1 | Status: SHIPPED | OUTPATIENT
Start: 2024-09-09

## 2024-09-09 RX ORDER — DIAZEPAM 5 MG
5 TABLET ORAL 2 TIMES DAILY PRN
Qty: 90 TABLET | Refills: 1 | Status: SHIPPED | OUTPATIENT
Start: 2024-09-09 | End: 2025-09-09

## 2024-09-09 RX ORDER — VALSARTAN 160 MG/1
160 TABLET ORAL DAILY
Qty: 90 TABLET | Refills: 1 | Status: CANCELLED | OUTPATIENT
Start: 2024-09-09

## 2024-09-09 RX ORDER — AMLODIPINE BESYLATE 10 MG/1
10 TABLET ORAL DAILY
Qty: 90 TABLET | Refills: 1
Start: 2024-09-09

## 2024-09-09 ASSESSMENT — PATIENT HEALTH QUESTIONNAIRE - PHQ9
SUM OF ALL RESPONSES TO PHQ QUESTIONS 1-9: 0
SUM OF ALL RESPONSES TO PHQ QUESTIONS 1-9: 0
SUM OF ALL RESPONSES TO PHQ9 QUESTIONS 1 & 2: 0
SUM OF ALL RESPONSES TO PHQ QUESTIONS 1-9: 0
SUM OF ALL RESPONSES TO PHQ QUESTIONS 1-9: 0
1. LITTLE INTEREST OR PLEASURE IN DOING THINGS: NOT AT ALL

## 2024-09-09 ASSESSMENT — ENCOUNTER SYMPTOMS
VOMITING: 0
NAUSEA: 0
SHORTNESS OF BREATH: 0
DIARRHEA: 0

## 2024-10-03 ENCOUNTER — TELEPHONE (OUTPATIENT)
Dept: FAMILY MEDICINE CLINIC | Age: 84
End: 2024-10-03

## 2024-10-03 RX ORDER — BENZONATATE 100 MG/1
100-200 CAPSULE ORAL 3 TIMES DAILY PRN
Qty: 60 CAPSULE | Refills: 0 | Status: SHIPPED | OUTPATIENT
Start: 2024-10-03

## 2024-10-03 NOTE — TELEPHONE ENCOUNTER
I returned patient's 's call. He put wife on the phone. She has had headache, fatigue and cough since 2 days ago. Wants something for symptoms, not Paxlovid. See orders.  Advised patient to take Tylenol and/or Advil for pain.

## 2024-10-14 DIAGNOSIS — I10 PRIMARY HYPERTENSION: ICD-10-CM

## 2024-10-14 RX ORDER — VALSARTAN 160 MG/1
160 TABLET ORAL DAILY
Qty: 90 TABLET | Refills: 1 | OUTPATIENT
Start: 2024-10-14

## 2024-10-14 RX ORDER — AMLODIPINE BESYLATE 10 MG/1
10 TABLET ORAL DAILY
Qty: 90 TABLET | Refills: 1 | Status: SHIPPED | OUTPATIENT
Start: 2024-10-14

## 2024-10-14 NOTE — TELEPHONE ENCOUNTER
Last seen 9/9/2024  Next appt 11/13/2024    Requested Prescriptions     Pending Prescriptions Disp Refills    valsartan (DIOVAN) 160 MG tablet [Pharmacy Med Name: VALSARTAN 160MG TABLETS] 90 tablet 1     Sig: TAKE 1 TABLET BY MOUTH DAILY    amLODIPine (NORVASC) 10 MG tablet [Pharmacy Med Name: AMLODIPINE BESYLATE 10MG TABLETS] 90 tablet 1     Sig: TAKE 1 TABLET BY MOUTH DAILY

## 2024-10-16 RX ORDER — DULOXETIN HYDROCHLORIDE 30 MG/1
30 CAPSULE, DELAYED RELEASE ORAL DAILY
Qty: 30 CAPSULE | Refills: 0 | OUTPATIENT
Start: 2024-10-16 | End: 2024-11-15

## 2024-11-13 ENCOUNTER — OFFICE VISIT (OUTPATIENT)
Dept: FAMILY MEDICINE CLINIC | Age: 84
End: 2024-11-13

## 2024-11-13 VITALS
SYSTOLIC BLOOD PRESSURE: 138 MMHG | DIASTOLIC BLOOD PRESSURE: 72 MMHG | WEIGHT: 129 LBS | HEART RATE: 61 BPM | BODY MASS INDEX: 23.74 KG/M2 | OXYGEN SATURATION: 98 % | HEIGHT: 62 IN | RESPIRATION RATE: 20 BRPM

## 2024-11-13 DIAGNOSIS — I10 PRIMARY HYPERTENSION: ICD-10-CM

## 2024-11-13 DIAGNOSIS — R41.3 MEMORY LOSS: ICD-10-CM

## 2024-11-13 DIAGNOSIS — G40.209 PARTIAL SYMPTOMATIC EPILEPSY WITH COMPLEX PARTIAL SEIZURES, NOT INTRACTABLE, WITHOUT STATUS EPILEPTICUS (HCC): ICD-10-CM

## 2024-11-13 DIAGNOSIS — Z00.00 MEDICARE ANNUAL WELLNESS VISIT, SUBSEQUENT: Primary | ICD-10-CM

## 2024-11-13 RX ORDER — LAMOTRIGINE 100 MG/1
100 TABLET ORAL 3 TIMES DAILY
Qty: 90 TABLET | Refills: 0 | Status: SHIPPED | OUTPATIENT
Start: 2024-11-13

## 2024-11-13 RX ORDER — ATORVASTATIN CALCIUM 40 MG/1
40 TABLET, FILM COATED ORAL NIGHTLY
Qty: 90 TABLET | Refills: 1 | Status: SHIPPED | OUTPATIENT
Start: 2024-11-13

## 2024-11-13 RX ORDER — PRIMIDONE 250 MG/1
250 TABLET ORAL 3 TIMES DAILY
Qty: 270 TABLET | Refills: 1 | Status: SHIPPED | OUTPATIENT
Start: 2024-11-13

## 2024-11-13 ASSESSMENT — PATIENT HEALTH QUESTIONNAIRE - PHQ9
2. FEELING DOWN, DEPRESSED OR HOPELESS: NOT AT ALL
SUM OF ALL RESPONSES TO PHQ QUESTIONS 1-9: 1
SUM OF ALL RESPONSES TO PHQ QUESTIONS 1-9: 1
SUM OF ALL RESPONSES TO PHQ9 QUESTIONS 1 & 2: 1
SUM OF ALL RESPONSES TO PHQ QUESTIONS 1-9: 1
1. LITTLE INTEREST OR PLEASURE IN DOING THINGS: SEVERAL DAYS
SUM OF ALL RESPONSES TO PHQ QUESTIONS 1-9: 1

## 2024-11-13 ASSESSMENT — LIFESTYLE VARIABLES: HOW OFTEN DO YOU HAVE A DRINK CONTAINING ALCOHOL: NEVER

## 2024-11-13 NOTE — PROGRESS NOTES
Physical Exam  Vitals reviewed.   Constitutional:       General: She is not in acute distress.     Appearance: She is well-developed.   Neck:      Vascular: No carotid bruit.   Cardiovascular:      Rate and Rhythm: Normal rate and regular rhythm.      Heart sounds: Normal heart sounds. No murmur heard.     No gallop.   Pulmonary:      Effort: Pulmonary effort is normal.      Breath sounds: Normal breath sounds. No wheezing or rales.   Abdominal:      General: Bowel sounds are normal. There is no distension.      Palpations: Abdomen is soft.      Tenderness: There is no abdominal tenderness.   Musculoskeletal:      Cervical back: Neck supple.      Right lower leg: No edema.      Left lower leg: No edema.   Skin:     General: Skin is warm and dry.   Neurological:      Mental Status: She is alert and oriented to person, place, and time.                   Allergies   Allergen Reactions    Penicillins Anaphylaxis and Swelling    Ace Inhibitors Hives    Dextromethorphan Hives    Factive [Gemifloxacin Mesylate] Other (See Comments)     \"Unknown\"     Mucinex Dm [Dm-Guaifenesin Er] Hives    Shellfish-Derived Products Hives and Swelling     Shrimp     Prior to Visit Medications    Medication Sig Taking? Authorizing Provider   atorvastatin (LIPITOR) 40 MG tablet Take 1 tablet by mouth nightly Yes Cherise Goss MD   lamoTRIgine (LAMICTAL) 100 MG tablet Take 1 tablet by mouth 3 times daily Yes Cherise Goss MD   primidone (MYSOLINE) 250 MG tablet Take 1 tablet by mouth 3 times daily Yes Cherise Goss MD   amLODIPine (NORVASC) 10 MG tablet TAKE 1 TABLET BY MOUTH DAILY Yes Cherise Goss MD   benzonatate (TESSALON) 100 MG capsule Take 1-2 capsules by mouth 3 times daily as needed for Cough Yes Cherise Goss MD   pregabalin (LYRICA) 25 MG capsule TAKE 1 CAPSULE BY MOUTH TWICE DAILY FOR PAIN Yes Provider, MD Carmita   diazePAM (VALIUM) 5 MG tablet Take 1

## 2024-11-15 DIAGNOSIS — F41.1 GENERALIZED ANXIETY DISORDER: ICD-10-CM

## 2024-11-15 NOTE — TELEPHONE ENCOUNTER
Name of Medication(s) Requested:  Requested Prescriptions     Pending Prescriptions Disp Refills    busPIRone (BUSPAR) 10 MG tablet [Pharmacy Med Name: BUSPIRONE 10MG TABLETS] 60 tablet 5     Sig: TAKE 1 TABLET BY MOUTH TWICE DAILY AS NEEDED FOR ANXIETY       Medication is on current medication list Yes    Dosage and directions were verified? Yes    Quantity verified: 30 day supply     Pharmacy Verified?  Yes    Last Appointment:  11/13/2024    Future appts:  Future Appointments   Date Time Provider Department Center   12/19/2024 10:00 AM Cherise Goss MD [facility]        (If no appt send self scheduling link. .REFILLAPPT)  Scheduling request sent?     [] Yes  [x] No    Does patient need updated?  [] Yes  [x] No

## 2024-11-17 RX ORDER — BUSPIRONE HYDROCHLORIDE 10 MG/1
10 TABLET ORAL 2 TIMES DAILY PRN
Qty: 60 TABLET | Refills: 5 | Status: SHIPPED | OUTPATIENT
Start: 2024-11-17

## 2024-12-03 DIAGNOSIS — I10 PRIMARY HYPERTENSION: ICD-10-CM

## 2024-12-03 DIAGNOSIS — R41.3 MEMORY LOSS: ICD-10-CM

## 2024-12-03 LAB
HCT VFR BLD CALC: 43.1 % (ref 34–48)
HEMOGLOBIN: 13.8 G/DL (ref 11.5–15.5)
MCH RBC QN AUTO: 29.5 PG (ref 26–35)
MCHC RBC AUTO-ENTMCNC: 32 G/DL (ref 32–34.5)
MCV RBC AUTO: 92.1 FL (ref 80–99.9)
PDW BLD-RTO: 13.1 % (ref 11.5–15)
PLATELET # BLD: 154 K/UL (ref 130–450)
PMV BLD AUTO: 11.4 FL (ref 7–12)
RBC # BLD: 4.68 M/UL (ref 3.5–5.5)
VITAMIN B-12: 1042 PG/ML (ref 211–946)
WBC # BLD: 5.8 K/UL (ref 4.5–11.5)

## 2024-12-04 LAB
ALBUMIN: 4.5 G/DL (ref 3.5–5.2)
ALP BLD-CCNC: 84 U/L (ref 35–104)
ALT SERPL-CCNC: 14 U/L (ref 0–32)
ANION GAP SERPL CALCULATED.3IONS-SCNC: 8 MMOL/L (ref 7–16)
AST SERPL-CCNC: 28 U/L (ref 0–31)
BILIRUB SERPL-MCNC: 0.2 MG/DL (ref 0–1.2)
BUN BLDV-MCNC: 10 MG/DL (ref 6–23)
CALCIUM SERPL-MCNC: 9.1 MG/DL (ref 8.6–10.2)
CHLORIDE BLD-SCNC: 101 MMOL/L (ref 98–107)
CHOLESTEROL, TOTAL: 135 MG/DL
CO2: 31 MMOL/L (ref 22–29)
CREAT SERPL-MCNC: 0.7 MG/DL (ref 0.5–1)
GFR, ESTIMATED: 87 ML/MIN/1.73M2
GLUCOSE BLD-MCNC: 90 MG/DL (ref 74–99)
HDLC SERPL-MCNC: 71 MG/DL
LDL CHOLESTEROL: 47 MG/DL
POTASSIUM SERPL-SCNC: 4.2 MMOL/L (ref 3.5–5)
SODIUM BLD-SCNC: 140 MMOL/L (ref 132–146)
TOTAL PROTEIN: 7.4 G/DL (ref 6.4–8.3)
TRIGL SERPL-MCNC: 83 MG/DL
TSH SERPL DL<=0.05 MIU/L-ACNC: 4.19 UIU/ML (ref 0.27–4.2)
VLDLC SERPL CALC-MCNC: 17 MG/DL

## 2024-12-15 DIAGNOSIS — I10 PRIMARY HYPERTENSION: ICD-10-CM

## 2024-12-16 ENCOUNTER — TELEPHONE (OUTPATIENT)
Dept: FAMILY MEDICINE CLINIC | Age: 84
End: 2024-12-16

## 2024-12-16 RX ORDER — VALSARTAN AND HYDROCHLOROTHIAZIDE 160; 12.5 MG/1; MG/1
1 TABLET, FILM COATED ORAL DAILY
Qty: 90 TABLET | Refills: 1 | Status: SHIPPED
Start: 2024-12-16 | End: 2024-12-19

## 2024-12-16 NOTE — TELEPHONE ENCOUNTER
I do not recommend patient continue PT if blood pressure is truly that high.  She has appointment on 12/19 and we will assess BP then.

## 2024-12-16 NOTE — TELEPHONE ENCOUNTER
Patient ,Jaime called stating that patient blood pressure has been reading hige at [facility] [place] where patient is going for therapy.  States that patient blood pressure was, 230/90 something and that [facility] [place] is stating that needs a note for something stating that it is okay for patient to treat patient with blood pressure being so high?

## 2024-12-16 NOTE — TELEPHONE ENCOUNTER
Spoke to patient , Jaime.  Informed of the message per Dr Nelson that does not recommend that patient continues PT is blood pressure is really that high that will assess patient blood pressure when sees her Thursday, 12/19/2024 for her appointment.  Patient  voiced understanding of this and will cancel patient therapy until patient is seen by Dr Nelson.

## 2024-12-16 NOTE — TELEPHONE ENCOUNTER
Last seen 11/13/2024  Next appt 12/19/2024    Requested Prescriptions     Pending Prescriptions Disp Refills    valsartan-hydroCHLOROthiazide (DIOVAN-HCT) 160-12.5 MG per tablet [Pharmacy Med Name: VALSARTAN/HCTZ 160MG/12.5MG TABLETS] 90 tablet 1     Sig: TAKE 1 TABLET BY MOUTH DAILY

## 2024-12-19 ENCOUNTER — OFFICE VISIT (OUTPATIENT)
Dept: FAMILY MEDICINE CLINIC | Age: 84
End: 2024-12-19

## 2024-12-19 VITALS
DIASTOLIC BLOOD PRESSURE: 78 MMHG | SYSTOLIC BLOOD PRESSURE: 160 MMHG | BODY MASS INDEX: 24.29 KG/M2 | TEMPERATURE: 97 F | HEART RATE: 62 BPM | WEIGHT: 132 LBS | RESPIRATION RATE: 16 BRPM | HEIGHT: 62 IN | OXYGEN SATURATION: 97 %

## 2024-12-19 DIAGNOSIS — R41.3 POOR SHORT-TERM MEMORY: Primary | ICD-10-CM

## 2024-12-19 DIAGNOSIS — G40.209 PARTIAL SYMPTOMATIC EPILEPSY WITH COMPLEX PARTIAL SEIZURES, NOT INTRACTABLE, WITHOUT STATUS EPILEPTICUS (HCC): ICD-10-CM

## 2024-12-19 DIAGNOSIS — I10 MALIGNANT HYPERTENSION: ICD-10-CM

## 2024-12-19 RX ORDER — VALSARTAN AND HYDROCHLOROTHIAZIDE 320; 12.5 MG/1; MG/1
1 TABLET, FILM COATED ORAL DAILY
Qty: 30 TABLET | Refills: 3 | Status: SHIPPED
Start: 2024-12-19 | End: 2024-12-22

## 2024-12-19 RX ORDER — PREGABALIN 50 MG/1
CAPSULE ORAL
COMMUNITY
Start: 2024-12-11

## 2024-12-19 RX ORDER — METOPROLOL SUCCINATE 100 MG/1
100 TABLET, EXTENDED RELEASE ORAL DAILY
Qty: 90 TABLET | Refills: 1
Start: 2024-12-19

## 2024-12-19 RX ORDER — LAMOTRIGINE 100 MG/1
100 TABLET ORAL 3 TIMES DAILY
Qty: 90 TABLET | Refills: 5 | Status: SHIPPED | OUTPATIENT
Start: 2024-12-19

## 2024-12-19 RX ORDER — AMLODIPINE BESYLATE 10 MG/1
10 TABLET ORAL DAILY
Qty: 90 TABLET | Refills: 1
Start: 2024-12-19

## 2024-12-19 NOTE — TELEPHONE ENCOUNTER
Last seen 12/19/2024  Next appt 1/23/2025    Requested Prescriptions     Pending Prescriptions Disp Refills    valsartan-hydroCHLOROthiazide (DIOVAN-HCT) 320-12.5 MG per tablet [Pharmacy Med Name: VALSARTAN/HCTZ 320MG/12.5MG TABLETS] 90 tablet      Sig: TAKE 1 TABLET BY MOUTH DAILY

## 2024-12-19 NOTE — PROGRESS NOTES
tablet Take 1 tablet by mouth nightly 90 tablet 1    primidone (MYSOLINE) 250 MG tablet Take 1 tablet by mouth 3 times daily 270 tablet 1    diazePAM (VALIUM) 5 MG tablet Take 1 tablet by mouth 2 times daily as needed for Sleep or Anxiety. Max Daily Amount: 10 mg 90 tablet 1    cetirizine (ZYRTEC) 10 MG tablet Take 1 tablet by mouth in the morning and 1 tablet in the evening.      clobetasol (TEMOVATE) 0.05 % cream APPLY CREAM TOPICALLY TWICE DAILY AS NEEDED FOR FLARES TO BODY      acetaminophen (TYLENOL) 325 MG tablet Take 2 tablets by mouth every 6 hours as needed for Pain      b complex vitamins capsule Take 1 capsule by mouth daily      melatonin 3 MG TABS tablet Take 1 tablet by mouth nightly      busPIRone (BUSPAR) 10 MG tablet TAKE 1 TABLET BY MOUTH TWICE DAILY AS NEEDED FOR ANXIETY (Patient not taking: Reported on 12/19/2024) 60 tablet 5    benzonatate (TESSALON) 100 MG capsule Take 1-2 capsules by mouth 3 times daily as needed for Cough (Patient not taking: Reported on 12/19/2024) 60 capsule 0    pregabalin (LYRICA) 25 MG capsule Take 2 capsules by mouth in the morning and 2 capsules in the evening. Dosage change . (Patient not taking: Reported on 12/19/2024)      DULoxetine (CYMBALTA) 30 MG extended release capsule Take 1 capsule by mouth daily 30 capsule 0     No current facility-administered medications for this visit.       Wt Readings from Last 3 Encounters:   12/19/24 59.9 kg (132 lb)   11/13/24 58.5 kg (129 lb)   09/09/24 59 kg (130 lb)                   Vitals:    12/19/24 1015   BP: (!) 160/78   Pulse: 62   Resp: 16   Temp: 97 °F (36.1 °C)   SpO2: 97%       Physical Exam  Constitutional:       Appearance: Normal appearance.   Eyes:      Conjunctiva/sclera: Conjunctivae normal.   Pulmonary:      Effort: Pulmonary effort is normal. No respiratory distress.   Neurological:      Mental Status: She is alert.   Psychiatric:         Mood and Affect: Mood normal.           Results for orders placed or

## 2024-12-22 RX ORDER — VALSARTAN AND HYDROCHLOROTHIAZIDE 320; 12.5 MG/1; MG/1
1 TABLET, FILM COATED ORAL DAILY
Qty: 90 TABLET | Refills: 0 | Status: SHIPPED | OUTPATIENT
Start: 2024-12-22

## 2025-01-23 ENCOUNTER — OFFICE VISIT (OUTPATIENT)
Dept: FAMILY MEDICINE CLINIC | Age: 85
End: 2025-01-23

## 2025-01-23 ENCOUNTER — TELEPHONE (OUTPATIENT)
Dept: FAMILY MEDICINE CLINIC | Age: 85
End: 2025-01-23

## 2025-01-23 VITALS
SYSTOLIC BLOOD PRESSURE: 144 MMHG | OXYGEN SATURATION: 96 % | WEIGHT: 135 LBS | HEIGHT: 62 IN | RESPIRATION RATE: 19 BRPM | DIASTOLIC BLOOD PRESSURE: 84 MMHG | HEART RATE: 66 BPM | BODY MASS INDEX: 24.84 KG/M2 | TEMPERATURE: 97.8 F

## 2025-01-23 DIAGNOSIS — G40.209 PARTIAL SYMPTOMATIC EPILEPSY WITH COMPLEX PARTIAL SEIZURES, NOT INTRACTABLE, WITHOUT STATUS EPILEPTICUS (HCC): ICD-10-CM

## 2025-01-23 DIAGNOSIS — F41.1 GENERALIZED ANXIETY DISORDER: ICD-10-CM

## 2025-01-23 DIAGNOSIS — I10 MALIGNANT HYPERTENSION: ICD-10-CM

## 2025-01-23 DIAGNOSIS — F13.20 SEDATIVE, HYPNOTIC OR ANXIOLYTIC DEPENDENCE, UNCOMPLICATED (HCC): ICD-10-CM

## 2025-01-23 RX ORDER — VALSARTAN AND HYDROCHLOROTHIAZIDE 320; 12.5 MG/1; MG/1
1 TABLET, FILM COATED ORAL DAILY
Qty: 90 TABLET | Refills: 0
Start: 2025-01-23

## 2025-01-23 RX ORDER — DIAZEPAM 5 MG/1
5 TABLET ORAL 2 TIMES DAILY PRN
Qty: 90 TABLET | Refills: 1 | Status: SHIPPED | OUTPATIENT
Start: 2025-01-23 | End: 2026-01-23

## 2025-01-23 RX ORDER — ATORVASTATIN CALCIUM 40 MG/1
40 TABLET, FILM COATED ORAL NIGHTLY
Qty: 90 TABLET | Refills: 1 | Status: SHIPPED | OUTPATIENT
Start: 2025-01-23

## 2025-01-23 RX ORDER — AMLODIPINE BESYLATE 10 MG/1
10 TABLET ORAL DAILY
Qty: 90 TABLET | Refills: 1 | Status: SHIPPED | OUTPATIENT
Start: 2025-01-23

## 2025-01-23 RX ORDER — METOPROLOL SUCCINATE 100 MG/1
100 TABLET, EXTENDED RELEASE ORAL DAILY
Qty: 90 TABLET | Refills: 1
Start: 2025-01-23

## 2025-01-23 RX ORDER — LAMOTRIGINE 100 MG/1
100 TABLET ORAL 3 TIMES DAILY
Qty: 90 TABLET | Refills: 5 | Status: SHIPPED | OUTPATIENT
Start: 2025-01-23

## 2025-01-23 SDOH — ECONOMIC STABILITY: FOOD INSECURITY: WITHIN THE PAST 12 MONTHS, YOU WORRIED THAT YOUR FOOD WOULD RUN OUT BEFORE YOU GOT MONEY TO BUY MORE.: NEVER TRUE

## 2025-01-23 SDOH — ECONOMIC STABILITY: FOOD INSECURITY: WITHIN THE PAST 12 MONTHS, THE FOOD YOU BOUGHT JUST DIDN'T LAST AND YOU DIDN'T HAVE MONEY TO GET MORE.: NEVER TRUE

## 2025-01-23 ASSESSMENT — PATIENT HEALTH QUESTIONNAIRE - PHQ9
SUM OF ALL RESPONSES TO PHQ9 QUESTIONS 1 & 2: 0
SUM OF ALL RESPONSES TO PHQ QUESTIONS 1-9: 0
1. LITTLE INTEREST OR PLEASURE IN DOING THINGS: NOT AT ALL
SUM OF ALL RESPONSES TO PHQ QUESTIONS 1-9: 0
2. FEELING DOWN, DEPRESSED OR HOPELESS: NOT AT ALL

## 2025-01-23 NOTE — PROGRESS NOTES
[address], [address]   [place] [address]   [phone]   Cherise Goss MD     Patient: Courtney Hdz  Dateof Birth: 1940  Visit Date: 1/23/25    Courtney is a 84 y.o. year old female here today for   Chief Complaint   Patient presents with    Hypertension    Chest Pain     Pt states she feels chest pain and it feels worse at times on her back when taking deep a breath.       HPI  History of Present Illness  The patient is an 84-year-old female who presents for a hypertension follow-up.    She has been self-monitoring her blood pressure at home, which has shown a consistent decrease. She reports no presence of rash or skin changes. She experiences intermittent blurry vision and has not had an ophthalmological examination in approximately 3 years. She reports no gastrointestinal symptoms such as nausea, vomiting, or diarrhea. Additionally, she reports no urinary symptoms such as pain or burning during urination.    She reports experiencing chest pain on the right side, which radiates to her back. This pain is described as a pulling sensation and has been persistent for the past 4 days. The pain intensifies with physical activity, particularly when moving or lifting objects with her right arm. She also notes tenderness upon palpation of the affected area. She does not report any recent heavy lifting or strenuous activities, although it is mentioned that she was involved in snow removal from her driveway a few days prior to the onset of the pain. She has found some relief from the application of a topical cream. She does not experience shortness of breath or ankle swelling.          Recent lab results reviewed, including CMP, CBC, TSH, and lipid panel which are not remarkable.          Past medical, surgical, social and/or family historyreviewed, updated as needed, and are non-contributory (unless otherwise stated).  Medications, allergies, and problem list also

## 2025-01-23 NOTE — TELEPHONE ENCOUNTER
Patient's son (Andreina) left voicemail asking if he could speak with PCP to clarify a couple of questions that he has about today's visit.

## 2025-03-15 DIAGNOSIS — I10 MALIGNANT HYPERTENSION: ICD-10-CM

## 2025-03-17 NOTE — TELEPHONE ENCOUNTER
Name of Medication(s) Requested:  Requested Prescriptions     Pending Prescriptions Disp Refills    valsartan-hydroCHLOROthiazide (DIOVAN-HCT) 320-12.5 MG per tablet [Pharmacy Med Name: VALSARTAN/HCTZ 320MG/12.5MG TABLETS] 90 tablet 0     Sig: TAKE 1 TABLET BY MOUTH DAILY       Medication is on current medication list Yes    Dosage and directions were verified? Yes    Quantity verified: 90 day supply     Pharmacy Verified?  Yes    Last Appointment:  1/23/2025    Future appts:  Future Appointments   Date Time Provider Department Center   5/8/2025  2:15 PM Cherise Goss MD [facility] DEP        (If no appt send self scheduling link. .REFILLAPPT)  Scheduling request sent?     [] Yes  [x] No    Does patient need updated?  [] Yes  [x] No

## 2025-03-18 RX ORDER — VALSARTAN AND HYDROCHLOROTHIAZIDE 320; 12.5 MG/1; MG/1
1 TABLET, FILM COATED ORAL DAILY
Qty: 90 TABLET | Refills: 1 | Status: SHIPPED | OUTPATIENT
Start: 2025-03-18

## 2025-04-15 DIAGNOSIS — F41.1 GENERALIZED ANXIETY DISORDER: ICD-10-CM

## 2025-04-15 NOTE — TELEPHONE ENCOUNTER
Name of Medication(s) Requested:  Requested Prescriptions     Pending Prescriptions Disp Refills    busPIRone (BUSPAR) 10 MG tablet [Pharmacy Med Name: BUSPIRONE 10MG TABLETS] 180 tablet      Sig: TAKE 1 TABLET BY MOUTH TWICE DAILY AS NEEDED FOR ANXIETY       Medication is on current medication list Yes    Dosage and directions were verified? Yes    Quantity verified: 90 day supply     Pharmacy Verified?  Yes    Last Appointment:  1/23/2025    Future appts:  Future Appointments   Date Time Provider Department Center   5/8/2025  2:15 PM Cherise Goss MD [facility] DEP        (If no appt send self scheduling link. .REFILLAPPT)  Scheduling request sent?     [] Yes  [x] No    Does patient need updated?  [] Yes  [x] No

## 2025-04-16 RX ORDER — BUSPIRONE HYDROCHLORIDE 10 MG/1
10 TABLET ORAL 2 TIMES DAILY PRN
Qty: 180 TABLET | Refills: 1 | Status: SHIPPED | OUTPATIENT
Start: 2025-04-16

## 2025-04-22 ENCOUNTER — TELEPHONE (OUTPATIENT)
Dept: FAMILY MEDICINE CLINIC | Age: 85
End: 2025-04-22

## 2025-04-22 DIAGNOSIS — R29.818 DIFFICULTY BALANCING: Primary | ICD-10-CM

## 2025-04-22 NOTE — TELEPHONE ENCOUNTER
Pt son Ry called asking for referral for PT.  [facility].  Ry stated it is for balance and change of direction therapy. She would like to go to [facility] again.  Order can be faxed.     Last seen 1/23/2025  Next appt 5/8/2025    Fax [phone]

## 2025-05-08 ENCOUNTER — OFFICE VISIT (OUTPATIENT)
Dept: FAMILY MEDICINE CLINIC | Age: 85
End: 2025-05-08

## 2025-05-08 VITALS
OXYGEN SATURATION: 96 % | WEIGHT: 134 LBS | RESPIRATION RATE: 20 BRPM | BODY MASS INDEX: 22.88 KG/M2 | HEART RATE: 70 BPM | DIASTOLIC BLOOD PRESSURE: 74 MMHG | HEIGHT: 64 IN | SYSTOLIC BLOOD PRESSURE: 132 MMHG

## 2025-05-08 DIAGNOSIS — I10 PRIMARY HYPERTENSION: Primary | ICD-10-CM

## 2025-05-08 RX ORDER — VALSARTAN AND HYDROCHLOROTHIAZIDE 320; 12.5 MG/1; MG/1
1 TABLET, FILM COATED ORAL DAILY
Qty: 90 TABLET | Refills: 1
Start: 2025-05-08

## 2025-05-08 RX ORDER — METOPROLOL SUCCINATE 100 MG/1
100 TABLET, EXTENDED RELEASE ORAL DAILY
Qty: 90 TABLET | Refills: 1 | Status: SHIPPED | OUTPATIENT
Start: 2025-05-08

## 2025-05-08 RX ORDER — AMLODIPINE BESYLATE 10 MG/1
10 TABLET ORAL DAILY
Qty: 90 TABLET | Refills: 1
Start: 2025-05-08

## 2025-05-08 NOTE — PROGRESS NOTES
[address], [address]   [place] [address]   [phone]   Cherise Goss MD     Patient: Courtney Hdz  Dateof Birth: 1940  Visit Date: 5/8/25    Courtney is a 85 y.o. year old female here today for   Chief Complaint   Patient presents with    Hypertension       HPI  History of Present Illness  The patient presents for evaluation of hypertension, neuropathy, memory loss, and seizure disorder.    She reports experiencing a burning sensation in her ankle, which she attributes to neuropathy. Previously, she was on gabapentin, but it was discontinued due to dizziness. She has been prescribed naltrexone, but its efficacy remains uncertain as she has recently started the medication. She is not currently taking duloxetine or Lyrica. She has gained approximately 10 pounds, which she believes may be a side effect of her medications. She has been attending physical therapy at [facility] [place], which she finds beneficial. However, she experiences balance issues, particularly when crossing her legs or turning quickly, and has difficulty maintaining a straight posture. She also reports increased difficulty with stairs.    She is seeking a refill of her seizure medication and expresses concern about potential changes to her medication regimen, as she has been seizure-free for 14 years.    She is interested in exploring the use of Prevagen to enhance her memory, as she has been experiencing forgetfulness.    She is on amlodipine, metoprolol, and valsartan for her blood pressure. She does not report any chest pain or shortness of breath. She does not report any rash or skin changes. She does not report any nausea, vomiting, or diarrhea. She does not report any pain or burning with urination. She does report constipation and is unsure if it is from her medications.    She had blood work done in 12/2024.      Recent lab results reviewed, including CMP, CBC, TSH which are not  Cardiology Electrophysiology Cardiology Electrophysiology

## 2025-06-08 DIAGNOSIS — I10 PRIMARY HYPERTENSION: ICD-10-CM

## 2025-06-09 RX ORDER — VALSARTAN AND HYDROCHLOROTHIAZIDE 160; 12.5 MG/1; MG/1
1 TABLET, FILM COATED ORAL DAILY
Qty: 90 TABLET | Refills: 1 | OUTPATIENT
Start: 2025-06-09

## 2025-06-25 ENCOUNTER — TELEPHONE (OUTPATIENT)
Dept: FAMILY MEDICINE CLINIC | Age: 85
End: 2025-06-25

## 2025-06-25 NOTE — TELEPHONE ENCOUNTER
Pt called she is getting naltraxone 1.5 mg from Dr Bharat Velarde from [facility]. He is asking if Dr Goss will take over ordering it for the pt?  If anything is needed you can call Dr Parry office.    Last seen 5/8/2025  Next appt 8/11/2025    Dr. Velarde [phone]

## 2025-06-30 ENCOUNTER — TELEPHONE (OUTPATIENT)
Dept: FAMILY MEDICINE CLINIC | Age: 85
End: 2025-06-30

## 2025-06-30 NOTE — TELEPHONE ENCOUNTER
[facility] pharmacist called regarding RX for Naltrexone 1.5 mg capsules, informing me that this RX does not come in that strength.  She stated that it only comes in a 50 mg tablet.

## 2025-06-30 NOTE — TELEPHONE ENCOUNTER
I called patient and she informed me that she was getting Naltrexone HCL 1.5 mg at  [facility] in [place], [place]  P [phone].    Name of Medication(s) Requested:  Requested Prescriptions     Pending Prescriptions Disp Refills    Naltrexone HCl, Pain, 1.5 MG CAPS 30 capsule 3     Sig: Take 1.5 mg by mouth daily       Medication is on current medication list Yes    Dosage and directions were verified? Yes    Quantity verified: 30 day supply     Pharmacy Verified?  Yes    Last Appointment:  5/8/2025    Future appts:  Future Appointments   Date Time Provider Department Center   8/11/2025  4:45 PM Cherise Goss MD [facility] E[facility] DEP        (If no appt send self scheduling link. .REFILLAPPT)  Scheduling request sent?     [] Yes  [x] No    Does patient need updated?  [] Yes  [x] No

## 2025-06-30 NOTE — TELEPHONE ENCOUNTER
Patient was prescribed this low dose from pain management. Please find out which pharmacy she was getting it from.

## 2025-08-11 ENCOUNTER — OFFICE VISIT (OUTPATIENT)
Dept: FAMILY MEDICINE CLINIC | Age: 85
End: 2025-08-11

## 2025-08-11 ENCOUNTER — TELEPHONE (OUTPATIENT)
Dept: FAMILY MEDICINE CLINIC | Age: 85
End: 2025-08-11

## 2025-08-11 VITALS
WEIGHT: 131 LBS | OXYGEN SATURATION: 95 % | SYSTOLIC BLOOD PRESSURE: 138 MMHG | RESPIRATION RATE: 20 BRPM | HEART RATE: 71 BPM | DIASTOLIC BLOOD PRESSURE: 78 MMHG | BODY MASS INDEX: 22.36 KG/M2 | HEIGHT: 64 IN

## 2025-08-11 DIAGNOSIS — I10 PRIMARY HYPERTENSION: ICD-10-CM

## 2025-08-11 RX ORDER — AMLODIPINE BESYLATE 10 MG/1
10 TABLET ORAL DAILY
Qty: 90 TABLET | Refills: 1 | Status: SHIPPED | OUTPATIENT
Start: 2025-08-11

## 2025-08-11 RX ORDER — ATORVASTATIN CALCIUM 40 MG/1
40 TABLET, FILM COATED ORAL NIGHTLY
Qty: 90 TABLET | Refills: 1 | Status: SHIPPED | OUTPATIENT
Start: 2025-08-11

## (undated) DEVICE — NEEDLE HYPO 18GA L1.5IN PNK POLYPR HUB S STL THN WALL FILL

## (undated) DEVICE — 6 ML SYRINGE LUER-LOCK TIP: Brand: MONOJECT

## (undated) DEVICE — GAUZE,SPONGE,4"X4",12PLY,STERILE,LF,2'S: Brand: MEDLINE

## (undated) DEVICE — 3M™ RED DOT™ MONITORING ELECTRODE WITH FOAM TAPE AND STICKY GEL 2560, 50/BAG, 20/CASE, 72/PLT: Brand: RED DOT™

## (undated) DEVICE — NEEDLE HYPO 25GA L1.5IN BLU POLYPR HUB S STL REG BVL STR

## (undated) DEVICE — 12 ML SYRINGE,LUER-LOCK TIP: Brand: MONOJECT

## (undated) DEVICE — GLOVE ORANGE PI 7 1/2   MSG9075

## (undated) DEVICE — 3 ML SYRINGE LUER-LOCK TIP: Brand: MONOJECT

## (undated) DEVICE — BANDAGE ADH W1XL3IN NAT FAB WVN FLX DURABLE N ADH PD SEAL

## (undated) DEVICE — TOWEL,OR,DSP,ST,BLUE,STD,6/PK,12PK/CS: Brand: MEDLINE

## (undated) DEVICE — Z DISCONTINUED APPLICATOR SURG PREP 0.35OZ 2% CHG 70% ISO ALC W/ HI LT